# Patient Record
Sex: FEMALE | Race: BLACK OR AFRICAN AMERICAN | NOT HISPANIC OR LATINO | Employment: FULL TIME | URBAN - METROPOLITAN AREA
[De-identification: names, ages, dates, MRNs, and addresses within clinical notes are randomized per-mention and may not be internally consistent; named-entity substitution may affect disease eponyms.]

---

## 2017-02-08 ENCOUNTER — OFFICE VISIT (OUTPATIENT)
Dept: FAMILY MEDICINE CLINIC | Facility: CLINIC | Age: 57
End: 2017-02-08

## 2017-02-08 VITALS
DIASTOLIC BLOOD PRESSURE: 70 MMHG | WEIGHT: 230 LBS | BODY MASS INDEX: 38.32 KG/M2 | TEMPERATURE: 97.5 F | SYSTOLIC BLOOD PRESSURE: 132 MMHG | HEIGHT: 65 IN
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
## 2017-02-08 DIAGNOSIS — E88.81 METABOLIC SYNDROME: ICD-10-CM

## 2017-02-08 DIAGNOSIS — R20.0 NUMBNESS OF RIGHT HAND: ICD-10-CM

## 2017-02-08 DIAGNOSIS — L65.9 PATCHY LOSS OF HAIR: ICD-10-CM

## 2017-02-08 DIAGNOSIS — Z13.1 SCREENING FOR DIABETES MELLITUS: ICD-10-CM

## 2017-02-08 DIAGNOSIS — Z79.899 ENCOUNTER FOR LONG-TERM (CURRENT) USE OF MEDICATIONS: Primary | ICD-10-CM

## 2017-02-08 DIAGNOSIS — S49.91XA RIGHT SHOULDER INJURY, INITIAL ENCOUNTER: ICD-10-CM

## 2017-02-08 PROCEDURE — 99214 OFFICE O/P EST MOD 30 MIN: CPT | Performed by: INTERNAL MEDICINE

## 2017-02-08 RX ORDER — MULTIVIT-MINS NO.7/FOLIC ACID 1 MG
CAPSULE ORAL
Refills: 5 | COMMUNITY
Start: 2017-01-24 | End: 2020-04-15

## 2017-02-08 NOTE — PATIENT INSTRUCTIONS
Diagnoses and all orders for this visit:    Fall (on) (from) other stairs and steps, initial encounter  Comments:  Issues seem limited to right arm and hand  Refer to Ortho  Tylenool for now    Numbness of right hand  Comments:  Refer to ortho    Right shoulder injury, initial encounter  Comments:  As above  Tylenol   Refer to Ortho    Patchy loss of hair  Comments:  Dr. Dinh to evaluate  Injecting scalp    Metabolic syndrome with insulin resistance  Comments:  Recheck labs today  Has been taking her meds more regularly    Other orders  -     Multiple Vitamins-Minerals (V-C FORTE) capsule; TK 1 C PO QD

## 2017-02-08 NOTE — PROGRESS NOTES
Lala Dejesus is a 56 y.o. female   Chief Complaint   Patient presents with   • Arm Pain     pain radiates from right shoulder to right hand, fell and caught her self on 1/12/17           Subjective   History of Present Illness     Lala Dejesus is a 56 y.o.female who presents with:pain in left arm and shoulder getting worse since  a fall on 1/13/17.  Tingling in palm of hand and right index finger and middle finger.  Ulnar nerve distribution of the nerve of the numbness.  NSAIA upset her stomach and she avoids.  Tried Tylenol and it helped.  Both numbness and pain on movement and particularly bad on certain days.  Certain movements like tweezing her chin cause her to have even more severe pain at times.  Sometimes just typing and sitting around she gets numbness that is particularly bad in the index finger and middle finger.  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    Other issues discussed today:  She does have a patchy loss of hair.She has seen Dr. Alvin Dinh for this again and he is doing injections into her scalp and using a medicine called VC [place] up with the situation.  In terms of her metabolic syndrome we talked about exercise diet weight loss and continuing her on Herzig do 05 500 that she takes once a day.  The key is obviously the weight loss and hopefully visit do oh will help with that.  Plan to check labs today to see how much we've accomplished by taking the Zetia do oh.  She has been taken out on a more regular basis recently.  It does sound like she has some stress at home but she is managing that quite effectively and working through things with her boyfriend.      The following portions of the patient's history were reviewed and updated as appropriate: past medical history, surgeries, family history, allergies, current medications, past social history and problem list.    A comprehensive review of 14 systems was peformed  Review of Systems  "  Constitutional: Negative for chills, fatigue, fever and unexpected weight change.   HENT: Negative for ear pain, hearing loss, sinus pressure, sore throat and tinnitus.    Eyes: Negative for pain, discharge and redness.   Respiratory: Negative for cough, shortness of breath and wheezing.    Cardiovascular: Negative for chest pain, palpitations and leg swelling.   Gastrointestinal: Negative for abdominal pain, constipation, diarrhea and nausea.   Endocrine: Negative for cold intolerance and heat intolerance.   Genitourinary: Negative for difficulty urinating, flank pain and urgency.   Musculoskeletal: Negative for back pain, joint swelling and myalgias.   Skin: Negative for rash and wound.   Allergic/Immunologic: Negative for environmental allergies and food allergies.   Neurological: Negative for dizziness, seizures, numbness and headaches.   Hematological: Negative for adenopathy. Does not bruise/bleed easily.   Psychiatric/Behavioral: Negative for decreased concentration, dysphoric mood and sleep disturbance. The patient is not nervous/anxious.    All other systems reviewed and are negative.      I have reviewed the patient's medical history in detail and updated the computerized patient record.      Objective   Vitals:    02/08/17 1117   BP: 132/70   BP Location: Left arm   Patient Position: Sitting   Temp: 97.5 °F (36.4 °C)   TempSrc: Oral   Weight: 230 lb (104 kg)   Height: 64.5\" (163.8 cm)           Physical Exam   Constitutional: She appears well-developed and well-nourished.   HENT:   Head: Normocephalic and atraumatic.   Right Ear: External ear normal.   Left Ear: External ear normal.   Nose: Nose normal.   Mouth/Throat: Oropharynx is clear and moist.   Eyes: Conjunctivae and EOM are normal. Pupils are equal, round, and reactive to light.   Neck: Normal range of motion. Neck supple.   Cardiovascular: Normal rate, regular rhythm, normal heart sounds and intact distal pulses.    Pulmonary/Chest: Effort " normal and breath sounds normal.   Abdominal: Soft. Bowel sounds are normal.   Musculoskeletal: Normal range of motion.   Neurological: She is alert. She has normal reflexes.   Skin: Skin is warm and dry.   Psychiatric: She has a normal mood and affect. Her behavior is normal. Judgment and thought content normal.   Nursing note and vitals reviewed.      Procedures    Reviewed consult notes from Dr. Dinh    Reviewed old notes from [facility]                          Assessment/Plan     Diagnoses and all orders for this visit:    Fall (on) (from) other stairs and steps, initial encounter  Comments:  Issues seem limited to right arm and hand  Refer to Ortho  Tylenool for now    Numbness of right hand  Comments:  Refer to ortho    Right shoulder injury, initial encounter  Comments:  As above  Tylenol   Refer to Ortho    Patchy loss of hair  Comments:  Dr. Dinh to evaluate  Injecting scalp    Metabolic syndrome with insulin resistance  Comments:  Recheck labs today  Has been taking her meds more regularly    Other orders  -     Multiple Vitamins-Minerals (V-C FORTE) capsule; TK 1 C PO QD         Martin Vilchis MD  2/8/2017  11:22 AM

## 2017-02-09 LAB
ALBUMIN SERPL-MCNC: 4.1 G/DL (ref 3.5–5.2)
ALBUMIN/GLOB SERPL: 1.1 G/DL
ALP SERPL-CCNC: 111 U/L (ref 39–117)
ALT SERPL-CCNC: 16 U/L (ref 1–33)
AST SERPL-CCNC: 13 U/L (ref 1–32)
BILIRUB SERPL-MCNC: 0.2 MG/DL (ref 0.1–1.2)
BUN SERPL-MCNC: 9 MG/DL (ref 6–20)
BUN/CREAT SERPL: 11.1 (ref 7–25)
CALCIUM SERPL-MCNC: 10 MG/DL (ref 8.6–10.5)
CHLORIDE SERPL-SCNC: 102 MMOL/L (ref 98–107)
CO2 SERPL-SCNC: 28.9 MMOL/L (ref 22–29)
CREAT SERPL-MCNC: 0.81 MG/DL (ref 0.57–1)
GLOBULIN SER CALC-MCNC: 3.9 GM/DL
GLUCOSE SERPL-MCNC: 89 MG/DL (ref 65–99)
HBA1C MFR BLD: 6.56 % (ref 4.8–5.6)
POTASSIUM SERPL-SCNC: 4.5 MMOL/L (ref 3.5–5.2)
PROT SERPL-MCNC: 8 G/DL (ref 6–8.5)
SODIUM SERPL-SCNC: 143 MMOL/L (ref 136–145)

## 2017-02-10 DIAGNOSIS — M25.511 ACUTE PAIN OF RIGHT SHOULDER: Primary | ICD-10-CM

## 2017-03-06 ENCOUNTER — OFFICE VISIT (OUTPATIENT)
Dept: ORTHOPEDIC SURGERY | Facility: CLINIC | Age: 57
End: 2017-03-06

## 2017-03-06 VITALS — WEIGHT: 230 LBS | BODY MASS INDEX: 39.27 KG/M2 | TEMPERATURE: 97.8 F | HEIGHT: 64 IN

## 2017-03-06 DIAGNOSIS — M25.511 CHRONIC RIGHT SHOULDER PAIN: Primary | ICD-10-CM

## 2017-03-06 DIAGNOSIS — M25.521 RIGHT ELBOW PAIN: ICD-10-CM

## 2017-03-06 DIAGNOSIS — G56.01 RIGHT CARPAL TUNNEL SYNDROME: ICD-10-CM

## 2017-03-06 DIAGNOSIS — G89.29 CHRONIC RIGHT SHOULDER PAIN: Primary | ICD-10-CM

## 2017-03-06 PROCEDURE — 20610 DRAIN/INJ JOINT/BURSA W/O US: CPT | Performed by: ORTHOPAEDIC SURGERY

## 2017-03-06 PROCEDURE — 73010 X-RAY EXAM OF SHOULDER BLADE: CPT | Performed by: ORTHOPAEDIC SURGERY

## 2017-03-06 PROCEDURE — 73030 X-RAY EXAM OF SHOULDER: CPT | Performed by: ORTHOPAEDIC SURGERY

## 2017-03-06 PROCEDURE — 99204 OFFICE O/P NEW MOD 45 MIN: CPT | Performed by: ORTHOPAEDIC SURGERY

## 2017-03-06 PROCEDURE — 73070 X-RAY EXAM OF ELBOW: CPT | Performed by: ORTHOPAEDIC SURGERY

## 2017-03-06 PROCEDURE — 20526 THER INJECTION CARP TUNNEL: CPT | Performed by: ORTHOPAEDIC SURGERY

## 2017-03-06 RX ADMIN — LIDOCAINE HYDROCHLORIDE 2 ML: 10 INJECTION, SOLUTION INFILTRATION; PERINEURAL at 07:30

## 2017-03-06 RX ADMIN — BUPIVACAINE HYDROCHLORIDE 2 ML: 5 INJECTION, SOLUTION PERINEURAL at 07:30

## 2017-03-06 RX ADMIN — METHYLPREDNISOLONE ACETATE 160 MG: 80 INJECTION, SUSPENSION INTRA-ARTICULAR; INTRALESIONAL; INTRAMUSCULAR; SOFT TISSUE at 07:30

## 2017-03-07 RX ORDER — BUPIVACAINE HYDROCHLORIDE 5 MG/ML
1 INJECTION, SOLUTION PERINEURAL
Status: COMPLETED | OUTPATIENT
Start: 2017-03-07 | End: 2017-03-07

## 2017-03-07 RX ORDER — LIDOCAINE HYDROCHLORIDE 10 MG/ML
2 INJECTION, SOLUTION INFILTRATION; PERINEURAL
Status: COMPLETED | OUTPATIENT
Start: 2017-03-06 | End: 2017-03-06

## 2017-03-07 RX ORDER — LIDOCAINE HYDROCHLORIDE 10 MG/ML
1 INJECTION, SOLUTION INFILTRATION; PERINEURAL
Status: COMPLETED | OUTPATIENT
Start: 2017-03-07 | End: 2017-03-07

## 2017-03-07 RX ORDER — BUPIVACAINE HYDROCHLORIDE 5 MG/ML
2 INJECTION, SOLUTION PERINEURAL
Status: COMPLETED | OUTPATIENT
Start: 2017-03-06 | End: 2017-03-06

## 2017-03-07 RX ORDER — METHYLPREDNISOLONE ACETATE 80 MG/ML
80 INJECTION, SUSPENSION INTRA-ARTICULAR; INTRALESIONAL; INTRAMUSCULAR; SOFT TISSUE
Status: COMPLETED | OUTPATIENT
Start: 2017-03-07 | End: 2017-03-07

## 2017-03-07 RX ORDER — METHYLPREDNISOLONE ACETATE 80 MG/ML
160 INJECTION, SUSPENSION INTRA-ARTICULAR; INTRALESIONAL; INTRAMUSCULAR; SOFT TISSUE
Status: COMPLETED | OUTPATIENT
Start: 2017-03-06 | End: 2017-03-06

## 2017-03-07 RX ADMIN — BUPIVACAINE HYDROCHLORIDE 1 ML: 5 INJECTION, SOLUTION PERINEURAL at 07:31

## 2017-03-07 RX ADMIN — LIDOCAINE HYDROCHLORIDE 1 ML: 10 INJECTION, SOLUTION INFILTRATION; PERINEURAL at 07:31

## 2017-03-07 RX ADMIN — METHYLPREDNISOLONE ACETATE 80 MG: 80 INJECTION, SUSPENSION INTRA-ARTICULAR; INTRALESIONAL; INTRAMUSCULAR; SOFT TISSUE at 07:31

## 2017-03-07 NOTE — PROGRESS NOTES
"  Patient: Lala Dejesus    YOB: 1960    Medical Record Number: [id number]    Chief Complaints:  Right shoulder pain, right elbow pain, right hand numbness and tingling    History of Present Illness:     56 y.o. female patient who presents for evaluation of a number of issues.  The first is her shoulder.  She reports an approximately 6 week history of pain along the side of the shoulder.  She first noticed this sometime around the end of January.  She cannot recall any specific inciting event or factor, although she recalls that she slipped and fell in mid January, catching herself with her arm.  She doesn't recall specifically injuring the shoulder though..  The pain seems to have gradually progressed.  She describes moderate intermittent pain which is worse with certain reaching and lifting movements.   She has not noticed any alleviating factors.    She also mentions that she has been experiencing intermittent pain radiating down into her hand as well.  She tells me that it seems to start somewhere in her elbow or forearm and then shoots down into her thumb and index finger.  She also experiences intermittent numbness and tingling.  She specifically mentions that when she is \"tweezing my chin hairs\" her hand tends to go numb.  She also gets some intermittent symptoms at night.  She describes aching burning pain in the hand on occasion.  Denies any constant numbness or weakness She has not noticed any alleviating factors.    Allergies:   Allergies   Allergen Reactions   • Ketorolac Anaphylaxis   • Morphine And Related Anaphylaxis   • Aspirin    • Metformin And Related Other (See Comments)     Reaction: severe stomach cramp       Home Medications:      Current Outpatient Prescriptions:   •  Biotin (BIOTIN MAXIMUM STRENGTH) 10 MG tablet, Take  by mouth Daily., Disp: , Rfl:   •  Calcium Carb-Cholecalciferol (CALCIUM 600 + D) 600-200 MG-UNIT tablet, Take  by mouth., Disp: , Rfl:   •  " Dapagliflozin-Metformin HCl ER (XIGDUO XR) 5-500 MG tablet sustained-release 24 hour, Take 1 tablet by mouth Daily With Breakfast., Disp: 30 tablet, Rfl: 11  •  Eflornithine HCl 13.9 % cream, Use small amount once daily on chin for hair growth, Disp: 30 g, Rfl: 5  •  loratadine-pseudoephedrine (CLARITIN-D 12 HOUR) 5-120 MG per 12 hr tablet, Take  by mouth., Disp: , Rfl:   •  Multiple Vitamin (MULTI VITAMIN DAILY PO), Take  by mouth., Disp: , Rfl:   •  Multiple Vitamins-Minerals (V-C FORTE) capsule, TK 1 C PO QD, Disp: , Rfl: 5  •  Omega 3 1000 MG capsule, Take  by mouth., Disp: , Rfl:     Current Facility-Administered Medications:   •  cyanocobalamin injection 1,000 mcg, 1,000 mcg, Intramuscular, Q28 Days, Martin Vilchis MD, 1,000 mcg at 05/09/16 1647    Past Medical History   Diagnosis Date   • Abdominal pain      Abdominal pain and intolerance to regular metformin   • Abnormal Pap smear of cervix      Abnormal PAP S/P LEEP   • Allergic reaction      to morphine   • Allergic rhinitis    • Amenorrhea      due to menopause   • Breast lump      benign in past   • DM (diabetes mellitus)    • FH: early death      Early sudden death in family in family at age 34   • Gastritis    • Gastroparesis      with recurrent nausea   • GERD (gastroesophageal reflux disease)    • H/O mammogram      03/31/2016 wnl  12/27/13 12/04/12   • Hair loss      due to nervous twisting   • Hiatal hernia      on scope 2004   • History of bone density study 10/01/2012   • History of colonoscopy 03/2015     WNL   • History of EKG 12/06/2012   • Hyperlipidemia    • Insomnia    • Insulin resistance      with elevated glucoses trial of Janumet XR 50/1000   • Low back pain    • Metabolic syndrome      with high insulin level   • Papanicolaou smear 01/2016     wnl   • Right knee DJD    • Stricture of esophagus      Strictures of esophagus   • Trauma      to left hip, left hand and left elbow at hotel 11/18/15- no sequaela   • Weight loss       "Intentional weight loss at [facility]       Past Surgical History   Procedure Laterality Date   • Lasik Right 09/01/2012   • Eye surgery     • Leep N/A 1994     SURG   LEEP Procedure   • Nose surgery     • Colonoscopy  04/25/2014       Social History     Occupational History   • [employer]      Social History Main Topics   • Smoking status: Never Smoker   • Smokeless tobacco: Not on file   • Alcohol use Yes      Comment: rare   • Drug use: No   • Sexual activity: Yes     Partners: Male      Social History     Social History Narrative    Diet low carb.  [employer] home meal plan.        Hobby= shopping, walking, travel        Exercise= Water aeorbic, Gayatri, walking               Family History   Problem Relation Age of Onset   • Hypertension Mother    • Hypertension Sister    • Obesity Sister    • Heart disease Father    • Stroke Father    • Heart disease Other    • Sudden death Other        Review of Systems:      Constitutional: Denies fever, shaking or chills   Eyes: Denies change in visual acuity   HEENT: Denies nasal congestion or sore throat   Respiratory: Denies cough or shortness of breath   Cardiovascular: Denies chest pain or edema  Endocrine: Denies tremors, palpitations, intolerance of heat or cold, polyuria, polydipsia.  GI: Denies abdominal pain, nausea, vomiting, bloody stools or diarrhea  : Denies frequency, urgency, incontinence, retention, or nocturia.  Musculoskeletal: Denies numbness tingling or loss of motor function except as above  Integument: Denies rash, lesion or ulceration   Neurologic: Denies headache or focal weakness, deficits  Heme: Denies epistaxis, spontaneous or excessive bleeding, epistaxis, hematuria, melena, fatigue, enlarged or tender lymph nodes.      All other pertinent positives and negatives as noted above in HPI.      Physical Exam: 56 y.o. female    Vitals:    03/06/17 0908   Temp: 97.8 °F (36.6 °C)   Weight: 230 lb (104 kg)   Height: 64\" (162.6 cm) "       General:  Patient is awake and alert.  Appears in no acute distress or discomfort.    Psych:  Affect and demeanor are appropriate.    Eyes:  Conjunctiva and sclera appear grossly normal.  Eyes track well and EOM seem to be intact.    Ears:  No gross abnormalities.  Hearing adequate for the exam.    Cardiovascular:  Regular rate and rhythm.    Lungs:  Good chest expansion.  Breathing unlabored.    Lymph:  No palpable masses or adenopathy in the affected extremity    Extremities:  The right upper extremity is examined.  Skin is benign.  No gross abnormalities on inspection including any atrophy, swellings, or masses.  No palpable masses or adenopathy. No focal tenderness.  Full shoulder motion.  No evident instability or apprehension.  Positive Neer and Woodard manuevers.  Negative active compression maneuver.  Negative Speeds and Yergasons manuevers.  Good strength in the rotator cuff, deltoid, biceps, triceps, and .  Intact sensation throughout the arm and hand.  Positive Tinel's and Phalen's over the carpal tunnel.  Brisk cap refill.         Radiology:   AP, scapular Y, and axillary views of the right shoulder are ordered by myself and reviewed to evaluate the patient's complaint.  No comparison films are immediately available.  The x-rays show no obvious acute abnormalities, lesions, masses, or significant glenohumeral degenerative changes.  She does have moderate to severe acromioclavicular osteoarthritis.  The acromiohumeral interval is normal.  Glenoid version appears normal as well.  AP and lateral views of the right elbow are also ordered and reviewed.  No comparison films are available.  No concerning findings noted on the elbow x-rays      Assessment/Plan:  1.  Right shoulder impingement syndrome  2.  Right carpal tunnel syndrome    We talked about options for her.  I suggested that we try injections for both the shoulder and carpal tunnel.  The risk, benefits, and alternatives were thoroughly  discussed, particularly in light of her diabetes.  She did consent to proceed as described below.  I also recommended that we have her start using a splint for her wrist at night.  I had her fitted for that today.  I offered her a referral to formal physical therapy.  She declined that.  Going forward, I will release her to follow-up as needed.  If her symptoms persist and/or recur, I told her that I will be happy to see her back at any point.    Large Joint Arthrocentesis  Date/Time: 3/6/2017 7:30 AM  Consent given by: patient  Site marked: site marked  Timeout: Immediately prior to procedure a time out was called to verify the correct patient, procedure, equipment, support staff and site/side marked as required   Supporting Documentation  Indications: pain and joint swelling   Procedure Details  Location: shoulder - R subacromial bursa  Preparation: Patient was prepped and draped in the usual sterile fashion  Needle size: 25 G  Approach: posterior  Medications administered: 2 mL lidocaine 1 %; 160 mg methylPREDNISolone acetate 80 MG/ML; 2 mL bupivacaine  Patient tolerance: patient tolerated the procedure well with no immediate complications    Small Joint Arthrocentesis  Consent given by: patient  Site marked: site marked  Timeout: Immediately prior to procedure a time out was called to verify the correct patient, procedure, equipment, support staff and site/side marked as required   Supporting Documentation  Indications: pain and diagnostic evaluation   Procedure Details  Location: right carpal tunnel.  Preparation: Patient was prepped and draped in the usual sterile fashion  Needle size: 25 G  Approach: volar  Medications administered: 1 mL lidocaine 1 %; 80 mg methylPREDNISolone acetate 80 MG/ML; 1 mL bupivacaine  Patient tolerance: patient tolerated the procedure well with no immediate complications          Basilio Santiago MD    03/06/2017    CC to Martin Vilchis MD

## 2017-03-09 ENCOUNTER — OFFICE VISIT (OUTPATIENT)
Dept: FAMILY MEDICINE CLINIC | Facility: CLINIC | Age: 57
End: 2017-03-09

## 2017-03-09 VITALS
OXYGEN SATURATION: 97 % | WEIGHT: 232.6 LBS | BODY MASS INDEX: 39.71 KG/M2 | TEMPERATURE: 98.1 F | HEIGHT: 64 IN | DIASTOLIC BLOOD PRESSURE: 76 MMHG | SYSTOLIC BLOOD PRESSURE: 128 MMHG | HEART RATE: 83 BPM

## 2017-03-09 DIAGNOSIS — K31.84 GASTROPARESIS: ICD-10-CM

## 2017-03-09 DIAGNOSIS — N95.1 MENOPAUSAL SYNDROME: ICD-10-CM

## 2017-03-09 DIAGNOSIS — E53.8 VITAMIN B12 DEFICIENCY: ICD-10-CM

## 2017-03-09 DIAGNOSIS — E88.81 METABOLIC SYNDROME: Primary | ICD-10-CM

## 2017-03-09 DIAGNOSIS — R31.9 HEMATURIA: ICD-10-CM

## 2017-03-09 LAB
BILIRUB BLD-MCNC: NEGATIVE MG/DL
CLARITY, POC: CLEAR
COLOR UR: YELLOW
GLUCOSE UR STRIP-MCNC: NEGATIVE MG/DL
KETONES UR QL: NEGATIVE
LEUKOCYTE EST, POC: NEGATIVE
NITRITE UR-MCNC: NEGATIVE MG/ML
PH UR: 6 [PH] (ref 5–8)
PROT UR STRIP-MCNC: ABNORMAL MG/DL
RBC # UR STRIP: ABNORMAL /UL
SP GR UR: 1.02 (ref 1–1.03)
UROBILINOGEN UR QL: NORMAL

## 2017-03-09 PROCEDURE — 81003 URINALYSIS AUTO W/O SCOPE: CPT | Performed by: INTERNAL MEDICINE

## 2017-03-09 PROCEDURE — 99213 OFFICE O/P EST LOW 20 MIN: CPT | Performed by: INTERNAL MEDICINE

## 2017-03-09 NOTE — PATIENT INSTRUCTIONS
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

## 2017-03-09 NOTE — PROGRESS NOTES
Lala Dejesus is a 56 y.o. female   Chief Complaint   Patient presents with   • organization         Subjective   History of Present Illness     Lala Dejesus is a 56 y.o.female who presents with:routine follow up.  No other acute changes at present.Urine today shows a trace of hematuria.  Blood sugar doing okay.  Refilling meds at some point.  Doing well at present time.      The following portions of the patient's history were reviewed and updated as appropriate: past medical history, surgeries, family history, allergies, current medications, past social history and problem list.    A comprehensive review of 14 systems was peformed  Review of Systems   Constitutional: Negative for chills, fatigue, fever and unexpected weight change.   HENT: Negative for ear pain, hearing loss, sinus pressure, sore throat and tinnitus.    Eyes: Negative for pain, discharge and redness.   Respiratory: Negative for cough, shortness of breath and wheezing.    Cardiovascular: Negative for chest pain, palpitations and leg swelling.   Gastrointestinal: Negative for abdominal pain, constipation, diarrhea and nausea.   Endocrine: Negative for cold intolerance and heat intolerance.   Genitourinary: Negative for difficulty urinating, flank pain and urgency.   Musculoskeletal: Negative for back pain, joint swelling and myalgias.   Skin: Negative for rash and wound.   Allergic/Immunologic: Negative for environmental allergies and food allergies.   Neurological: Negative for dizziness, seizures, numbness and headaches.   Hematological: Negative for adenopathy. Does not bruise/bleed easily.   Psychiatric/Behavioral: Negative for decreased concentration, dysphoric mood and sleep disturbance. The patient is not nervous/anxious.    All other systems reviewed and are negative.      I have reviewed the patient's medical history in detail and updated the computerized patient record.    Objective   Vitals:    03/09/17 1600   BP: 128/76   BP Location:  "Right arm   Patient Position: Sitting   Cuff Size: Adult   Pulse: 83   Temp: 98.1 °F (36.7 °C)   TempSrc: Oral   SpO2: 97%   Weight: 232 lb 9.6 oz (106 kg)   Height: 64\" (162.6 cm)   PainSc: 0-No pain           Physical Exam   Constitutional: She appears well-developed and well-nourished.   HENT:   Head: Normocephalic and atraumatic.   Right Ear: External ear normal.   Left Ear: External ear normal.   Nose: Nose normal.   Mouth/Throat: Oropharynx is clear and moist.   Eyes: Conjunctivae and EOM are normal. Pupils are equal, round, and reactive to light.   Neck: Normal range of motion. Neck supple.   Cardiovascular: Normal rate, regular rhythm, normal heart sounds and intact distal pulses.    Pulmonary/Chest: Effort normal and breath sounds normal.   Abdominal: Soft. Bowel sounds are normal.   Musculoskeletal: Normal range of motion.   Neurological: She is alert. She has normal reflexes.   Skin: Skin is warm and dry.   Psychiatric: She has a normal mood and affect. Her behavior is normal. Judgment and thought content normal.       Procedures                        Assessment/Plan     Diagnoses and all orders for this visit:    Metabolic syndrome with insulin resistance  Comments:  Ongiing treatment at present time  Doing well    Vitamin B12 deficiency  Comments:  Continue to monitor  B-12 shot    Gastroparesis with metabolic syndrome  Comments:  Going well at present  No other acute changes    Hematuria  Comments:  Recheck next visit    Menopausal syndrome  Comments:  stable         Martin Vilchis MD  3/9/2017  4:02 PM      "

## 2017-05-30 DIAGNOSIS — E88.81 METABOLIC SYNDROME: ICD-10-CM

## 2017-06-08 ENCOUNTER — OFFICE VISIT (OUTPATIENT)
Dept: FAMILY MEDICINE CLINIC | Facility: CLINIC | Age: 57
End: 2017-06-08

## 2017-06-08 VITALS
DIASTOLIC BLOOD PRESSURE: 78 MMHG | OXYGEN SATURATION: 99 % | SYSTOLIC BLOOD PRESSURE: 122 MMHG | BODY MASS INDEX: 39.13 KG/M2 | TEMPERATURE: 98.7 F | HEIGHT: 64 IN | HEART RATE: 86 BPM | WEIGHT: 229.2 LBS

## 2017-06-08 DIAGNOSIS — K22.2 STRICTURE OF ESOPHAGUS: ICD-10-CM

## 2017-06-08 DIAGNOSIS — Z79.899 ENCOUNTER FOR LONG-TERM (CURRENT) USE OF MEDICATIONS: ICD-10-CM

## 2017-06-08 DIAGNOSIS — J30.1 NON-SEASONAL ALLERGIC RHINITIS DUE TO POLLEN: ICD-10-CM

## 2017-06-08 DIAGNOSIS — E78.5 HYPERLIPIDEMIA, UNSPECIFIED HYPERLIPIDEMIA TYPE: ICD-10-CM

## 2017-06-08 DIAGNOSIS — K31.84 GASTROPARESIS: Primary | ICD-10-CM

## 2017-06-08 DIAGNOSIS — F51.03 PARADOXICAL INSOMNIA: ICD-10-CM

## 2017-06-08 DIAGNOSIS — R31.9 HEMATURIA: ICD-10-CM

## 2017-06-08 PROCEDURE — 99214 OFFICE O/P EST MOD 30 MIN: CPT | Performed by: INTERNAL MEDICINE

## 2017-06-08 NOTE — PATIENT INSTRUCTIONS
Diagnoses and all orders for this visit:    Gastroparesis with metabolic syndrome  Comments:  Continue to monitor  No other acute changes at present time  Orders:  -     CBC & Differential  -     Comprehensive Metabolic Panel  -     Lipid Panel With LDL / HDL Ratio  -     Thyroid Panel With TSH  -     Hemoglobin A1c    Paradoxical insomnia  Comments:  Continue to monitor and treat as needeed  Follow up as planned  Orders:  -     CBC & Differential  -     Comprehensive Metabolic Panel  -     Lipid Panel With LDL / HDL Ratio  -     Thyroid Panel With TSH  -     Hemoglobin A1c    Hyperlipidemia, unspecified hyperlipidemia type  Comments:  Check labs  Consider statin at present time  Orders:  -     CBC & Differential  -     Comprehensive Metabolic Panel  -     Lipid Panel With LDL / HDL Ratio  -     Thyroid Panel With TSH  -     Hemoglobin A1c    Stricture of esophagus  Comments:  Follow up as planned if symptoms reoccur    Non-seasonal allergic rhinitis due to pollen  Comments:  Follow up as needed    Encounter for long-term (current) use of medications  -     CBC & Differential  -     Comprehensive Metabolic Panel  -     Lipid Panel With LDL / HDL Ratio  -     Thyroid Panel With TSH  -     Hemoglobin A1c    Hematuria

## 2017-06-08 NOTE — PROGRESS NOTES
Lala Dejesus is a 56 y.o. female   Chief Complaint   Patient presents with   • metabolic syndrome     4 mo f/u       Diabetes   She presents for her follow-up diabetic visit. She has type 2 diabetes mellitus. No MedicAlert identification noted. The initial diagnosis of diabetes was made 1 year ago. Her disease course has been fluctuating. Pertinent negatives for hypoglycemia include no confusion, dizziness, headaches, hunger, mood changes, nervousness/anxiousness, pallor, seizures, sleepiness, speech difficulty, sweats or tremors. Pertinent negatives for diabetes include no blurred vision, no chest pain, no fatigue, no foot paresthesias, no foot ulcerations, no polydipsia, no polyphagia, no polyuria, no visual change, no weakness and no weight loss. Pertinent negatives for hypoglycemia complications include no blackouts, no hospitalization, no nocturnal hypoglycemia, no required assistance and no required glucagon injection. Symptoms are stable. Pertinent negatives for diabetic complications include no autonomic neuropathy, CVA, heart disease, impotence, nephropathy, peripheral neuropathy, PVD or retinopathy. Risk factors for coronary artery disease include family history and sedentary lifestyle. Current diabetic treatment includes diet and oral agent (dual therapy). She is compliant with treatment most of the time. Her weight is stable. She is following a diabetic diet. When asked about meal planning, she reported none. She has not had a previous visit with a dietitian. She participates in exercise three times a week. There is no change in her home blood glucose trend. An ACE inhibitor/angiotensin II receptor blocker is not being taken. She does not see a podiatrist.Eye exam is current.        Lala Dejesus is a 56 y.o.female who presents with metabolic syndrome.  Now follow up as planned.  Follow up as planned.  No other acute changes at present time.  Generally doing quite well. Feels good at present time.   "    No other acute changes at present time.  Doing well at present.  Weight seems to really effect her A-1-C at this point.  Thinks that her weight really controls the A-1-C.  Otherwise doing well.  No other changes at present time.      The following portions of the patient's history were reviewed and updated as appropriate: past medical history, surgeries, family history, allergies, current medications, past social history and problem list.    A comprehensive review of 14 systems was peformed  Review of Systems   Constitutional: Negative.  Negative for fatigue and weight loss.   HENT: Negative.    Eyes: Negative.  Negative for blurred vision.   Respiratory: Negative.    Cardiovascular: Positive for leg swelling (bilat swelling in feet). Negative for chest pain.   Gastrointestinal: Negative.    Endocrine: Negative.  Negative for polydipsia, polyphagia and polyuria.   Genitourinary: Negative.  Negative for impotence.   Musculoskeletal: Positive for arthralgias.   Skin: Negative.  Negative for pallor.   Allergic/Immunologic: Negative.    Neurological: Negative.  Negative for dizziness, tremors, seizures, speech difficulty, weakness and headaches.   Hematological: Negative.    Psychiatric/Behavioral: Negative.  Negative for confusion. The patient is not nervous/anxious.    All other systems reviewed and are negative.      I have reviewed the patient's medical history in detail and updated the computerized patient record.      Objective   Vitals:    06/08/17 1523   BP: 122/78   BP Location: Left arm   Patient Position: Sitting   Cuff Size: Adult   Pulse: 86   Temp: 98.7 °F (37.1 °C)   TempSrc: Oral   SpO2: 99%   Weight: 229 lb 3.2 oz (104 kg)   Height: 64\" (162.6 cm)   PainSc: 4  Comment: right arm   PainLoc: Arm           Physical Exam   Constitutional: She appears well-developed and well-nourished.   HENT:   Head: Normocephalic and atraumatic.   Right Ear: External ear normal.   Left Ear: External ear normal.   Nose: " Nose normal.   Mouth/Throat: Oropharynx is clear and moist.   Eyes: Conjunctivae and EOM are normal. Pupils are equal, round, and reactive to light.   Neck: Normal range of motion. Neck supple.   Cardiovascular: Normal rate, regular rhythm, normal heart sounds and intact distal pulses.    Pulmonary/Chest: Effort normal and breath sounds normal.   Abdominal: Soft. Bowel sounds are normal.   Musculoskeletal: Normal range of motion.   Ankle swelling at present time   Neurological: She is alert. She has normal reflexes.   Skin: Skin is warm and dry.   No foot lesions   Psychiatric: She has a normal mood and affect. Her behavior is normal. Judgment and thought content normal.       Procedures                          Assessment/Plan     Diagnoses and all orders for this visit:    Gastroparesis with metabolic syndrome  Comments:  Continue to monitor  No other acute changes at present time  Orders:  -     CBC & Differential  -     Comprehensive Metabolic Panel  -     Lipid Panel With LDL / HDL Ratio  -     Thyroid Panel With TSH  -     Hemoglobin A1c    Paradoxical insomnia  Comments:  Continue to monitor and treat as needeed  Follow up as planned  Orders:  -     CBC & Differential  -     Comprehensive Metabolic Panel  -     Lipid Panel With LDL / HDL Ratio  -     Thyroid Panel With TSH  -     Hemoglobin A1c    Hyperlipidemia, unspecified hyperlipidemia type  Comments:  Check labs  Consider statin at present time  Orders:  -     CBC & Differential  -     Comprehensive Metabolic Panel  -     Lipid Panel With LDL / HDL Ratio  -     Thyroid Panel With TSH  -     Hemoglobin A1c    Stricture of esophagus  Comments:  Follow up as planned if symptoms reoccur    Non-seasonal allergic rhinitis due to pollen  Comments:  Follow up as needed    Encounter for long-term (current) use of medications  -     CBC & Differential  -     Comprehensive Metabolic Panel  -     Lipid Panel With LDL / HDL Ratio  -     Thyroid Panel With TSH  -      Hemoglobin A1c    Hematuria           Martin Vilchis MD  6/8/2017  3:33 PM

## 2017-06-09 LAB
ALBUMIN SERPL-MCNC: 4.7 G/DL (ref 3.5–5.2)
ALBUMIN/GLOB SERPL: 1.2 G/DL
ALP SERPL-CCNC: 135 U/L (ref 39–117)
ALT SERPL-CCNC: 21 U/L (ref 1–33)
AST SERPL-CCNC: 19 U/L (ref 1–32)
BASOPHILS # BLD AUTO: 0.01 10*3/MM3 (ref 0–0.2)
BASOPHILS NFR BLD AUTO: 0.1 % (ref 0–1.5)
BILIRUB SERPL-MCNC: 0.3 MG/DL (ref 0.1–1.2)
BUN SERPL-MCNC: 12 MG/DL (ref 6–20)
BUN/CREAT SERPL: 13.3 (ref 7–25)
CALCIUM SERPL-MCNC: 10 MG/DL (ref 8.6–10.5)
CHLORIDE SERPL-SCNC: 98 MMOL/L (ref 98–107)
CHOLEST SERPL-MCNC: 284 MG/DL (ref 0–200)
CO2 SERPL-SCNC: 26.7 MMOL/L (ref 22–29)
CREAT SERPL-MCNC: 0.9 MG/DL (ref 0.57–1)
EOSINOPHIL # BLD AUTO: 0.12 10*3/MM3 (ref 0–0.7)
EOSINOPHIL NFR BLD AUTO: 1.2 % (ref 0.3–6.2)
ERYTHROCYTE [DISTWIDTH] IN BLOOD BY AUTOMATED COUNT: 13.5 % (ref 11.7–13)
FT4I SERPL CALC-MCNC: 1.8 (ref 1.2–4.9)
GLOBULIN SER CALC-MCNC: 3.9 GM/DL
GLUCOSE SERPL-MCNC: 103 MG/DL (ref 65–99)
HBA1C MFR BLD: 6.59 % (ref 4.8–5.6)
HCT VFR BLD AUTO: 42.5 % (ref 35.6–45.5)
HDLC SERPL-MCNC: 62 MG/DL (ref 40–60)
HGB BLD-MCNC: 13.7 G/DL (ref 11.9–15.5)
IMM GRANULOCYTES # BLD: 0 10*3/MM3 (ref 0–0.03)
IMM GRANULOCYTES NFR BLD: 0 % (ref 0–0.5)
LDLC SERPL CALC-MCNC: 205 MG/DL (ref 0–100)
LDLC/HDLC SERPL: 3.31 {RATIO}
LYMPHOCYTES # BLD AUTO: 2.31 10*3/MM3 (ref 0.9–4.8)
LYMPHOCYTES NFR BLD AUTO: 23.8 % (ref 19.6–45.3)
MCH RBC QN AUTO: 29.2 PG (ref 26.9–32)
MCHC RBC AUTO-ENTMCNC: 32.2 G/DL (ref 32.4–36.3)
MCV RBC AUTO: 90.6 FL (ref 80.5–98.2)
MONOCYTES # BLD AUTO: 0.81 10*3/MM3 (ref 0.2–1.2)
MONOCYTES NFR BLD AUTO: 8.3 % (ref 5–12)
NEUTROPHILS # BLD AUTO: 6.46 10*3/MM3 (ref 1.9–8.1)
NEUTROPHILS NFR BLD AUTO: 66.6 % (ref 42.7–76)
PLATELET # BLD AUTO: 303 10*3/MM3 (ref 140–500)
POTASSIUM SERPL-SCNC: 4.2 MMOL/L (ref 3.5–5.2)
PROT SERPL-MCNC: 8.6 G/DL (ref 6–8.5)
RBC # BLD AUTO: 4.69 10*6/MM3 (ref 3.9–5.2)
SODIUM SERPL-SCNC: 142 MMOL/L (ref 136–145)
T3RU NFR SERPL: 24 % (ref 24–39)
T4 SERPL-MCNC: 7.3 UG/DL (ref 4.5–12)
TRIGL SERPL-MCNC: 83 MG/DL (ref 0–150)
TSH SERPL DL<=0.005 MIU/L-ACNC: 2.52 UIU/ML (ref 0.45–4.5)
VLDLC SERPL CALC-MCNC: 16.6 MG/DL (ref 5–40)
WBC # BLD AUTO: 9.71 10*3/MM3 (ref 4.5–10.7)

## 2018-09-19 ENCOUNTER — TRANSCRIBE ORDERS (OUTPATIENT)
Dept: DIABETES SERVICES | Facility: HOSPITAL | Age: 58
End: 2018-09-19

## 2018-09-19 DIAGNOSIS — E11.9 DIABETES MELLITUS WITHOUT COMPLICATION (HCC): Primary | ICD-10-CM

## 2018-10-02 ENCOUNTER — HOSPITAL ENCOUNTER (OUTPATIENT)
Dept: DIABETES SERVICES | Facility: HOSPITAL | Age: 58
Setting detail: RECURRING SERIES
Discharge: HOME | End: 2018-10-02
Attending: INTERNAL MEDICINE

## 2018-10-02 PROCEDURE — G0109 DIAB MANAGE TRN IND/GROUP: HCPCS

## 2018-10-09 ENCOUNTER — HOSPITAL ENCOUNTER (OUTPATIENT)
Dept: DIABETES SERVICES | Facility: HOSPITAL | Age: 58
Setting detail: RECURRING SERIES
Discharge: HOME | End: 2018-10-09
Attending: INTERNAL MEDICINE

## 2018-10-09 PROCEDURE — G0109 DIAB MANAGE TRN IND/GROUP: HCPCS

## 2018-10-16 ENCOUNTER — HOSPITAL ENCOUNTER (OUTPATIENT)
Dept: DIABETES SERVICES | Facility: HOSPITAL | Age: 58
Setting detail: RECURRING SERIES
Discharge: HOME | End: 2018-10-16
Attending: INTERNAL MEDICINE

## 2018-10-16 PROCEDURE — G0109 DIAB MANAGE TRN IND/GROUP: HCPCS

## 2019-03-22 ENCOUNTER — HOSPITAL ENCOUNTER (OUTPATIENT)
Dept: GENERAL RADIOLOGY | Facility: HOSPITAL | Age: 59
Discharge: HOME | End: 2019-03-22
Admitting: INTERNAL MEDICINE

## 2019-03-22 DIAGNOSIS — R07.9 CHEST PAIN, UNSPECIFIED TYPE: ICD-10-CM

## 2019-03-22 PROCEDURE — 71046 X-RAY EXAM CHEST 2 VIEWS: CPT

## 2019-09-25 ENCOUNTER — APPOINTMENT (OUTPATIENT)
Dept: GENERAL RADIOLOGY | Facility: HOSPITAL | Age: 59
End: 2019-09-25

## 2019-09-25 PROCEDURE — 99283 EMERGENCY DEPT VISIT LOW MDM: CPT

## 2019-09-25 PROCEDURE — 73140 X-RAY EXAM OF FINGER(S): CPT

## 2019-09-25 NOTE — ED NOTES
Pt ambulatory to triage with c/o right middle finger pain from slamming in car door prior to arrival.     Mulu Watson, RN  09/25/19 4619

## 2019-09-26 ENCOUNTER — HOSPITAL ENCOUNTER (EMERGENCY)
Facility: HOSPITAL | Age: 59
Discharge: HOME | End: 2019-09-26
Attending: EMERGENCY MEDICINE | Admitting: EMERGENCY MEDICINE

## 2019-09-26 VITALS
TEMPERATURE: 97.1 F | HEIGHT: 64 IN | OXYGEN SATURATION: 99 % | BODY MASS INDEX: 39.37 KG/M2 | SYSTOLIC BLOOD PRESSURE: 131 MMHG | WEIGHT: 230.6 LBS | RESPIRATION RATE: 16 BRPM | HEART RATE: 80 BPM | DIASTOLIC BLOOD PRESSURE: 80 MMHG

## 2019-09-26 DIAGNOSIS — S67.10XA CRUSHING INJURY OF FINGER OF RIGHT HAND: Primary | ICD-10-CM

## 2019-09-26 NOTE — ED PROVIDER NOTES
EMERGENCY DEPARTMENT ENCOUNTER    Room Number:  HALG/G  Date of encounter:  9/26/2019  PCP: Martin Vilchis MD  Historian: Recent      HPI:  Chief Complaint: Crushing injury to the right hand  A complete HPI/ROS/PMH/PSH/SH/FH are unobtainable due to: Nothing    Context: Lala Dejesus is a 59 y.o. female who presents to the ED c/o patient got her right hand caught in the car door accidentally this evening.  She complains of pain to the second third and fourth fingers, most severe in the middle finger      PAST MEDICAL HISTORY  Active Ambulatory Problems     Diagnosis Date Noted   • Patchy loss of hair 05/09/2016   • Metabolic syndrome with insulin resistance 05/09/2016   • Vitamin B12 deficiency 05/09/2016   • Gastroparesis with metabolic syndrome 05/09/2016   • Esophageal hiatal hernia 05/09/2016   • Stricture of esophagus 05/09/2016   • Paradoxical insomnia 05/09/2016   • Menopausal syndrome 05/09/2016   • Family history of early death (Dad at age 32) 05/09/2016   • Allergic rhinitis 05/09/2016   • Laceration of ankle with tendon involvement 05/09/2016   • Bronchitis 11/17/2016   • Cough 11/17/2016   • Acute bacterial conjunctivitis 11/17/2016   • Fall (on) (from) other stairs and steps, initial encounter 02/08/2017   • Right shoulder injury 02/08/2017   • Numbness of right hand 02/08/2017   • Hematuria 03/09/2017     Resolved Ambulatory Problems     Diagnosis Date Noted   • No Resolved Ambulatory Problems     Past Medical History:   Diagnosis Date   • Abdominal pain    • Abnormal Pap smear of cervix    • Allergic reaction    • Allergic rhinitis    • Amenorrhea    • Breast lump    • DM (diabetes mellitus) (CMS/HCC)    • FH: early death    • Gastritis    • Gastroparesis    • GERD (gastroesophageal reflux disease)    • H/O mammogram    • Hair loss    • Hiatal hernia    • History of bone density study 10/01/2012   • History of colonoscopy 03/2015   • History of EKG 12/06/2012   • Hyperlipidemia    • Insomnia    •  Insulin resistance    • Low back pain    • Metabolic syndrome    • Papanicolaou smear 01/2016   • Right knee DJD    • Stricture of esophagus    • Trauma    • Weight loss          PAST SURGICAL HISTORY  Past Surgical History:   Procedure Laterality Date   • COLONOSCOPY  04/25/2014   • EYE SURGERY     • LASIK Right 09/01/2012   • LEEP N/A 1994    SURG   LEEP Procedure   • NOSE SURGERY           FAMILY HISTORY  Family History   Problem Relation Age of Onset   • Hypertension Mother    • Hypertension Sister    • Obesity Sister    • Heart disease Father    • Stroke Father    • Heart disease Other    • Sudden death Other          SOCIAL HISTORY  Social History     Socioeconomic History   • Marital status: Single     Spouse name: Not on file   • Number of children: 2   • Years of education: 12+4.5   • Highest education level: Not on file   Occupational History   • Occupation: [employer]   Tobacco Use   • Smoking status: Never Smoker   Substance and Sexual Activity   • Alcohol use: Yes     Comment: rare   • Drug use: No   • Sexual activity: Yes     Partners: Male   Social History Narrative    Diet low carb.  [employer] home meal plan.        Hobby= shopping, walking, travel        Exercise= Water aeorbic, Gayatri, walking             ALLERGIES  Ketorolac; Morphine and related; Aspirin; and Metformin and related        REVIEW OF SYSTEMS  Review of Systems   Respiratory: Negative for chest tightness.    Gastrointestinal: Negative for abdominal pain.   Musculoskeletal: Positive for joint swelling.   Neurological: Negative for numbness.             PHYSICAL EXAM    I have reviewed the triage vital signs and nursing notes.    ED Triage Vitals   Temp Heart Rate Resp BP SpO2   09/25/19 1921 09/25/19 1921 09/25/19 1921 09/26/19 0000 09/25/19 1921   97.1 °F (36.2 °C) 80 16 131/80 99 %      Temp src Heart Rate Source Patient Position BP Location FiO2 (%)   09/25/19 1921 09/25/19 1921 -- 09/26/19 0000 --   Tympanic  Monitor  Right arm        Physical Exam  GENERAL: not distressed  HENT: nares patent  EYES: no scleral icterus  CV: regular rhythm, regular rate  RESPIRATORY: normal effort  ABDOMEN: soft  MUSCULOSKELETAL: no deformity, on the right hand there is soft tissue swelling and tenderness to the second third and fourth fingers.  The pain and swelling are localized mostly to the proximal phalanx and MCP of the middle finger.  There is no deformity, and there is some pain with range of motion at the PIP and MCP on the middle finger.  Neurovascularly intact with no evidence of tendon injury.  NEURO: alert, moves all extremities, follows commands  SKIN: warm, dry        LAB RESULTS  No results found for this or any previous visit (from the past 24 hour(s)).    Ordered the above labs and independently reviewed the results.        RADIOLOGY  Xr Finger 2+ View Right    Result Date: 9/25/2019  3 VIEWS OF THE RIGHT MIDDLE FINGER  HISTORY: Pain in the right middle finger  COMPARISON: None available.  FINDINGS: No acute fracture or subluxation of the right little finger is identified. No aggressive osseous abnormalities are seen.      No acute findings.  This report was finalized on 9/25/2019 11:29 PM by Dr. Georgina Hicks M.D.        I ordered the above noted radiological studies. Reviewed by me and discussed with radiologist.  See dictation for official radiology interpretation.      PROCEDURES    Procedures      MEDICATIONS GIVEN IN ER    Medications - No data to display      PROGRESS, DATA ANALYSIS, CONSULTS, AND MEDICAL DECISION MAKING    All labs have been independently reviewed by me.  All radiology studies have been reviewed by me and discussed with radiologist dictating the report.   EKG's independently viewed and interpreted by me.  Discussion below represents my analysis of pertinent findings related to patient's condition, differential diagnosis, treatment plan and final disposition.      ED Course as of Sep 26 0107    Thu Sep 26, 2019   0107 No fracture is seen on the x-ray  [DP]      ED Course User Index  [DP] Navarro Ireland MD       AS OF 1:07 AM VITALS:    BP - 131/80  HR - 80  TEMP - 97.1 °F (36.2 °C) (Tympanic)  02 SATS - 99%        DIAGNOSIS  Final diagnoses:   Crushing injury of finger of right hand         DISPOSITION  Discharge         Navarro Ireland MD  09/26/19 0107

## 2019-09-26 NOTE — ED NOTES
Pt's right 3rd and 4th finger linda taped together, 2x2 placed between fingers for comfort. Pt tolerated well.     Carline Ruvalcaba RN  09/26/19 0043

## 2019-10-18 ENCOUNTER — HOSPITAL ENCOUNTER (OUTPATIENT)
Dept: GENERAL RADIOLOGY | Facility: HOSPITAL | Age: 59
Discharge: HOME | End: 2019-10-18

## 2019-10-18 ENCOUNTER — HOSPITAL ENCOUNTER (OUTPATIENT)
Dept: GENERAL RADIOLOGY | Facility: HOSPITAL | Age: 59
Discharge: HOME | End: 2019-10-18
Admitting: INTERNAL MEDICINE

## 2019-10-18 DIAGNOSIS — M79.641 RIGHT HAND PAIN: ICD-10-CM

## 2019-10-18 DIAGNOSIS — R05.9 COUGH: ICD-10-CM

## 2019-10-18 PROCEDURE — 71046 X-RAY EXAM CHEST 2 VIEWS: CPT

## 2019-10-18 PROCEDURE — 73120 X-RAY EXAM OF HAND: CPT

## 2020-04-15 ENCOUNTER — TELEMEDICINE (OUTPATIENT)
Dept: CARDIOLOGY | Facility: CLINIC | Age: 60
End: 2020-04-15

## 2020-04-15 VITALS — HEIGHT: 64 IN | WEIGHT: 229 LBS | BODY MASS INDEX: 39.09 KG/M2

## 2020-04-15 DIAGNOSIS — E78.5 HYPERLIPIDEMIA, UNSPECIFIED HYPERLIPIDEMIA TYPE: ICD-10-CM

## 2020-04-15 DIAGNOSIS — R07.2 PRECORDIAL PAIN: ICD-10-CM

## 2020-04-15 DIAGNOSIS — R06.02 SOB (SHORTNESS OF BREATH): ICD-10-CM

## 2020-04-15 DIAGNOSIS — Z82.49 FH ISCHEMIC HEART DISEASE: Primary | ICD-10-CM

## 2020-04-15 DIAGNOSIS — R63.5 WEIGHT GAIN: ICD-10-CM

## 2020-04-15 PROCEDURE — 99203 OFFICE O/P NEW LOW 30 MIN: CPT | Performed by: INTERNAL MEDICINE

## 2020-04-15 RX ORDER — ALBUTEROL SULFATE 90 UG/1
AEROSOL, METERED RESPIRATORY (INHALATION)
COMMUNITY
Start: 2020-04-02

## 2020-04-15 RX ORDER — EPINEPHRINE 0.3 MG/.3ML
0.3 INJECTION SUBCUTANEOUS
COMMUNITY
Start: 2015-06-26

## 2020-04-15 RX ORDER — TETRAHYDROZOLINE HCL 0.05 %
DROPS OPHTHALMIC (EYE)
COMMUNITY
End: 2022-08-22

## 2020-04-15 NOTE — PROGRESS NOTES
Subjective:        [facility]  Cardiology Consult Note    Patient Identification:  Name: Lala Dejesus  Age: 59 y.o.  Sex: female  :  1960  MRN: [id number]             CC  We do conference per the CDC recommendations  Referred for family history      History of Present Illness:   H/o htn  Wt gain    59-year-old female has been referred with cardiac risk factors and strong family history on asking patient complains of atypical chest pain and shortness of breath    Lala Dejesus has been complaining of the shortness of breath mild-to-moderate in intensity with mild-to-moderate usually relieved with rest associated with anxiety and fatigue    Atypical cp    bp and ekg by Dr. Maxine marte    Little     Comorbid cardiac risk factors:     Past Medical History:  Past Medical History:   Diagnosis Date   • Abdominal pain     Abdominal pain and intolerance to regular metformin   • Abnormal Pap smear of cervix     Abnormal PAP S/P LEEP   • Allergic reaction     to morphine   • Allergic rhinitis    • Amenorrhea     due to menopause   • Breast lump     benign in past   • DM (diabetes mellitus) (CMS/HCC)    • FH: early death     Early sudden death in family in family at age 32   • Gastritis    • Gastroparesis     with recurrent nausea   • GERD (gastroesophageal reflux disease)    • H/O mammogram     2016 wnl  13   • Hair loss     due to nervous twisting   • Hiatal hernia     on scope    • History of bone density study 10/01/2012   • History of colonoscopy 2015    WNL   • History of EKG 2012   • Hyperlipidemia    • Insomnia    • Insulin resistance     with elevated glucoses trial of Janumet XR    • Low back pain    • Metabolic syndrome     with high insulin level   • Papanicolaou smear 2016    wnl   • Right knee DJD    • Stricture of esophagus     Strictures of esophagus   • Trauma     to left hip, left hand and left elbow at hotel 11/18/15- no sequaela   •  Weight loss     Intentional weight loss at [employer]     Past Surgical History:  Past Surgical History:   Procedure Laterality Date   • COLONOSCOPY  04/25/2014   • EYE SURGERY     • LASIK Right 09/01/2012   • LEEP N/A 1994    SURG   LEEP Procedure   • NOSE SURGERY        Allergies:  Allergies   Allergen Reactions   • Ketorolac Anaphylaxis   • Morphine And Related Anaphylaxis   • Aspirin Nausea And Vomiting   • Metformin And Related Other (See Comments)     Reaction: severe stomach cramp     Home Meds:    (Not in a hospital admission)  Current Meds:   [unfilled]  Social History:   Social History     Tobacco Use   • Smoking status: Never Smoker   Substance Use Topics   • Alcohol use: Yes     Comment: rare      Family History:  Family History   Problem Relation Age of Onset   • Hypertension Mother    • Hypertension Sister    • Obesity Sister    • Stroke Sister    • Heart disease Father    • Stroke Father    • Heart attack Father    • Heart disease Other    • Sudden death Other    • Heart attack Paternal Grandfather    • Stroke Paternal Grandfather         Review of Systems    Constitutional: No weakness,fatigue, fever, rigors, chills   Eyes: No vision changes, eye pain   ENT/oropharynx: No difficulty swallowing, sore throat, epistaxis, changes in hearing   Cardiovascular: Chest pain and sob   Respiratory: No shortness of breath, dyspnea on exertion, cough, wheezing hemoptysis   Gastrointestinal: No abdominal pain, nausea, vomiting, diarrhea, bloody stools   Genitourinary: No hematuria, dysuria   Neurological: No headache, tremors, numbness,  one-sided weakness    Musculoskeletal: No cramps, myalgias,  joint pain, joint swelling   Integument: No rash, edema           Constitutional:       Physical Exam   General:  Appears in no acute distress  Eyes: PERTL,  HEENT:  No JVD. Thyroid not visibly enlarged. No mucosal pallor or cyanosis  Respiratory: no deformities  Cardiovascular: No obvious deformities  a  Gastrointestinal: Deferred   musculoskeletal: MOORE x4. No abnormal movements  Extremities: No digital clubbing or cyanosis  Skin: Color pink. Skin warm and dry to touch. No rashes  No xanthoma  Neuro: AAO x3 CN II-XII grossly intact          Procedures        Cardiographics  ECG:     Telemetry:    Echocardiogram:     Imaging  Chest X-ray:     Lab Review               @[email or web address]@              Assessment:/ Recommendations / Plan:   Patient Active Problem List   Diagnosis   • Patchy loss of hair   • Metabolic syndrome with insulin resistance   • Vitamin B12 deficiency   • Gastroparesis with metabolic syndrome   • Esophageal hiatal hernia   • Stricture of esophagus   • Paradoxical insomnia   • Menopausal syndrome   • Family history of early death (Dad at age 32)   • Allergic rhinitis   • Laceration of ankle with tendon involvement   • Bronchitis   • Cough   • Acute bacterial conjunctivitis   • Fall (on) (from) other stairs and steps, initial encounter   • Right shoulder injury   • Numbness of right hand   • Hematuria                    ICD-10-CM ICD-9-CM   1. FH ischemic heart disease Z82.49 V17.3   2. Precordial pain R07.2 786.51   3. Weight gain R63.5 783.1   4. SOB (shortness of breath) R06.02 786.05   5. Hyperlipidemia, unspecified hyperlipidemia type E78.5 272.4     There are no diagnoses linked to this encounter.  With no significant symptoms at this point considering COVID-19 we will wait for approximately 1 month for office visit and possible stress test and echocardiogram prior to that  Labs/tests ordered for am    This patient has consented to a telehealth visit via VIDEO. The visit was scheduled as a VIDEO visit to comply with patient safety concerns in accordance with CDC recommendations.  All vitals recorded within this visit are reported by the patient.  I spent  25 minutes in total including but not limited to the 20 minutes spent in direct conversation with this patient.     Maribel Serra,  MD  4/15/2020, 08:57      EMR Dragon/Transcription:   Dictated utilizing Dragon dictation

## 2020-04-24 ENCOUNTER — LAB (OUTPATIENT)
Dept: LAB | Facility: HOSPITAL | Age: 60
End: 2020-04-24

## 2020-04-24 ENCOUNTER — TRANSCRIBE ORDERS (OUTPATIENT)
Dept: ADMINISTRATIVE | Facility: HOSPITAL | Age: 60
End: 2020-04-24

## 2020-04-24 ENCOUNTER — HOSPITAL ENCOUNTER (OUTPATIENT)
Dept: GENERAL RADIOLOGY | Facility: HOSPITAL | Age: 60
Discharge: HOME | End: 2020-04-24
Admitting: INTERNAL MEDICINE

## 2020-04-24 DIAGNOSIS — R09.89 ABNORMAL CHEST SOUNDS: ICD-10-CM

## 2020-04-24 DIAGNOSIS — R05.9 COUGH: ICD-10-CM

## 2020-04-24 DIAGNOSIS — R06.02 SHORTNESS OF BREATH: ICD-10-CM

## 2020-04-24 DIAGNOSIS — R09.89 ABNORMAL CHEST SOUNDS: Primary | ICD-10-CM

## 2020-04-24 LAB
ALBUMIN SERPL-MCNC: 4.1 G/DL (ref 3.5–5.2)
ALBUMIN/GLOB SERPL: 0.9 G/DL
ALP SERPL-CCNC: 130 U/L (ref 39–117)
ALT SERPL W P-5'-P-CCNC: 17 U/L (ref 1–33)
ANION GAP SERPL CALCULATED.3IONS-SCNC: 12.1 MMOL/L (ref 5–15)
AST SERPL-CCNC: 12 U/L (ref 1–32)
BASOPHILS # BLD AUTO: 0.03 10*3/MM3 (ref 0–0.2)
BASOPHILS NFR BLD AUTO: 0.2 % (ref 0–1.5)
BILIRUB SERPL-MCNC: 0.5 MG/DL (ref 0.2–1.2)
BUN BLD-MCNC: 8 MG/DL (ref 6–20)
BUN/CREAT SERPL: 12.5 (ref 7–25)
CALCIUM SPEC-SCNC: 9.7 MG/DL (ref 8.6–10.5)
CHLORIDE SERPL-SCNC: 96 MMOL/L (ref 98–107)
CO2 SERPL-SCNC: 26.9 MMOL/L (ref 22–29)
CREAT BLD-MCNC: 0.64 MG/DL (ref 0.57–1)
DEPRECATED RDW RBC AUTO: 39.2 FL (ref 37–54)
EOSINOPHIL # BLD AUTO: 0.02 10*3/MM3 (ref 0–0.4)
EOSINOPHIL NFR BLD AUTO: 0.1 % (ref 0.3–6.2)
ERYTHROCYTE [DISTWIDTH] IN BLOOD BY AUTOMATED COUNT: 12.5 % (ref 12.3–15.4)
ERYTHROCYTE [SEDIMENTATION RATE] IN BLOOD: 53 MM/HR (ref 0–30)
GFR SERPL CREATININE-BSD FRML MDRD: 116 ML/MIN/1.73
GLOBULIN UR ELPH-MCNC: 4.4 GM/DL
GLUCOSE BLD-MCNC: 218 MG/DL (ref 65–99)
HCT VFR BLD AUTO: 38 % (ref 34–46.6)
HGB BLD-MCNC: 13 G/DL (ref 12–15.9)
IMM GRANULOCYTES # BLD AUTO: 0.07 10*3/MM3 (ref 0–0.05)
IMM GRANULOCYTES NFR BLD AUTO: 0.4 % (ref 0–0.5)
LYMPHOCYTES # BLD AUTO: 1.92 10*3/MM3 (ref 0.7–3.1)
LYMPHOCYTES NFR BLD AUTO: 10.5 % (ref 19.6–45.3)
MCH RBC QN AUTO: 29.3 PG (ref 26.6–33)
MCHC RBC AUTO-ENTMCNC: 34.2 G/DL (ref 31.5–35.7)
MCV RBC AUTO: 85.6 FL (ref 79–97)
MONOCYTES # BLD AUTO: 1.13 10*3/MM3 (ref 0.1–0.9)
MONOCYTES NFR BLD AUTO: 6.2 % (ref 5–12)
NEUTROPHILS # BLD AUTO: 15.06 10*3/MM3 (ref 1.7–7)
NEUTROPHILS NFR BLD AUTO: 82.6 % (ref 42.7–76)
NRBC BLD AUTO-RTO: 0 /100 WBC (ref 0–0.2)
PLATELET # BLD AUTO: 283 10*3/MM3 (ref 140–450)
PMV BLD AUTO: 11.5 FL (ref 6–12)
POTASSIUM BLD-SCNC: 4.3 MMOL/L (ref 3.5–5.2)
PROT SERPL-MCNC: 8.5 G/DL (ref 6–8.5)
RBC # BLD AUTO: 4.44 10*6/MM3 (ref 3.77–5.28)
SODIUM BLD-SCNC: 135 MMOL/L (ref 136–145)
WBC NRBC COR # BLD: 18.23 10*3/MM3 (ref 3.4–10.8)

## 2020-04-24 PROCEDURE — 36415 COLL VENOUS BLD VENIPUNCTURE: CPT

## 2020-04-24 PROCEDURE — 85025 COMPLETE CBC W/AUTO DIFF WBC: CPT | Performed by: INTERNAL MEDICINE

## 2020-04-24 PROCEDURE — 71046 X-RAY EXAM CHEST 2 VIEWS: CPT

## 2020-04-24 PROCEDURE — 80053 COMPREHEN METABOLIC PANEL: CPT | Performed by: INTERNAL MEDICINE

## 2020-04-24 PROCEDURE — 85652 RBC SED RATE AUTOMATED: CPT | Performed by: INTERNAL MEDICINE

## 2020-06-16 ENCOUNTER — HOSPITAL ENCOUNTER (OUTPATIENT)
Dept: CARDIOLOGY | Facility: HOSPITAL | Age: 60
Discharge: HOME | End: 2020-06-16
Admitting: INTERNAL MEDICINE

## 2020-06-16 ENCOUNTER — HOSPITAL ENCOUNTER (OUTPATIENT)
Dept: CARDIOLOGY | Facility: HOSPITAL | Age: 60
Discharge: HOME | End: 2020-06-16

## 2020-06-16 VITALS
DIASTOLIC BLOOD PRESSURE: 78 MMHG | HEART RATE: 72 BPM | RESPIRATION RATE: 16 BRPM | OXYGEN SATURATION: 100 % | SYSTOLIC BLOOD PRESSURE: 124 MMHG | BODY MASS INDEX: 39.44 KG/M2 | HEIGHT: 64 IN | WEIGHT: 231 LBS

## 2020-06-16 PROCEDURE — A9500 TC99M SESTAMIBI: HCPCS | Performed by: INTERNAL MEDICINE

## 2020-06-16 PROCEDURE — 78452 HT MUSCLE IMAGE SPECT MULT: CPT | Performed by: INTERNAL MEDICINE

## 2020-06-16 PROCEDURE — 93016 CV STRESS TEST SUPVJ ONLY: CPT | Performed by: NURSE PRACTITIONER

## 2020-06-16 PROCEDURE — 93017 CV STRESS TEST TRACING ONLY: CPT

## 2020-06-16 PROCEDURE — 0 TECHNETIUM SESTAMIBI: Performed by: INTERNAL MEDICINE

## 2020-06-16 PROCEDURE — 93306 TTE W/DOPPLER COMPLETE: CPT | Performed by: INTERNAL MEDICINE

## 2020-06-16 PROCEDURE — 93018 CV STRESS TEST I&R ONLY: CPT | Performed by: INTERNAL MEDICINE

## 2020-06-16 PROCEDURE — 93306 TTE W/DOPPLER COMPLETE: CPT

## 2020-06-16 PROCEDURE — 78452 HT MUSCLE IMAGE SPECT MULT: CPT

## 2020-06-16 RX ADMIN — TECHNETIUM TC 99M SESTAMIBI 1 DOSE: 1 INJECTION INTRAVENOUS at 10:15

## 2020-06-16 RX ADMIN — TECHNETIUM TC 99M SESTAMIBI 1 DOSE: 1 INJECTION INTRAVENOUS at 08:24

## 2020-06-17 LAB
AORTIC DIMENSIONLESS INDEX: 0.7 (DI)
BH CV ECHO MEAS - AO MAX PG (FULL): 7.4 MMHG
BH CV ECHO MEAS - AO MAX PG: 14.7 MMHG
BH CV ECHO MEAS - AO MEAN PG (FULL): 3.3 MMHG
BH CV ECHO MEAS - AO MEAN PG: 7.5 MMHG
BH CV ECHO MEAS - AO V2 MAX: 191.4 CM/SEC
BH CV ECHO MEAS - AO V2 MEAN: 128 CM/SEC
BH CV ECHO MEAS - AO V2 VTI: 40.9 CM
BH CV ECHO MEAS - AVA(I,A): 1.7 CM^2
BH CV ECHO MEAS - AVA(I,D): 1.7 CM^2
BH CV ECHO MEAS - AVA(V,A): 1.8 CM^2
BH CV ECHO MEAS - AVA(V,D): 1.8 CM^2
BH CV ECHO MEAS - BSA(HAYCOCK): 2.2 M^2
BH CV ECHO MEAS - BSA: 2.1 M^2
BH CV ECHO MEAS - BZI_BMI: 39.7 KILOGRAMS/M^2
BH CV ECHO MEAS - BZI_METRIC_HEIGHT: 162.6 CM
BH CV ECHO MEAS - BZI_METRIC_WEIGHT: 104.8 KG
BH CV ECHO MEAS - EDV(CUBED): 113.6 ML
BH CV ECHO MEAS - EDV(MOD-SP2): 66 ML
BH CV ECHO MEAS - EDV(MOD-SP4): 60 ML
BH CV ECHO MEAS - EDV(TEICH): 109.8 ML
BH CV ECHO MEAS - EF(CUBED): 78 %
BH CV ECHO MEAS - EF(MOD-BP): 63.3 %
BH CV ECHO MEAS - EF(MOD-SP2): 69.7 %
BH CV ECHO MEAS - EF(MOD-SP4): 58.3 %
BH CV ECHO MEAS - EF(TEICH): 70 %
BH CV ECHO MEAS - ESV(CUBED): 25 ML
BH CV ECHO MEAS - ESV(MOD-SP2): 20 ML
BH CV ECHO MEAS - ESV(MOD-SP4): 25 ML
BH CV ECHO MEAS - ESV(TEICH): 32.9 ML
BH CV ECHO MEAS - FS: 39.6 %
BH CV ECHO MEAS - IVS/LVPW: 0.8
BH CV ECHO MEAS - IVSD: 0.72 CM
BH CV ECHO MEAS - LAT PEAK E' VEL: 12.9 CM/SEC
BH CV ECHO MEAS - LV DIASTOLIC VOL/BSA (35-75): 28.9 ML/M^2
BH CV ECHO MEAS - LV MASS(C)D: 131 GRAMS
BH CV ECHO MEAS - LV MASS(C)DI: 63 GRAMS/M^2
BH CV ECHO MEAS - LV MAX PG: 7.3 MMHG
BH CV ECHO MEAS - LV MEAN PG: 4.3 MMHG
BH CV ECHO MEAS - LV SYSTOLIC VOL/BSA (12-30): 12 ML/M^2
BH CV ECHO MEAS - LV V1 MAX: 134.8 CM/SEC
BH CV ECHO MEAS - LV V1 MEAN: 96.5 CM/SEC
BH CV ECHO MEAS - LV V1 VTI: 27.5 CM
BH CV ECHO MEAS - LVIDD: 4.8 CM
BH CV ECHO MEAS - LVIDS: 2.9 CM
BH CV ECHO MEAS - LVLD AP2: 6.6 CM
BH CV ECHO MEAS - LVLD AP4: 6.7 CM
BH CV ECHO MEAS - LVLS AP2: 5.5 CM
BH CV ECHO MEAS - LVLS AP4: 5 CM
BH CV ECHO MEAS - LVOT AREA (M): 2.5 CM^2
BH CV ECHO MEAS - LVOT AREA: 2.5 CM^2
BH CV ECHO MEAS - LVOT DIAM: 1.8 CM
BH CV ECHO MEAS - LVPWD: 0.9 CM
BH CV ECHO MEAS - MED PEAK E' VEL: 10.3 CM/SEC
BH CV ECHO MEAS - MV A DUR: 0.1 SEC
BH CV ECHO MEAS - MV A MAX VEL: 100.4 CM/SEC
BH CV ECHO MEAS - MV DEC SLOPE: 763.2 CM/SEC^2
BH CV ECHO MEAS - MV DEC TIME: 166 SEC
BH CV ECHO MEAS - MV E MAX VEL: 103.3 CM/SEC
BH CV ECHO MEAS - MV E/A: 1
BH CV ECHO MEAS - MV MAX PG: 6.7 MMHG
BH CV ECHO MEAS - MV MEAN PG: 3.3 MMHG
BH CV ECHO MEAS - MV P1/2T MAX VEL: 138.5 CM/SEC
BH CV ECHO MEAS - MV P1/2T: 53.1 MSEC
BH CV ECHO MEAS - MV V2 MAX: 129 CM/SEC
BH CV ECHO MEAS - MV V2 MEAN: 86 CM/SEC
BH CV ECHO MEAS - MV V2 VTI: 25.9 CM
BH CV ECHO MEAS - MVA P1/2T LCG: 1.6 CM^2
BH CV ECHO MEAS - MVA(P1/2T): 4.1 CM^2
BH CV ECHO MEAS - MVA(VTI): 2.7 CM^2
BH CV ECHO MEAS - PA ACC TIME: 0.04 SEC
BH CV ECHO MEAS - PA MAX PG (FULL): 3.5 MMHG
BH CV ECHO MEAS - PA MAX PG: 7.7 MMHG
BH CV ECHO MEAS - PA PR(ACCEL): 62.4 MMHG
BH CV ECHO MEAS - PA V2 MAX: 138.5 CM/SEC
BH CV ECHO MEAS - PULM A REVS DUR: 0.09 SEC
BH CV ECHO MEAS - PULM A REVS VEL: 36.9 CM/SEC
BH CV ECHO MEAS - PULM DIAS VEL: 46.6 CM/SEC
BH CV ECHO MEAS - PULM S/D: 1.5
BH CV ECHO MEAS - PULM SYS VEL: 69.8 CM/SEC
BH CV ECHO MEAS - RV BASE (<4.1) - OBSOLETE: 2.8 CM
BH CV ECHO MEAS - RV MAX PG: 4.2 MMHG
BH CV ECHO MEAS - RV MEAN PG: 2.2 MMHG
BH CV ECHO MEAS - RV V1 MAX: 101.9 CM/SEC
BH CV ECHO MEAS - RV V1 MEAN: 69.4 CM/SEC
BH CV ECHO MEAS - RV V1 VTI: 21.3 CM
BH CV ECHO MEAS - SI(CUBED): 42.6 ML/M^2
BH CV ECHO MEAS - SI(LVOT): 33.1 ML/M^2
BH CV ECHO MEAS - SI(MOD-SP2): 22.1 ML/M^2
BH CV ECHO MEAS - SI(MOD-SP4): 16.8 ML/M^2
BH CV ECHO MEAS - SI(TEICH): 37 ML/M^2
BH CV ECHO MEAS - SV(CUBED): 88.6 ML
BH CV ECHO MEAS - SV(LVOT): 68.9 ML
BH CV ECHO MEAS - SV(MOD-SP2): 46 ML
BH CV ECHO MEAS - SV(MOD-SP4): 35 ML
BH CV ECHO MEAS - SV(TEICH): 76.9 ML
BH CV ECHO MEAS - TAPSE (>1.6): 2.8 CM
BH CV ECHO MEASUREMENTS AVERAGE E/E' RATIO: 8.91
BH CV XLRA - RV BASE: 2.8 CM
BH CV XLRA - TDI S': 15.9 CM/SEC
LEFT ATRIUM VOLUME INDEX: 22.2 ML/M2
MAXIMAL PREDICTED HEART RATE: 163 BPM
STRESS TARGET HR: 139 BPM

## 2020-06-18 LAB
BH CV STRESS BP STAGE 1: NORMAL
BH CV STRESS BP STAGE 2: NORMAL
BH CV STRESS DURATION MIN STAGE 1: 3
BH CV STRESS DURATION MIN STAGE 2: 2
BH CV STRESS DURATION SEC STAGE 1: 0
BH CV STRESS DURATION SEC STAGE 2: 32
BH CV STRESS GRADE STAGE 1: 10
BH CV STRESS GRADE STAGE 2: 12
BH CV STRESS HR STAGE 1: 122
BH CV STRESS HR STAGE 2: 144
BH CV STRESS METS STAGE 1: 4.6
BH CV STRESS METS STAGE 2: 7
BH CV STRESS PROTOCOL 1: NORMAL
BH CV STRESS RECOVERY BP: NORMAL MMHG
BH CV STRESS RECOVERY HR: 92 BPM
BH CV STRESS RECOVERY O2: 100 %
BH CV STRESS SPEED STAGE 1: 1.7
BH CV STRESS SPEED STAGE 2: 2.5
BH CV STRESS STAGE 1: 1
BH CV STRESS STAGE 2: 2
LV EF NUC BP: 60 %
MAXIMAL PREDICTED HEART RATE: 163 BPM
PERCENT MAX PREDICTED HR: 88.34 %
STRESS BASELINE BP: NORMAL MMHG
STRESS BASELINE HR: 72 BPM
STRESS O2 SAT REST: 100 %
STRESS PERCENT HR: 104 %
STRESS POST ESTIMATED WORKLOAD: 7 METS
STRESS POST EXERCISE DUR MIN: 5 MIN
STRESS POST EXERCISE DUR SEC: 32 SEC
STRESS POST PEAK BP: NORMAL MMHG
STRESS POST PEAK HR: 144 BPM
STRESS TARGET HR: 139 BPM

## 2020-06-24 ENCOUNTER — OFFICE VISIT (OUTPATIENT)
Dept: CARDIOLOGY | Age: 60
End: 2020-06-24

## 2020-06-24 VITALS
WEIGHT: 231 LBS | HEART RATE: 88 BPM | DIASTOLIC BLOOD PRESSURE: 81 MMHG | SYSTOLIC BLOOD PRESSURE: 133 MMHG | HEIGHT: 64 IN | BODY MASS INDEX: 39.44 KG/M2

## 2020-06-24 DIAGNOSIS — E78.5 HYPERLIPIDEMIA, UNSPECIFIED HYPERLIPIDEMIA TYPE: Primary | ICD-10-CM

## 2020-06-24 DIAGNOSIS — R06.02 SOB (SHORTNESS OF BREATH): ICD-10-CM

## 2020-06-24 PROCEDURE — 99213 OFFICE O/P EST LOW 20 MIN: CPT | Performed by: INTERNAL MEDICINE

## 2021-03-24 ENCOUNTER — BULK ORDERING (OUTPATIENT)
Dept: CASE MANAGEMENT | Facility: OTHER | Age: 61
End: 2021-03-24

## 2021-03-24 DIAGNOSIS — Z23 IMMUNIZATION DUE: ICD-10-CM

## 2021-06-09 ENCOUNTER — IMMUNIZATION (OUTPATIENT)
Dept: VACCINE CLINIC | Facility: HOSPITAL | Age: 61
End: 2021-06-09

## 2021-06-09 PROCEDURE — 91300 HC SARSCOV02 VAC 30MCG/0.3ML IM: CPT | Performed by: INTERNAL MEDICINE

## 2021-06-09 PROCEDURE — 0001A: CPT | Performed by: INTERNAL MEDICINE

## 2021-06-30 ENCOUNTER — IMMUNIZATION (OUTPATIENT)
Dept: VACCINE CLINIC | Facility: HOSPITAL | Age: 61
End: 2021-06-30

## 2021-06-30 PROCEDURE — 91300 HC SARSCOV02 VAC 30MCG/0.3ML IM: CPT | Performed by: INTERNAL MEDICINE

## 2021-06-30 PROCEDURE — 0002A: CPT | Performed by: INTERNAL MEDICINE

## 2021-08-03 ENCOUNTER — OFFICE VISIT (OUTPATIENT)
Dept: CARDIOLOGY | Facility: CLINIC | Age: 61
End: 2021-08-03

## 2021-08-03 VITALS
HEIGHT: 64 IN | BODY MASS INDEX: 38.41 KG/M2 | SYSTOLIC BLOOD PRESSURE: 144 MMHG | DIASTOLIC BLOOD PRESSURE: 88 MMHG | WEIGHT: 225 LBS | HEART RATE: 66 BPM

## 2021-08-03 DIAGNOSIS — R06.02 SOB (SHORTNESS OF BREATH): ICD-10-CM

## 2021-08-03 DIAGNOSIS — E78.5 HYPERLIPIDEMIA, UNSPECIFIED HYPERLIPIDEMIA TYPE: Primary | ICD-10-CM

## 2021-08-03 PROCEDURE — 99214 OFFICE O/P EST MOD 30 MIN: CPT | Performed by: NURSE PRACTITIONER

## 2021-08-03 PROCEDURE — 93000 ELECTROCARDIOGRAM COMPLETE: CPT | Performed by: NURSE PRACTITIONER

## 2021-08-03 RX ORDER — MOXIFLOXACIN 5 MG/ML
SOLUTION/ DROPS OPHTHALMIC
COMMUNITY
Start: 2021-07-15 | End: 2022-08-22

## 2021-08-03 RX ORDER — OLOPATADINE HYDROCHLORIDE 1 MG/ML
1 SOLUTION/ DROPS OPHTHALMIC 2 TIMES DAILY
COMMUNITY
Start: 2021-07-15 | End: 2022-08-22

## 2021-08-03 RX ORDER — BROMFENAC SODIUM 0.7 MG/ML
SOLUTION/ DROPS OPHTHALMIC
COMMUNITY
Start: 2021-07-15

## 2021-08-03 RX ORDER — NETARSUDIL 0.2 MG/ML
SOLUTION/ DROPS OPHTHALMIC; TOPICAL
COMMUNITY
Start: 2021-07-15

## 2021-08-03 RX ORDER — LIFITEGRAST 50 MG/ML
SOLUTION/ DROPS OPHTHALMIC
COMMUNITY
Start: 2021-08-02 | End: 2022-08-22

## 2021-08-03 RX ORDER — DORZOLAMIDE HCL 20 MG/ML
1 SOLUTION/ DROPS OPHTHALMIC 2 TIMES DAILY
COMMUNITY
Start: 2021-05-27 | End: 2022-08-22

## 2021-08-03 RX ORDER — LATANOPROSTENE BUNOD 0.24 MG/ML
SOLUTION/ DROPS OPHTHALMIC
COMMUNITY
Start: 2021-07-15 | End: 2022-08-22

## 2021-08-03 RX ORDER — BRIMONIDINE TARTRATE/TIMOLOL 0.2%-0.5%
1 DROPS OPHTHALMIC (EYE) 2 TIMES DAILY
COMMUNITY
Start: 2021-06-13 | End: 2022-08-22

## 2021-08-03 NOTE — PROGRESS NOTES
Subjective:        Lala Dejesus is a 61 y.o. female who here for follow up    No chief complaint on file.    Hyperlipidemia    HPI    This is a 61-year-old female, who is well-known to my service.  She has a history to include hyperlipidemia, diabetes mellitus type, GERD, and shortness of breath.  Lipid panel on 6/8/2017 results revealed , HDL 62, , and triglycerides 83. Echo on 6/16/2020 revealed EF 63.3% and no evidence of pericardial effusion.  Stress test in 2020 indicated a normal myocardial perfusion study with no evidence of ischemia.      The following portions of the patient's history were reviewed and updated as appropriate: allergies, current medications, past family history, past medical history, past social history, past surgical history and problem list.    Past Medical History:   Diagnosis Date   • Abdominal pain     Abdominal pain and intolerance to regular metformin   • Abnormal Pap smear of cervix     Abnormal PAP S/P LEEP   • Allergic reaction     to morphine   • Allergic rhinitis    • Amenorrhea     due to menopause   • Breast lump     benign in past   • DM (diabetes mellitus) (CMS/HCC)    • FH: early death     Early sudden death in family in family at age 32   • Gastritis    • Gastroparesis     with recurrent nausea   • GERD (gastroesophageal reflux disease)    • H/O mammogram     03/31/2016 wnl  12/27/13 12/04/12   • Hair loss     due to nervous twisting   • Hiatal hernia     on scope 2004   • History of bone density study 10/01/2012   • History of colonoscopy 03/2015    WNL   • History of EKG 12/06/2012   • Hyperlipidemia    • Insomnia    • Insulin resistance     with elevated glucoses trial of Janumet XR 50/1000   • Low back pain    • Metabolic syndrome     with high insulin level   • Papanicolaou smear 01/2016    wnl   • Right knee DJD    • Stricture of esophagus     Strictures of esophagus   • Trauma     to left hip, left hand and left elbow at hotel 11/18/15- no sequaela    • Weight loss     Intentional weight loss at [facility]         reports that she has never smoked. She has never used smokeless tobacco. She reports current alcohol use. She reports that she does not use drugs.     Family History   Problem Relation Age of Onset   • Hypertension Mother    • Hypertension Sister    • Obesity Sister    • Stroke Sister    • Heart disease Father    • Stroke Father    • Heart attack Father    • Heart disease Other    • Sudden death Other    • Heart attack Paternal Grandfather    • Stroke Paternal Grandfather        ROS     Review of Systems  Constitutional: No wt loss, fever, fatigue  Gastrointestinal: No nausea, abdominal pain  Behavioral/Psych: No insomnia or anxiety  Cardiovascular: Denies chest pain, shortness of breath, syncope near syncope.      Objective:           Vitals and nursing note reviewed.   Constitutional:       Appearance: Well-developed.   HENT:      Head: Normocephalic.      Right Ear: External ear normal.      Left Ear: External ear normal.   Neck:      Vascular: No JVD.   Pulmonary:      Effort: Pulmonary effort is normal. No respiratory distress.      Breath sounds: Normal breath sounds. No stridor. No rales.   Cardiovascular:      Normal rate. Regular rhythm.      No gallop.   Pulses:     Intact distal pulses.   Abdominal:      General: Bowel sounds are normal. There is no distension.      Palpations: Abdomen is soft.      Tenderness: There is no abdominal tenderness. There is no guarding.   Musculoskeletal: Normal range of motion.         General: No tenderness.      Cervical back: Normal range of motion. Skin:     General: Skin is warm.   Neurological:      Mental Status: Alert and oriented to person, place, and time.      Deep Tendon Reflexes: Reflexes are normal and symmetric.   Psychiatric:         Judgment: Judgment normal.           ECG 12 Lead    Date/Time: 8/3/2021 10:24 AM  Performed by: Marlene Carpenter APRN  Authorized by: Marlene Carpenter,  MARNIE   Comparison: not compared with previous ECG   Rhythm: sinus rhythm  Rate: normal  BPM: 69    Clinical impression: normal ECG              2020    Interpretation Summary    · Calculated EF = 63.3%  · There is no evidence of pericardial effusion.          Interpretation Summary       · Moderate risk for ischemic heart disease.  · Findings consistent with a normal ECG stress test.  · Left ventricular ejection fraction is normal (Calculated EF = 60%).  · Myocardial perfusion imaging indicates a normal myocardial perfusion study with no evidence of ischemia.  · Impressions are consistent with a low risk study.     Asymptomatic for chest pain. ECG is negative for ischemia.   Ectopy: Occasional PAC in exercise, rare PVC in recovery and 1300 ms pause 5 minutes in recovery  B/P response: Hypertensive, Baseline 124/79, Peak 150/80  Recovery:  1 min 210/100  2 min 208/100  3 min 190/90  4 min 180/87  6 min  166/86  8 min  140/80  Exercise Tolerance: fair  Reached 17  Of  20 Marta Scale, Stage II  Duke treadmill score  3  Estimates an annual cardiovascular mortality of 1 %  And of 5-year survival of  93% . Using the Duke score there is an intermediate probability of angiographic coronary disease  Supervised by:  Marlene DYE.           Current Outpatient Medications:   •  albuterol sulfate  (90 Base) MCG/ACT inhaler, INL 2 PFS PO Q 4 H PRN, Disp: , Rfl:   •  Biotin (BIOTIN MAXIMUM STRENGTH) 10 MG tablet, Take  by mouth Daily., Disp: , Rfl:   •  Calcium Carb-Cholecalciferol (CALCIUM 600 + D) 600-200 MG-UNIT tablet, Take  by mouth., Disp: , Rfl:   •  Dapagliflozin-metFORMIN HCl (XIGDUO XR PO), Take  by mouth., Disp: , Rfl:   •  EPINEPHrine (EPIPEN) 0.3 MG/0.3ML solution auto-injector injection, Inject 0.3 mg into the appropriate muscle as directed by prescriber., Disp: , Rfl:   •  loratadine-pseudoephedrine (CLARITIN-D 12 HOUR) 5-120 MG per 12 hr tablet, Take  by mouth., Disp: , Rfl:   •  Multiple Vitamin  (MULTI VITAMIN DAILY PO), Take  by mouth., Disp: , Rfl:   •  Omega 3 1000 MG capsule, Take  by mouth., Disp: , Rfl:   •  tetrahydrozoline 0.05 % ophthalmic solution, Apply  to eye(s) as directed by provider., Disp: , Rfl:      Assessment:        Patient Active Problem List   Diagnosis   • Patchy loss of hair   • Metabolic syndrome with insulin resistance   • Vitamin B12 deficiency   • Gastroparesis with metabolic syndrome   • Esophageal hiatal hernia   • Stricture of esophagus   • Paradoxical insomnia   • Menopausal syndrome   • Family history of early death (Dad at age 32)   • Allergic rhinitis   • Laceration of ankle with tendon involvement   • Bronchitis   • Cough   • Acute bacterial conjunctivitis   • Fall (on) (from) other stairs and steps, initial encounter   • Right shoulder injury   • Numbness of right hand   • Hematuria   • FH ischemic heart disease   • Precordial pain   • Weight gain   • SOB (shortness of breath)   • Hyperlipidemia               Plan:   1.  Hyperlipidemia: His ischemic panel as above.  Previous lipid panel in 2017.  We will do a CMP and lipid profile.  May need to add cholesterol medication.    Risk of the hyperlipidemia, importance of the treatment has been explained. Pros and cons of the statins has been explained. Regular blood workup as well as side effects including the liver failure, myelopathy death has been explained.      2.  Shortness of breath: Multifactorial             No diagnosis found.    There are no diagnoses linked to this encounter.    COUNSELING: Jn Diaz was given to patient for the following topics: diagnostic results, risk factor reductions, impressions, risks and benefits of treatment options and importance of treatment compliance .       SMOKING COUNSELING: Denies    CMP and lipid profile and follow-up in 6 months, unless she needs to be seen sooner.    Sincerely,   MARNIE Queen  [facility]  08/03/21  09:13 EDT      EMR Dragon/Transcription disclaimer:   Much of this encounter note is an electronic transcription/translation of spoken language to printed text. The electronic translation of spoken language may permit erroneous, or at times, nonsensical words or phrases to be inadvertently transcribed; Although I have reviewed the note for such errors, some may still exist.

## 2022-08-22 ENCOUNTER — OFFICE VISIT (OUTPATIENT)
Dept: CARDIOLOGY | Facility: CLINIC | Age: 62
End: 2022-08-22

## 2022-08-22 VITALS
HEART RATE: 71 BPM | BODY MASS INDEX: 37.22 KG/M2 | WEIGHT: 218 LBS | DIASTOLIC BLOOD PRESSURE: 73 MMHG | HEIGHT: 64 IN | SYSTOLIC BLOOD PRESSURE: 108 MMHG

## 2022-08-22 DIAGNOSIS — R07.2 PRECORDIAL PAIN: ICD-10-CM

## 2022-08-22 DIAGNOSIS — E78.00 PURE HYPERCHOLESTEROLEMIA: Primary | ICD-10-CM

## 2022-08-22 PROCEDURE — 99213 OFFICE O/P EST LOW 20 MIN: CPT | Performed by: INTERNAL MEDICINE

## 2022-08-22 PROCEDURE — 93000 ELECTROCARDIOGRAM COMPLETE: CPT | Performed by: INTERNAL MEDICINE

## 2022-08-22 RX ORDER — NEOMYCIN SULFATE, POLYMYXIN B SULFATE, AND DEXAMETHASONE 3.5; 10000; 1 MG/G; [USP'U]/G; MG/G
OINTMENT OPHTHALMIC
COMMUNITY
Start: 2022-07-29 | End: 2022-08-22

## 2022-08-22 RX ORDER — PREDNISOLONE ACETATE 10 MG/ML
SUSPENSION/ DROPS OPHTHALMIC
COMMUNITY
Start: 2022-06-20

## 2022-08-22 NOTE — PROGRESS NOTES
1 yr follow up    Subjective:        Lala Dejesus is a 62 y.o. female who here for follow up    CC  Follow-up chest pain and hyperlipidemia  HPI  62-year-old female with precordial chest pains hyperlipidemia here for the follow-up with no complaints of chest pains tightness heaviness or the pressure sensation     Problems Addressed this Visit        Cardiac and Vasculature    Precordial pain    Hyperlipidemia - Primary      Diagnoses       Codes Comments    Pure hypercholesterolemia    -  Primary ICD-10-CM: E78.00  ICD-9-CM: 272.0     Precordial pain     ICD-10-CM: R07.2  ICD-9-CM: 786.51         .    The following portions of the patient's history were reviewed and updated as appropriate: allergies, current medications, past family history, past medical history, past social history, past surgical history and problem list.    Past Medical History:   Diagnosis Date   • Abdominal pain     Abdominal pain and intolerance to regular metformin   • Abnormal Pap smear of cervix     Abnormal PAP S/P LEEP   • Allergic reaction     to morphine   • Allergic rhinitis    • Amenorrhea     due to menopause   • Breast lump     benign in past   • DM (diabetes mellitus) (HCC)    • FH: early death     Early sudden death in family in family at age 32   • Gastritis    • Gastroparesis     with recurrent nausea   • GERD (gastroesophageal reflux disease)    • H/O mammogram     03/31/2016 wnl  12/27/13 12/04/12   • Hair loss     due to nervous twisting   • Hiatal hernia     on scope 2004   • History of bone density study 10/01/2012   • History of colonoscopy 03/2015    WNL   • History of EKG 12/06/2012   • Hyperlipidemia    • Insomnia    • Insulin resistance     with elevated glucoses trial of Janumet XR 50/1000   • Low back pain    • Metabolic syndrome     with high insulin level   • Papanicolaou smear 01/2016    wnl   • Right knee DJD    • Stricture of esophagus     Strictures of esophagus   • Trauma     to left hip, left hand and  "left elbow at hotel 11/18/15- no sequaela   • Weight loss     [facility] at [facility]     reports that she has never smoked. She has never used smokeless tobacco. She reports current alcohol use. She reports that she does not use drugs.   Family History   Problem Relation Age of Onset   • Hypertension Mother    • Hypertension Sister    • Obesity Sister    • Stroke Sister    • Heart disease Father    • Stroke Father    • Heart attack Father    • Heart disease Other    • Sudden death Other    • Heart attack Paternal Grandfather    • Stroke Paternal Grandfather        Review of Systems  Constitutional: No wt loss, fever, fatigue  Gastrointestinal: No nausea, abdominal pain  Behavioral/Psych: No insomnia or anxiety   Cardiovascular no chest pains or tightness in the chest  Objective:       Physical Exam  /73   Pulse 71   Ht 162.6 cm (64\")   Wt 98.9 kg (218 lb)   BMI 37.42 kg/m²   General appearance: No acute changes   Neck: Trachea midline; NECK, supple, no thyromegaly or lymphadenopathy   Lungs: Normal size and shape, normal breath sounds, equal distribution of air, no rales and rhonchi   CV: S1-S2 regular, no murmurs, no rub, no gallop   Abdomen: Soft, nontender; no masses , no abnormal abdominal sounds   Extremities: No deformity , normal color , no peripheral edema   Skin: Normal temperature, turgor and texture; no rash, ulcers            ECG 12 Lead    Date/Time: 8/22/2022 3:00 PM  Performed by: Maribel Serra MD  Authorized by: Maribel Serra MD   Comparison: compared with previous ECG   Similar to previous ECG  Rhythm: sinus rhythm  ST Flattening: all    Clinical impression: non-specific ECG              Echocardiogram:        Current Outpatient Medications:   •  albuterol sulfate  (90 Base) MCG/ACT inhaler, INL 2 PFS PO Q 4 H PRN, Disp: , Rfl:   •  Biotin 10 MG tablet, Take  by mouth Daily., Disp: , Rfl:   •  Calcium Carb-Cholecalciferol 600-200 MG-UNIT tablet, " Take  by mouth., Disp: , Rfl:   •  Dapagliflozin-metFORMIN HCl (XIGDUO XR PO), Take  by mouth., Disp: , Rfl:   •  EPINEPHrine (EPIPEN) 0.3 MG/0.3ML solution auto-injector injection, Inject 0.3 mg into the appropriate muscle as directed by prescriber., Disp: , Rfl:   •  loratadine-pseudoephedrine (CLARITIN-D 12-hour) 5-120 MG per 12 hr tablet, Take  by mouth., Disp: , Rfl:   •  Multiple Vitamin (MULTI VITAMIN DAILY PO), Take  by mouth., Disp: , Rfl:   •  Omega 3 1000 MG capsule, Take  by mouth., Disp: , Rfl:   •  prednisoLONE acetate (PRED FORTE) 1 % ophthalmic suspension, , Disp: , Rfl:   •  Prolensa 0.07 % solution, , Disp: , Rfl:   •  Rhopressa 0.02 % solution, INSTILL 1 DROP IN RIGHT EYE EVERY NIGHT AT BEDTIME, Disp: , Rfl:    Assessment:        Patient Active Problem List   Diagnosis   • Patchy loss of hair   • Metabolic syndrome with insulin resistance   • Vitamin B12 deficiency   • Gastroparesis with metabolic syndrome   • Esophageal hiatal hernia   • Stricture of esophagus   • Paradoxical insomnia   • Menopausal syndrome   • Family history of early death (Dad at age 32)   • Allergic rhinitis   • Laceration of ankle with tendon involvement   • Bronchitis   • Cough   • Acute bacterial conjunctivitis   • Fall (on) (from) other stairs and steps, initial encounter   • Right shoulder injury   • Numbness of right hand   • Hematuria   • FH ischemic heart disease   • Precordial pain   • Weight gain   • SOB (shortness of breath)   • Hyperlipidemia               Plan:            ICD-10-CM ICD-9-CM   1. Pure hypercholesterolemia  E78.00 272.0   2. Precordial pain  R07.2 786.51     1. Pure hypercholesterolemia  Continue current treatment    2. Precordial pain  Atypical       1 YR  COUNSELING:    Lala Diaz was given to patient for the following topics: diagnostic results, risk factor reductions, impressions, risks and benefits of treatment options and importance of treatment compliance .       SMOKING  COUNSELING:    [unfilled]    Dictated using Dragon dictation

## 2023-08-22 ENCOUNTER — OFFICE VISIT (OUTPATIENT)
Dept: CARDIOLOGY | Facility: CLINIC | Age: 63
End: 2023-08-22
Payer: COMMERCIAL

## 2023-08-22 VITALS
SYSTOLIC BLOOD PRESSURE: 135 MMHG | OXYGEN SATURATION: 99 % | HEART RATE: 77 BPM | WEIGHT: 205 LBS | BODY MASS INDEX: 35 KG/M2 | HEIGHT: 64 IN | DIASTOLIC BLOOD PRESSURE: 82 MMHG

## 2023-08-22 DIAGNOSIS — R94.31 ABNORMAL EKG: Primary | ICD-10-CM

## 2023-08-22 DIAGNOSIS — I44.7 LBBB (LEFT BUNDLE BRANCH BLOCK): ICD-10-CM

## 2023-08-22 DIAGNOSIS — E78.00 PURE HYPERCHOLESTEROLEMIA: ICD-10-CM

## 2023-08-22 PROCEDURE — 93000 ELECTROCARDIOGRAM COMPLETE: CPT

## 2023-08-22 PROCEDURE — 99214 OFFICE O/P EST MOD 30 MIN: CPT

## 2023-08-22 RX ORDER — NETARSUDIL AND LATANOPROST OPHTHALMIC SOLUTION, 0.02%/0.005% .2; .05 MG/ML; MG/ML
SOLUTION/ DROPS OPHTHALMIC; TOPICAL
COMMUNITY
Start: 2023-07-24

## 2023-08-22 RX ORDER — DOXYCYCLINE 50 MG/1
TABLET ORAL
COMMUNITY
Start: 2023-06-20

## 2023-08-22 RX ORDER — LOTEPREDNOL ETABONATE 3.8 MG/G
GEL OPHTHALMIC
COMMUNITY
Start: 2023-05-23

## 2023-08-22 RX ORDER — ADALIMUMAB 40MG/0.4ML
KIT SUBCUTANEOUS
COMMUNITY
Start: 2023-07-16

## 2023-08-22 RX ORDER — FOLIC ACID 1 MG/1
TABLET ORAL
COMMUNITY
Start: 2023-05-23

## 2023-08-22 RX ORDER — BRIMONIDINE TARTRATE 1 MG/ML
SOLUTION/ DROPS OPHTHALMIC
COMMUNITY
Start: 2023-05-23

## 2023-08-22 NOTE — PROGRESS NOTES
Subjective:        Lala Dejesus is a 63 y.o. female who here for follow up    Chief Complaint   Patient presents with    Hyperlipidemia       HPI    This is a 63-year-old female with hyperlipidemia, GERD, diabetes mellitus.  She follows up in office today for routine follow-up.  She reports no chest pain. Echo 6/16/2020 EF 63%, normal LV function.  Stress test 6/16/2020 myocardial perfusion imaging indicates a normal study with no evidence of ischemia.    The following portions of the patient's history were reviewed and updated as appropriate: allergies, current medications, past family history, past medical history, past social history, past surgical history and problem list.    Past Medical History:   Diagnosis Date    Abdominal pain     Abdominal pain and intolerance to regular metformin    Abnormal Pap smear of cervix     Abnormal PAP S/P LEEP    Allergic reaction     to morphine    Allergic rhinitis     Amenorrhea     due to menopause    Breast lump     benign in past    DM (diabetes mellitus)     FH: early death     Early sudden death in family in family at age 34    Gastritis     Gastroparesis     with recurrent nausea    GERD (gastroesophageal reflux disease)     H/O mammogram     03/31/2016 wnl  12/27/13 12/04/12    Hair loss     due to nervous twisting    Hiatal hernia     on scope 2004    History of bone density study 10/01/2012    History of colonoscopy 03/2015    WNL    History of EKG 12/06/2012    Hyperlipidemia     Insomnia     Insulin resistance     with elevated glucoses trial of Janumet XR 50/1000    Low back pain     Metabolic syndrome     with high insulin level    Papanicolaou smear 01/2016    wnl    Right knee DJD     Stricture of esophagus     Strictures of esophagus    Trauma     to left hip, left hand and left elbow at hotel 11/18/15- no sequaela    Weight loss     Intentional weight loss at [facility]         reports that she has never smoked. She has never used smokeless tobacco.  She reports current alcohol use. She reports that she does not use drugs.     Family History   Problem Relation Age of Onset    Hypertension Mother     Hypertension Sister     Obesity Sister     Stroke Sister     Heart disease Father     Stroke Father     Heart attack Father     Heart disease Other     Sudden death Other     Heart attack Paternal Grandfather     Stroke Paternal Grandfather        ROS     Review of Systems  Constitutional: No wt loss, fever, fatigue  Gastrointestinal: No nausea, abdominal pain  Behavioral/Psych: No insomnia or anxiety  Cardiovascular no chest pain or shortness of breath      Objective:           Vitals and nursing note reviewed.   Constitutional:       Appearance: Well-developed.   HENT:      Head: Normocephalic.      Right Ear: External ear normal.      Left Ear: External ear normal.   Neck:      Vascular: No JVD.   Pulmonary:      Effort: Pulmonary effort is normal. No respiratory distress.      Breath sounds: Normal breath sounds. No stridor. No rales.   Cardiovascular:      Normal rate. Regular rhythm.      No gallop.    Pulses:     Intact distal pulses.   Edema:     Peripheral edema absent.   Abdominal:      General: Bowel sounds are normal. There is no distension.      Palpations: Abdomen is soft.      Tenderness: There is no abdominal tenderness. There is no guarding.   Musculoskeletal: Normal range of motion.         General: No tenderness.      Cervical back: Normal range of motion. Skin:     General: Skin is warm.   Neurological:      Mental Status: Alert and oriented to person, place, and time.      Deep Tendon Reflexes: Reflexes are normal and symmetric.   Psychiatric:         Judgment: Judgment normal.         ECG 12 Lead    Date/Time: 8/22/2023 9:16 AM  Performed by: Sera Saeed APRN  Authorized by: Sera Saeed APRN   Comparison: compared with previous ECG from 8/22/2022  Comparison to previous ECG: New LBBB  Rhythm: sinus rhythm  Rate: normal  BPM:  69  Conduction: left bundle branch block    Clinical impression: abnormal EKG        Interpretation Summary       Moderate risk for ischemic heart disease.  Findings consistent with a normal ECG stress test.  Left ventricular ejection fraction is normal (Calculated EF = 60%).  Myocardial perfusion imaging indicates a normal myocardial perfusion study with no evidence of ischemia.  Impressions are consistent with a low risk study.    Interpretation Summary    Calculated EF = 63.3%  There is no evidence of pericardial effusion.      Current Outpatient Medications:     albuterol sulfate  (90 Base) MCG/ACT inhaler, INL 2 PFS PO Q 4 H PRN, Disp: , Rfl:     Alphagan P 0.1 % solution ophthalmic solution, , Disp: , Rfl:     Biotin 10 MG tablet, Take  by mouth Daily., Disp: , Rfl:     Calcium Carb-Cholecalciferol 600-200 MG-UNIT tablet, Take  by mouth., Disp: , Rfl:     Dapagliflozin-metFORMIN HCl (XIGDUO XR PO), Take  by mouth., Disp: , Rfl:     doxycycline (ADOXA) 50 MG tablet, , Disp: , Rfl:     folic acid (FOLVITE) 1 MG tablet, , Disp: , Rfl:     Humira Pen 40 MG/0.4ML Pen-injector Kit, , Disp: , Rfl:     loratadine-pseudoephedrine (CLARITIN-D 12-hour) 5-120 MG per 12 hr tablet, Take  by mouth., Disp: , Rfl:     Lotemax SM 0.38 % gel, , Disp: , Rfl:     methotrexate 2.5 MG tablet, , Disp: , Rfl:     Multiple Vitamin (MULTI VITAMIN DAILY PO), Take  by mouth., Disp: , Rfl:     Prolensa 0.07 % solution, , Disp: , Rfl:     Rocklatan 0.02-0.005 % solution, , Disp: , Rfl:      Assessment:        Patient Active Problem List   Diagnosis    Patchy loss of hair    Metabolic syndrome with insulin resistance    Vitamin B12 deficiency    Gastroparesis with metabolic syndrome    Esophageal hiatal hernia    Stricture of esophagus    Paradoxical insomnia    Menopausal syndrome    Family history of early death (Dad at age 32)    Allergic rhinitis    Laceration of ankle with tendon involvement    Bronchitis    Cough    Acute  bacterial conjunctivitis    Fall (on) (from) other stairs and steps, initial encounter    Right shoulder injury    Numbness of right hand    Hematuria    FH ischemic heart disease    Precordial pain    Weight gain    SOB (shortness of breath)    Hyperlipidemia               Plan:   LBBB: new compared to previous ecg, no chest pain. We will proceed with Cardiolite and echo    It was explained to the patient that stress testing carries 85% specificity/sensitivity, and does not rule out future cardiac event.  Risks of the procedure were explained to the patient including shortness of breath, induction of myocardial infarction, and dizziness.  Patient is agreeable to proceeding with stress testing.     2. Hyperlipidemia: she reports dr Vilchis manages her cholesterol and labs.  Continue statin therapy    Risk of the hyperlipidemia, importance of the treatment has been explained. Pros and cons of the statins has been explained. Regular blood workup as well as side effects including the liver failure, myelopathy death has been explained.               No diagnosis found.    There are no diagnoses linked to this encounter.    COUNSELING: obie Diaz was given to patient for the following topics: diagnostic results, risk factor reductions, impressions, risks and benefits of treatment options and importance of treatment compliance .       SMOKING COUNSELING: denies    Cardiolite and echo, follow up for results    Sincerely,   MARNIE Urban  [facility]  08/22/23  09:16 EDT    EMR Dragon/Transcription disclaimer:   Much of this encounter note is an electronic transcription/translation of spoken language to printed text. The electronic translation of spoken language may permit erroneous, or at times, nonsensical words or phrases to be inadvertently transcribed; Although I have reviewed the note for such errors, some may still exist.

## 2023-09-05 ENCOUNTER — HOSPITAL ENCOUNTER (OUTPATIENT)
Dept: CARDIOLOGY | Facility: HOSPITAL | Age: 63
Discharge: HOME | End: 2023-09-05
Payer: COMMERCIAL

## 2023-09-05 VITALS
BODY MASS INDEX: 35 KG/M2 | SYSTOLIC BLOOD PRESSURE: 148 MMHG | WEIGHT: 205 LBS | OXYGEN SATURATION: 99 % | DIASTOLIC BLOOD PRESSURE: 94 MMHG | HEART RATE: 72 BPM | HEIGHT: 64 IN

## 2023-09-05 VITALS — WEIGHT: 205 LBS | BODY MASS INDEX: 35 KG/M2 | HEIGHT: 64 IN

## 2023-09-05 PROCEDURE — A9500 TC99M SESTAMIBI: HCPCS | Performed by: INTERNAL MEDICINE

## 2023-09-05 PROCEDURE — 25010000002 REGADENOSON 0.4 MG/5ML SOLUTION: Performed by: INTERNAL MEDICINE

## 2023-09-05 PROCEDURE — 0 TECHNETIUM SESTAMIBI: Performed by: INTERNAL MEDICINE

## 2023-09-05 PROCEDURE — 78452 HT MUSCLE IMAGE SPECT MULT: CPT

## 2023-09-05 PROCEDURE — 93306 TTE W/DOPPLER COMPLETE: CPT

## 2023-09-05 PROCEDURE — 93306 TTE W/DOPPLER COMPLETE: CPT | Performed by: INTERNAL MEDICINE

## 2023-09-05 PROCEDURE — 93017 CV STRESS TEST TRACING ONLY: CPT

## 2023-09-05 PROCEDURE — 25010000002 AMINOPHYLLINE PER 250 MG

## 2023-09-05 RX ORDER — AMINOPHYLLINE DIHYDRATE 25 MG/ML
125 INJECTION, SOLUTION INTRAVENOUS ONCE
Status: COMPLETED | OUTPATIENT
Start: 2023-09-05 | End: 2023-09-05

## 2023-09-05 RX ORDER — ROSUVASTATIN CALCIUM 10 MG/1
10 TABLET, COATED ORAL DAILY
COMMUNITY

## 2023-09-05 RX ORDER — REGADENOSON 0.08 MG/ML
0.4 INJECTION, SOLUTION INTRAVENOUS
Status: COMPLETED | OUTPATIENT
Start: 2023-09-05 | End: 2023-09-05

## 2023-09-05 RX ORDER — AMINOPHYLLINE DIHYDRATE 25 MG/ML
125 INJECTION, SOLUTION INTRAVENOUS ONCE
Status: DISCONTINUED | OUTPATIENT
Start: 2023-09-05 | End: 2023-09-05

## 2023-09-05 RX ADMIN — AMINOPHYLLINE 125 MG: 25 INJECTION, SOLUTION INTRAVENOUS at 10:42

## 2023-09-05 RX ADMIN — TECHNETIUM TC 99M SESTAMIBI 1 DOSE: 1 INJECTION INTRAVENOUS at 08:18

## 2023-09-05 RX ADMIN — TECHNETIUM TC 99M SESTAMIBI 1 DOSE: 1 INJECTION INTRAVENOUS at 10:41

## 2023-09-05 RX ADMIN — REGADENOSON 0.4 MG: 0.08 INJECTION, SOLUTION INTRAVENOUS at 10:41

## 2023-09-06 LAB
ASCENDING AORTA: 3 CM
BH CV ECHO MEAS - ACS: 2.3 CM
BH CV ECHO MEAS - AO MAX PG: 11.2 MMHG
BH CV ECHO MEAS - AO MEAN PG: 5 MMHG
BH CV ECHO MEAS - AO ROOT DIAM: 3 CM
BH CV ECHO MEAS - AO V2 MAX: 167 CM/SEC
BH CV ECHO MEAS - AO V2 VTI: 32.9 CM
BH CV ECHO MEAS - AVA(I,D): 1.83 CM2
BH CV ECHO MEAS - EDV(CUBED): 131.1 ML
BH CV ECHO MEAS - EDV(MOD-SP2): 105 ML
BH CV ECHO MEAS - EDV(MOD-SP4): 95.1 ML
BH CV ECHO MEAS - EF(MOD-BP): 51.8 %
BH CV ECHO MEAS - EF(MOD-SP2): 56.9 %
BH CV ECHO MEAS - EF(MOD-SP4): 46.4 %
BH CV ECHO MEAS - ESV(CUBED): 39 ML
BH CV ECHO MEAS - ESV(MOD-SP2): 45.3 ML
BH CV ECHO MEAS - ESV(MOD-SP4): 51 ML
BH CV ECHO MEAS - FS: 33.3 %
BH CV ECHO MEAS - IVS/LVPW: 0.96 CM
BH CV ECHO MEAS - IVSD: 0.8 CM
BH CV ECHO MEAS - LAT PEAK E' VEL: 8.2 CM/SEC
BH CV ECHO MEAS - LV MASS(C)D: 143.8 GRAMS
BH CV ECHO MEAS - LV MAX PG: 5 MMHG
BH CV ECHO MEAS - LV MEAN PG: 3 MMHG
BH CV ECHO MEAS - LV V1 MAX: 112 CM/SEC
BH CV ECHO MEAS - LV V1 VTI: 21.2 CM
BH CV ECHO MEAS - LVIDD: 5.1 CM
BH CV ECHO MEAS - LVIDS: 3.4 CM
BH CV ECHO MEAS - LVOT AREA: 2.8 CM2
BH CV ECHO MEAS - LVOT DIAM: 1.9 CM
BH CV ECHO MEAS - LVPWD: 0.83 CM
BH CV ECHO MEAS - MED PEAK E' VEL: 6.2 CM/SEC
BH CV ECHO MEAS - MV A DUR: 0.2 SEC
BH CV ECHO MEAS - MV A MAX VEL: 99.9 CM/SEC
BH CV ECHO MEAS - MV DEC SLOPE: 398 CM/SEC2
BH CV ECHO MEAS - MV DEC TIME: 118 MSEC
BH CV ECHO MEAS - MV E MAX VEL: 45.1 CM/SEC
BH CV ECHO MEAS - MV E/A: 0.45
BH CV ECHO MEAS - MV MAX PG: 4.8 MMHG
BH CV ECHO MEAS - MV MEAN PG: 2 MMHG
BH CV ECHO MEAS - MV P1/2T: 72.3 MSEC
BH CV ECHO MEAS - MV V2 VTI: 27.4 CM
BH CV ECHO MEAS - MVA(P1/2T): 3 CM2
BH CV ECHO MEAS - MVA(VTI): 2.19 CM2
BH CV ECHO MEAS - PA ACC TIME: 0.07 SEC
BH CV ECHO MEAS - PA V2 MAX: 144 CM/SEC
BH CV ECHO MEAS - PULM A REVS DUR: 0.17 SEC
BH CV ECHO MEAS - PULM A REVS VEL: 30.6 CM/SEC
BH CV ECHO MEAS - PULM DIAS VEL: 50.9 CM/SEC
BH CV ECHO MEAS - PULM S/D: 1.35
BH CV ECHO MEAS - PULM SYS VEL: 68.9 CM/SEC
BH CV ECHO MEAS - QP/QS: 0.75
BH CV ECHO MEAS - RAP SYSTOLE: 8 MMHG
BH CV ECHO MEAS - RV MAX PG: 4.1 MMHG
BH CV ECHO MEAS - RV V1 MAX: 101 CM/SEC
BH CV ECHO MEAS - RV V1 VTI: 19.8 CM
BH CV ECHO MEAS - RVOT DIAM: 1.7 CM
BH CV ECHO MEAS - RVSP: 29.3 MMHG
BH CV ECHO MEAS - SV(LVOT): 60.1 ML
BH CV ECHO MEAS - SV(MOD-SP2): 59.7 ML
BH CV ECHO MEAS - SV(MOD-SP4): 44.1 ML
BH CV ECHO MEAS - SV(RVOT): 44.9 ML
BH CV ECHO MEAS - TAPSE (>1.6): 2.7 CM
BH CV ECHO MEAS - TR MAX PG: 21.3 MMHG
BH CV ECHO MEAS - TR MAX VEL: 230.5 CM/SEC
BH CV ECHO MEASUREMENTS AVERAGE E/E' RATIO: 6.26
BH CV XLRA - RV BASE: 2.8 CM
BH CV XLRA - RV LENGTH: 5.6 CM
BH CV XLRA - RV MID: 3 CM
BH CV XLRA - TDI S': 14 CM/SEC
SINUS: 3 CM
STJ: 2.5 CM

## 2023-09-07 LAB
BH CV REST NUCLEAR ISOTOPE DOSE: 10.9 MCI
BH CV STRESS BP STAGE 1: NORMAL
BH CV STRESS COMMENTS STAGE 1: NORMAL
BH CV STRESS DOSE REGADENOSON STAGE 1: 0.4
BH CV STRESS DURATION MIN STAGE 1: 1
BH CV STRESS DURATION SEC STAGE 1: 55
BH CV STRESS GRADE STAGE 1: 0
BH CV STRESS HR STAGE 1: 114
BH CV STRESS METS STAGE 1: 1.7
BH CV STRESS NUCLEAR ISOTOPE DOSE: 31.5 MCI
BH CV STRESS PROTOCOL 1: NORMAL
BH CV STRESS RECOVERY BP: NORMAL MMHG
BH CV STRESS RECOVERY HR: 77 BPM
BH CV STRESS RECOVERY O2: 99 %
BH CV STRESS SPEED STAGE 1: 1
BH CV STRESS STAGE 1: 1
LV EF NUC BP: 60 %
MAXIMAL PREDICTED HEART RATE: 159 BPM
PERCENT MAX PREDICTED HR: 71.7 %
STRESS BASELINE BP: NORMAL MMHG
STRESS BASELINE HR: 77 BPM
STRESS O2 SAT REST: 99 %
STRESS PERCENT HR: 84 %
STRESS POST ESTIMATED WORKLOAD: 1.7 METS
STRESS POST EXERCISE DUR MIN: 1 MIN
STRESS POST EXERCISE DUR SEC: 55 SEC
STRESS POST PEAK BP: NORMAL MMHG
STRESS POST PEAK HR: 114 BPM
STRESS TARGET HR: 135 BPM

## 2023-09-14 ENCOUNTER — OFFICE VISIT (OUTPATIENT)
Dept: CARDIOLOGY | Facility: CLINIC | Age: 63
End: 2023-09-14
Payer: COMMERCIAL

## 2023-09-14 VITALS
HEIGHT: 64 IN | DIASTOLIC BLOOD PRESSURE: 80 MMHG | BODY MASS INDEX: 35.34 KG/M2 | SYSTOLIC BLOOD PRESSURE: 120 MMHG | HEART RATE: 66 BPM | WEIGHT: 207 LBS

## 2023-09-14 DIAGNOSIS — R94.31 ABNORMAL EKG: Primary | ICD-10-CM

## 2023-09-14 DIAGNOSIS — E78.00 PURE HYPERCHOLESTEROLEMIA: ICD-10-CM

## 2023-09-14 DIAGNOSIS — R07.2 PRECORDIAL PAIN: ICD-10-CM

## 2023-09-14 DIAGNOSIS — I44.7 LBBB (LEFT BUNDLE BRANCH BLOCK): ICD-10-CM

## 2023-09-14 PROCEDURE — 99213 OFFICE O/P EST LOW 20 MIN: CPT | Performed by: INTERNAL MEDICINE
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
## 2024-06-20 ENCOUNTER — TRANSCRIBE ORDERS (OUTPATIENT)
Dept: ADMINISTRATIVE | Facility: HOSPITAL | Age: 64
End: 2024-06-20
Payer: COMMERCIAL

## 2024-06-20 DIAGNOSIS — Z78.0 POSTMENOPAUSAL: ICD-10-CM

## 2024-06-20 DIAGNOSIS — Z13.820 SCREENING FOR OSTEOPOROSIS: ICD-10-CM

## 2024-06-20 DIAGNOSIS — Z12.31 VISIT FOR SCREENING MAMMOGRAM: Primary | ICD-10-CM

## 2024-07-02 ENCOUNTER — TELEPHONE (OUTPATIENT)
Dept: ORTHOPEDIC SURGERY | Facility: CLINIC | Age: 64
End: 2024-07-02

## 2024-07-02 NOTE — TELEPHONE ENCOUNTER
Caller: PATIENT    Relationship to patient: SELF     Best call back number: [phone]    Chief complaint: BILATERAL KNEE PAIN    Type of visit: NEW PATIENT    If rescheduling, when is the original appointment: 07/02/24 AT 2:30 PM     Additional notes: PATIENT CALLED AFTER HER APPOINTMENT TIME TODAY TO LET US KNOW WHY SHE MISSED HER APPOINTMENT THAT WAS SCHEDULED AT 2:30 PM. PATIENT STATED SHE HAD A MEETING AND WAS JUST GETTING OUT OF HER MEETING. PATIENT HAS BEEN RESCHEDULED WITH [facility] BUT I WANTED TO INFORM YOUR OFFICE WHY THE PATIENT MISSED HER APPT TODAY. THANK YOU!

## 2024-07-15 ENCOUNTER — OFFICE VISIT (OUTPATIENT)
Dept: ORTHOPEDIC SURGERY | Facility: CLINIC | Age: 64
End: 2024-07-15
Payer: COMMERCIAL

## 2024-07-15 VITALS — HEIGHT: 64 IN | WEIGHT: 206.4 LBS | TEMPERATURE: 98 F | BODY MASS INDEX: 35.24 KG/M2

## 2024-07-15 DIAGNOSIS — R52 PAIN: Primary | ICD-10-CM

## 2024-07-15 DIAGNOSIS — M17.0 PRIMARY OSTEOARTHRITIS OF BOTH KNEES: ICD-10-CM

## 2024-07-15 PROCEDURE — 73562 X-RAY EXAM OF KNEE 3: CPT | Performed by: NURSE PRACTITIONER

## 2024-07-15 PROCEDURE — 99214 OFFICE O/P EST MOD 30 MIN: CPT | Performed by: NURSE PRACTITIONER

## 2024-07-15 NOTE — PROGRESS NOTES
"Patient: Lala Dejesus  YOB: 1960 64 y.o. female  Medical Record Number: [id number]    Chief Complaints:   Chief Complaint   Patient presents with    Left Knee - Follow-up, Pain    Right Knee - Follow-up, Pain       History of Present Illness:Lala Dejesus is a 64 y.o. female who presents as a new patient both myself as well as to the practice with complaints of increasing bilateral knee pain.  The patient reports she injured her right knee at age 14 when she was on a horse, she had quite a bit of swelling but at that time doctors opted not to do anything such as an aspiration or surgery.  Things did slowly improve, 18 years ago she slipped off of a curb tore a tendon in her right foot, then a couple years ago she was in [place] tripped and had a direct blow to the left knee, she again treated it conservatively, her knee pain stabilized unfortunately 2 months ago she was getting up out of a chair after prolonged sitting heard and felt a pop\" in her left knee acute onset of worsening in bilateral knee pain left greater than right.  She has tried conservative measures with minimal improvement.  She is not able to take anti-inflammatories because of stomach issues    Allergies:   Allergies   Allergen Reactions    Ketorolac Anaphylaxis    Morphine And Codeine Anaphylaxis    Aspirin Nausea And Vomiting    Metformin And Related Other (See Comments)     Reaction: severe stomach cramp       Medications:   Current Outpatient Medications   Medication Sig Dispense Refill    albuterol sulfate  (90 Base) MCG/ACT inhaler INL 2 PFS PO Q 4 H PRN      Alphagan P 0.1 % solution ophthalmic solution       Biotin 10 MG tablet Take  by mouth Daily.      Calcium Carb-Cholecalciferol 600-200 MG-UNIT tablet Take  by mouth.      Dapagliflozin-metFORMIN HCl (XIGDUO XR PO) Take  by mouth.      doxycycline (ADOXA) 50 MG tablet       folic acid (FOLVITE) 1 MG tablet       inFLIXimab (REMICADE IV) Infuse  into a venous " "catheter.      loratadine-pseudoephedrine (CLARITIN-D 12-hour) 5-120 MG per 12 hr tablet Take  by mouth.      Lotemax SM 0.38 % gel       methotrexate 2.5 MG tablet       Multiple Vitamin (MULTI VITAMIN DAILY PO) Take  by mouth.      Prolensa 0.07 % solution       Rocklatan 0.02-0.005 % solution       rosuvastatin (CRESTOR) 10 MG tablet Take 1 tablet by mouth Daily.       No current facility-administered medications for this visit.         The following portions of the patient's history were reviewed and updated as appropriate: allergies, current medications, past family history, past medical history, past social history, past surgical history and problem list.    Review of Systems:   14 point review of systems was performed. All systems negative except pertinent positives/negatives listed in HPI above    Physical Exam:   Vitals:    07/15/24 0829   Temp: 98 °F (36.7 °C)   Weight: 93.6 kg (206 lb 6.4 oz)   Height: 162.6 cm (64\")   PainSc:   2   PainLoc: Knee       General: A and O x 3, ASA, NAD    Skin clear no unusual lesions noted  Bilateral knees patient has no appreciable effusion 120 degrees flexion neutral in extension with significant patellofemoral crepitus noted bilaterally.  Positive Fernanda negative Lockman calf soft and nontender      Radiology:  Xrays 3views (ap,lateral, sunrise) bilateral knees were ordered and reviewed today secondary to increased pain patient has significant patellofemoral osteoarthritic changes noted bilaterally as well as arthritic changes noted throughout the right knee best seen on lateral view.  No comparative views available    Assessment/Plan: Osteoarthritis bilateral knees with increased pain    Patient and I discussed options, we discussed cortisone injections versus viscosupplementation, she would like to think about that get back with us.  Physical therapy, prescription given for ketoprofen lidocaine cream to be used topically and I will see the patient back as " needed      Rosalba Morrow, APRN  7/15/2024

## 2024-07-19 ENCOUNTER — HOSPITAL ENCOUNTER (EMERGENCY)
Facility: HOSPITAL | Age: 64
Discharge: HOME | End: 2024-07-19
Attending: EMERGENCY MEDICINE
Payer: COMMERCIAL

## 2024-07-19 ENCOUNTER — APPOINTMENT (OUTPATIENT)
Dept: GENERAL RADIOLOGY | Facility: HOSPITAL | Age: 64
End: 2024-07-19
Payer: COMMERCIAL

## 2024-07-19 VITALS
TEMPERATURE: 99.2 F | HEART RATE: 92 BPM | RESPIRATION RATE: 18 BRPM | SYSTOLIC BLOOD PRESSURE: 160 MMHG | DIASTOLIC BLOOD PRESSURE: 94 MMHG | OXYGEN SATURATION: 97 %

## 2024-07-19 DIAGNOSIS — U07.1 COVID-19: Primary | ICD-10-CM

## 2024-07-19 DIAGNOSIS — R79.89 ELEVATED LACTIC ACID LEVEL: ICD-10-CM

## 2024-07-19 LAB
ALBUMIN SERPL-MCNC: 4.4 G/DL (ref 3.5–5.2)
ALBUMIN/GLOB SERPL: 1.1 G/DL
ALP SERPL-CCNC: 123 U/L (ref 39–117)
ALT SERPL W P-5'-P-CCNC: 16 U/L (ref 1–33)
ANION GAP SERPL CALCULATED.3IONS-SCNC: 12 MMOL/L (ref 5–15)
AST SERPL-CCNC: 19 U/L (ref 1–32)
B PARAPERT DNA SPEC QL NAA+PROBE: NOT DETECTED
B PERT DNA SPEC QL NAA+PROBE: NOT DETECTED
BILIRUB SERPL-MCNC: 0.3 MG/DL (ref 0–1.2)
BUN SERPL-MCNC: 9 MG/DL (ref 8–23)
BUN/CREAT SERPL: 11 (ref 7–25)
C PNEUM DNA NPH QL NAA+NON-PROBE: NOT DETECTED
CALCIUM SPEC-SCNC: 9.4 MG/DL (ref 8.6–10.5)
CHLORIDE SERPL-SCNC: 101 MMOL/L (ref 98–107)
CO2 SERPL-SCNC: 27 MMOL/L (ref 22–29)
CREAT SERPL-MCNC: 0.82 MG/DL (ref 0.57–1)
D-LACTATE SERPL-SCNC: 2.1 MMOL/L (ref 0.5–2)
DEPRECATED RDW RBC AUTO: 40.8 FL (ref 37–54)
EGFRCR SERPLBLD CKD-EPI 2021: 81 ML/MIN/1.73
ERYTHROCYTE [DISTWIDTH] IN BLOOD BY AUTOMATED COUNT: 13 % (ref 12.3–15.4)
FLUAV H1 2009 PAND RNA NPH QL NAA+PROBE: NOT DETECTED
FLUAV H1 HA GENE NPH QL NAA+PROBE: NOT DETECTED
FLUAV H3 RNA NPH QL NAA+PROBE: NOT DETECTED
FLUAV SUBTYP SPEC NAA+PROBE: NOT DETECTED
FLUBV RNA ISLT QL NAA+PROBE: NOT DETECTED
GLOBULIN UR ELPH-MCNC: 4 GM/DL
GLUCOSE SERPL-MCNC: 135 MG/DL (ref 65–99)
HADV DNA SPEC NAA+PROBE: NOT DETECTED
HCOV 229E RNA SPEC QL NAA+PROBE: NOT DETECTED
HCOV HKU1 RNA SPEC QL NAA+PROBE: NOT DETECTED
HCOV NL63 RNA SPEC QL NAA+PROBE: NOT DETECTED
HCOV OC43 RNA SPEC QL NAA+PROBE: NOT DETECTED
HCT VFR BLD AUTO: 43.5 % (ref 34–46.6)
HGB BLD-MCNC: 14.1 G/DL (ref 12–15.9)
HMPV RNA NPH QL NAA+NON-PROBE: NOT DETECTED
HOLD SPECIMEN: NORMAL
HPIV1 RNA ISLT QL NAA+PROBE: NOT DETECTED
HPIV2 RNA SPEC QL NAA+PROBE: NOT DETECTED
HPIV3 RNA NPH QL NAA+PROBE: NOT DETECTED
HPIV4 P GENE NPH QL NAA+PROBE: NOT DETECTED
M PNEUMO IGG SER IA-ACNC: NOT DETECTED
MCH RBC QN AUTO: 28.4 PG (ref 26.6–33)
MCHC RBC AUTO-ENTMCNC: 32.4 G/DL (ref 31.5–35.7)
MCV RBC AUTO: 87.5 FL (ref 79–97)
NT-PROBNP SERPL-MCNC: 167 PG/ML (ref 0–900)
PLATELET # BLD AUTO: 237 10*3/MM3 (ref 140–450)
PMV BLD AUTO: 10.7 FL (ref 6–12)
POTASSIUM SERPL-SCNC: 3.9 MMOL/L (ref 3.5–5.2)
PROCALCITONIN SERPL-MCNC: 0.02 NG/ML (ref 0–0.25)
PROT SERPL-MCNC: 8.4 G/DL (ref 6–8.5)
RBC # BLD AUTO: 4.97 10*6/MM3 (ref 3.77–5.28)
RHINOVIRUS RNA SPEC NAA+PROBE: NOT DETECTED
RSV RNA NPH QL NAA+NON-PROBE: NOT DETECTED
SARS-COV-2 RNA NPH QL NAA+NON-PROBE: DETECTED
SODIUM SERPL-SCNC: 140 MMOL/L (ref 136–145)
TROPONIN T SERPL HS-MCNC: 7 NG/L
WBC NRBC COR # BLD AUTO: 5.75 10*3/MM3 (ref 3.4–10.8)

## 2024-07-19 PROCEDURE — 0202U NFCT DS 22 TRGT SARS-COV-2: CPT | Performed by: EMERGENCY MEDICINE

## 2024-07-19 PROCEDURE — 84484 ASSAY OF TROPONIN QUANT: CPT | Performed by: EMERGENCY MEDICINE

## 2024-07-19 PROCEDURE — 71045 X-RAY EXAM CHEST 1 VIEW: CPT

## 2024-07-19 PROCEDURE — 83605 ASSAY OF LACTIC ACID: CPT | Performed by: EMERGENCY MEDICINE

## 2024-07-19 PROCEDURE — 87040 BLOOD CULTURE FOR BACTERIA: CPT | Performed by: EMERGENCY MEDICINE

## 2024-07-19 PROCEDURE — 25810000003 SODIUM CHLORIDE 0.9 % SOLUTION: Performed by: PHYSICIAN ASSISTANT

## 2024-07-19 PROCEDURE — 85027 COMPLETE CBC AUTOMATED: CPT | Performed by: EMERGENCY MEDICINE

## 2024-07-19 PROCEDURE — 83880 ASSAY OF NATRIURETIC PEPTIDE: CPT | Performed by: EMERGENCY MEDICINE

## 2024-07-19 PROCEDURE — 84145 PROCALCITONIN (PCT): CPT | Performed by: EMERGENCY MEDICINE

## 2024-07-19 PROCEDURE — 99283 EMERGENCY DEPT VISIT LOW MDM: CPT

## 2024-07-19 PROCEDURE — 80053 COMPREHEN METABOLIC PANEL: CPT | Performed by: EMERGENCY MEDICINE

## 2024-07-19 PROCEDURE — 36415 COLL VENOUS BLD VENIPUNCTURE: CPT | Performed by: EMERGENCY MEDICINE

## 2024-07-19 PROCEDURE — 96360 HYDRATION IV INFUSION INIT: CPT

## 2024-07-19 RX ORDER — ONDANSETRON 4 MG/1
4 TABLET, ORALLY DISINTEGRATING ORAL 4 TIMES DAILY PRN
Qty: 20 TABLET | Refills: 0 | Status: SHIPPED | OUTPATIENT
Start: 2024-07-19

## 2024-07-19 RX ORDER — DEXTROMETHORPHAN HYDROBROMIDE AND PROMETHAZINE HYDROCHLORIDE 15; 6.25 MG/5ML; MG/5ML
5 SYRUP ORAL 4 TIMES DAILY PRN
Qty: 180 ML | Refills: 0 | Status: SHIPPED | OUTPATIENT
Start: 2024-07-19

## 2024-07-19 RX ORDER — ACETAMINOPHEN 500 MG
TABLET ORAL
Status: ACTIVE
Start: 2024-07-19 | End: 2024-07-20

## 2024-07-19 RX ORDER — BENZONATATE 200 MG/1
200 CAPSULE ORAL 3 TIMES DAILY PRN
Qty: 20 CAPSULE | Refills: 0 | Status: SHIPPED | OUTPATIENT
Start: 2024-07-19

## 2024-07-19 RX ORDER — ACETAMINOPHEN 500 MG
1000 TABLET ORAL ONCE
Status: COMPLETED | OUTPATIENT
Start: 2024-07-19 | End: 2024-07-19

## 2024-07-19 RX ADMIN — ACETAMINOPHEN 1000 MG: 500 TABLET ORAL at 16:57

## 2024-07-19 RX ADMIN — SODIUM CHLORIDE 1000 ML: 9 INJECTION, SOLUTION INTRAVENOUS at 17:28

## 2024-07-19 NOTE — ED PROVIDER NOTES
MD ATTESTATION NOTE      Brief HPI: 62-year-old female with a history of asthma who presents emergency room for cough and fever.  The patient states she has been exposed to family with similar symptoms 2 days ago and that her symptoms began yesterday.  She reports a dry cough with congestion chills and shortness of breath that is worse with exertion..  She states she took her inhaler at home with minimal relief.  The patient presents to the ER with a temperature of 101 and room air oxygen saturations in the mid 90s    General : 64-year-old patient is awake alert and oriented  HEENT: NCAT  CV: Heart is regular with no murmurs  Respiratory: Bilateral wheezes  Abd: Soft and nontender  Ext: No acute abnormalities  Skin: No rash  Neuro: Awake with a nonfocal neuro exam  Psych: Normal mood and affect      Plan: Will treat the patient with Tylenol while obtaining labs and chest x-ray for further evaluation    My independent interpretation of patient's chest x-ray is no infiltrate or CHF    The patient is COVID-positive.    The patient's oxygen level remained in the mid upper 90s.  At this point the patient has mild symptomatology and is not hypoxic.  I believe she is stable for discharge and close outpatient follow-up.  She was given careful return emergency room instructions.    SHARED VISIT: This visit was performed by BOTH a physician and an APC. The substantive portion of the medical decision making was performed by this attesting physician who made or approved the management plan and takes responsibility for patient management. All studies in the APC note (if performed) were independently interpreted by me.         Reginald Contreras MD  07/19/24 1950

## 2024-07-19 NOTE — ED PROVIDER NOTES
EMERGENCY DEPARTMENT ENCOUNTER      Room Number:  43/43  PCP: Martin Vilchis MD  Independent Historians: Patient  Patient Care Team:  Martin Vilchis MD as PCP - General (Internal Medicine)       HPI:  Chief Complaint: Cough    A complete HPI/ROS/PMH/PSH/SH/FH are unobtainable due to: None    Chronic or social conditions impacting patient care (Social Determinants of Health): None      Context: Lala Dejesus is a 64 y.o. female with a PMH significant for asthma, recurrent bronchitis who presents to the ED c/o acute cough.  The patient reports that she was exposed to a family member with similar symptoms on Wednesday and started with symptoms Thursday afternoon.  She started with a mild dry cough which progressed to upper respiratory congestion, chills, mild shortness of breath.  She reports that symptoms feel similar to prior bronchitis flareups.  She does a history of asthma and took a DuoNeb treatment with some mild relief at home.  No chest pain, dizziness, syncope, nausea, vomiting.      Upon review of prior external notes (non-ED) -and- Review of prior external test results outside of this encounter it appears the patient was evaluated in the office with orthopedics for osteoarthritis of the knees on July 15, 2024.  The patient had a normal thyroid panel on 6/8/2017 and a normal hemoglobin A1c on 2/8/2017.      PAST MEDICAL HISTORY  Active Ambulatory Problems     Diagnosis Date Noted    Patchy loss of hair 05/09/2016    Metabolic syndrome with insulin resistance 05/09/2016    Vitamin B12 deficiency 05/09/2016    Gastroparesis with metabolic syndrome 05/09/2016    Esophageal hiatal hernia 05/09/2016    Stricture of esophagus 05/09/2016    Paradoxical insomnia 05/09/2016    Menopausal syndrome 05/09/2016    Family history of early death (Dad at age 32) 05/09/2016    Allergic rhinitis 05/09/2016    Laceration of ankle with tendon involvement 05/09/2016    Bronchitis 11/17/2016    Cough 11/17/2016    Acute  bacterial conjunctivitis 11/17/2016    Fall (on) (from) other stairs and steps, initial encounter 02/08/2017    Right shoulder injury 02/08/2017    Numbness of right hand 02/08/2017    Hematuria 03/09/2017     ischemic heart disease 04/15/2020    Precordial pain 04/15/2020    Weight gain 04/15/2020    SOB (shortness of breath) 04/15/2020    Hyperlipidemia 04/15/2020     Resolved Ambulatory Problems     Diagnosis Date Noted    No Resolved Ambulatory Problems     Past Medical History:   Diagnosis Date    Abdominal pain     Abnormal Pap smear of cervix     Allergic reaction     Amenorrhea     Ankle sprain 20+ years    Breast lump     DM (diabetes mellitus)     FH: early death     Gastritis     GERD (gastroesophageal reflux disease)     Glaucoma     H/O mammogram     Hair loss     Hiatal hernia     History of bone density study 10/01/2012    History of colonoscopy 03/2015    History of EKG 12/06/2012    Insomnia     Insulin resistance     Low back pain     Low back strain 20+ years ago    Papanicolaou smear 01/2016    Retinal break     Right knee DJD     Trauma     Weight loss          PAST SURGICAL HISTORY  Past Surgical History:   Procedure Laterality Date    COLONOSCOPY  04/25/2014    EYE SURGERY Bilateral     catarac, upper and lower tubes in both eyes    LASIK Right 09/01/2012    LEEP N/A 1994    SURG   LEEP Procedure    NOSE SURGERY           FAMILY HISTORY  Family History   Problem Relation Age of Onset    Hypertension Mother     Heart disease Father     Stroke Father     Heart attack Father     Hypertension Sister     Obesity Sister     Stroke Sister     Heart attack Paternal Grandfather     Stroke Paternal Grandfather     Heart disease Other     Sudden death Other          SOCIAL HISTORY  Social History     Socioeconomic History    Marital status: Single    Number of children: 2    Years of education: 12+4.5   Tobacco Use    Smoking status: Never    Smokeless tobacco: Never   Vaping Use    Vaping status:  Never Used   Substance and Sexual Activity    Alcohol use: Yes     Comment: Only social at holidays, 1 drink.    Drug use: No    Sexual activity: Yes     Partners: Male     Birth control/protection: Post-menopausal         ALLERGIES  Ketorolac, Morphine and codeine, Aspirin, and Metformin and related      REVIEW OF SYSTEMS  Included in HPI  All systems reviewed and negative except for those discussed in HPI.      PHYSICAL EXAM    I have reviewed the triage vital signs and nursing notes.    ED Triage Vitals   Temp Pulse Resp BP SpO2   -- -- -- -- --      Temp src Heart Rate Source Patient Position BP Location FiO2 (%)   -- -- -- -- --       Physical Exam  Constitutional:       General: She is not in acute distress.     Appearance: She is well-developed.   HENT:      Head: Normocephalic and atraumatic.   Eyes:      General: No scleral icterus.     Conjunctiva/sclera: Conjunctivae normal.   Neck:      Trachea: No tracheal deviation.   Cardiovascular:      Rate and Rhythm: Regular rhythm.      Comments: Borderline tachycardia  Pulmonary:      Effort: Pulmonary effort is normal.      Breath sounds: Wheezing present.   Abdominal:      Palpations: Abdomen is soft.      Tenderness: There is no abdominal tenderness.   Musculoskeletal:         General: No deformity.      Cervical back: Normal range of motion.   Lymphadenopathy:      Cervical: No cervical adenopathy.   Skin:     General: Skin is warm and dry.   Neurological:      Mental Status: She is alert and oriented to person, place, and time.   Psychiatric:         Behavior: Behavior normal.         Vital signs and nursing notes reviewed.      PPE: I wore a surgical mask throughout my encounters with the pt. I performed hand hygiene on entry into the pt room and upon exit.     LAB RESULTS  Recent Results (from the past 24 hour(s))   CBC (No Diff)    Collection Time: 07/19/24  3:00 PM    Specimen: Blood   Result Value Ref Range    WBC 5.75 3.40 - 10.80 10*3/mm3    RBC 4.97  3.77 - 5.28 10*6/mm3    Hemoglobin 14.1 12.0 - 15.9 g/dL    Hematocrit 43.5 34.0 - 46.6 %    MCV 87.5 79.0 - 97.0 fL    MCH 28.4 26.6 - 33.0 pg    MCHC 32.4 31.5 - 35.7 g/dL    RDW 13.0 12.3 - 15.4 %    RDW-SD 40.8 37.0 - 54.0 fl    MPV 10.7 6.0 - 12.0 fL    Platelets 237 140 - 450 10*3/mm3   Lactic Acid, Plasma    Collection Time: 07/19/24  4:30 PM    Specimen: Blood   Result Value Ref Range    Lactate 2.1 (C) 0.5 - 2.0 mmol/L   Green Top (Gel)    Collection Time: 07/19/24  4:42 PM   Result Value Ref Range    Extra Tube Hold for add-ons.    Comprehensive Metabolic Panel    Collection Time: 07/19/24  4:42 PM    Specimen: Blood   Result Value Ref Range    Glucose 135 (H) 65 - 99 mg/dL    BUN 9 8 - 23 mg/dL    Creatinine 0.82 0.57 - 1.00 mg/dL    Sodium 140 136 - 145 mmol/L    Potassium 3.9 3.5 - 5.2 mmol/L    Chloride 101 98 - 107 mmol/L    CO2 27.0 22.0 - 29.0 mmol/L    Calcium 9.4 8.6 - 10.5 mg/dL    Total Protein 8.4 6.0 - 8.5 g/dL    Albumin 4.4 3.5 - 5.2 g/dL    ALT (SGPT) 16 1 - 33 U/L    AST (SGOT) 19 1 - 32 U/L    Alkaline Phosphatase 123 (H) 39 - 117 U/L    Total Bilirubin 0.3 0.0 - 1.2 mg/dL    Globulin 4.0 gm/dL    A/G Ratio 1.1 g/dL    BUN/Creatinine Ratio 11.0 7.0 - 25.0    Anion Gap 12.0 5.0 - 15.0 mmol/L    eGFR 81.0 >60.0 mL/min/1.73   High Sensitivity Troponin T    Collection Time: 07/19/24  4:42 PM    Specimen: Blood   Result Value Ref Range    HS Troponin T 7 <14 ng/L   BNP    Collection Time: 07/19/24  4:42 PM    Specimen: Blood   Result Value Ref Range    proBNP 167.0 0.0 - 900.0 pg/mL   Procalcitonin    Collection Time: 07/19/24  4:42 PM    Specimen: Blood   Result Value Ref Range    Procalcitonin 0.02 0.00 - 0.25 ng/mL   Respiratory Panel PCR w/COVID-19(SARS-CoV-2) [facility]/OLIVIER/EFRA/PAD/COR/DANILO In-House, NP Swab in UTM/VTM, 2 HR TAT - Swab, Nasopharynx    Collection Time: 07/19/24  6:07 PM    Specimen: Nasopharynx; Swab   Result Value Ref Range    ADENOVIRUS, PCR Not Detected Not Detected     Coronavirus 229E Not Detected Not Detected    Coronavirus HKU1 Not Detected Not Detected    Coronavirus NL63 Not Detected Not Detected    Coronavirus OC43 Not Detected Not Detected    COVID19 Detected (C) Not Detected - Ref. Range    Human Metapneumovirus Not Detected Not Detected    Human Rhinovirus/Enterovirus Not Detected Not Detected    Influenza A PCR Not Detected Not Detected    Influenza A H1 Not Detected Not Detected    Influenza A H1 2009 PCR Not Detected Not Detected    Influenza A H3 Not Detected Not Detected    Influenza B PCR Not Detected Not Detected    Parainfluenza Virus 1 Not Detected Not Detected    Parainfluenza Virus 2 Not Detected Not Detected    Parainfluenza Virus 3 Not Detected Not Detected    Parainfluenza Virus 4 Not Detected Not Detected    RSV, PCR Not Detected Not Detected    Bordetella pertussis pcr Not Detected Not Detected    Bordetella parapertussis PCR Not Detected Not Detected    Chlamydophila pneumoniae PCR Not Detected Not Detected    Mycoplasma pneumo by PCR Not Detected Not Detected         RADIOLOGY  XR Chest 1 View    Result Date: 7/19/2024  Lala Dejesus  1960  Portable chest  Comparison 4/24/2020  Cough, chest pain, fever, shortness of breath findings: normal chest   This report was finalized on 7/19/2024 2:55 PM by Dr. Matias Martin M.D on Workstation: [id number]         MEDICATIONS GIVEN IN ER  Medications   acetaminophen (TYLENOL) tablet 1,000 mg (1,000 mg Oral Given 7/19/24 1657)   sodium chloride 0.9 % bolus 1,000 mL (1,000 mL Intravenous New Bag 7/19/24 1728)         ORDERS PLACED DURING THIS VISIT:  Orders Placed This Encounter   Procedures    Blood Culture - Blood, Arm, Left    Blood Culture - Blood,    Respiratory Panel PCR w/COVID-19(SARS-CoV-2) [facility]IVIER/EFRA/PAD/COR/DANILO In-House, NP Swab in UTM/VTM, 2 HR TAT - Swab, Nasopharynx    XR Chest 1 View    Lactic Acid, Plasma    Ramer Draw    CBC (No Diff)    Comprehensive Metabolic Panel    High Sensitivity  Troponin T    BNP    Procalcitonin    STAT Lactic Acid, Reflex    High Sensitivity Troponin T 2Hr    Green Top (Gel)         OUTPATIENT MEDICATION MANAGEMENT:  No current Epic-ordered facility-administered medications on file.     Current Outpatient Medications Ordered in Epic   Medication Sig Dispense Refill    albuterol sulfate  (90 Base) MCG/ACT inhaler INL 2 PFS PO Q 4 H PRN      Alphagan P 0.1 % solution ophthalmic solution       benzonatate (TESSALON) 200 MG capsule Take 1 capsule by mouth 3 (Three) Times a Day As Needed for Cough. 20 capsule 0    Biotin 10 MG tablet Take  by mouth Daily.      Calcium Carb-Cholecalciferol 600-200 MG-UNIT tablet Take  by mouth.      Dapagliflozin-metFORMIN HCl (XIGDUO XR PO) Take  by mouth.      doxycycline (ADOXA) 50 MG tablet       folic acid (FOLVITE) 1 MG tablet       inFLIXimab (REMICADE IV) Infuse  into a venous catheter.      loratadine-pseudoephedrine (CLARITIN-D 12-hour) 5-120 MG per 12 hr tablet Take  by mouth.      Lotemax SM 0.38 % gel       methotrexate 2.5 MG tablet       Multiple Vitamin (MULTI VITAMIN DAILY PO) Take  by mouth.      ondansetron ODT (ZOFRAN-ODT) 4 MG disintegrating tablet Place 1 tablet on the tongue 4 (Four) Times a Day As Needed for Nausea. 20 tablet 0    Prolensa 0.07 % solution       promethazine-dextromethorphan (PROMETHAZINE-DM) 6.25-15 MG/5ML syrup Take 5 mL by mouth 4 (Four) Times a Day As Needed for Cough. 180 mL 0    Rocklatan 0.02-0.005 % solution       rosuvastatin (CRESTOR) 10 MG tablet Take 1 tablet by mouth Daily.             PROGRESS, DATA ANALYSIS, CONSULTS, AND MEDICAL DECISION MAKING  All labs have been independently interpreted by me.  All radiology studies have been reviewed by me. All EKG's have been independently viewed and interpreted by me.  Discussion below represents my analysis of pertinent findings related to patient's condition, differential diagnosis, treatment plan and final disposition.    DIFFERENTIAL  DIAGNOSIS INCLUDE BUT NOT LIMITED TO:     COVID-19, influenza, pneumonia    Clinical Scores: N/A      ED Course as of 07/19/24 1948 Fri Jul 19, 2024   1704 Patient is COVID-positive [GP]   1807 Lactate(!!): 2.1 [DC]   1807 WBC: 5.75 [DC]   1837 COVID19(!!): Detected [DC]   1941 Patient presentation and evaluation consistent with uncomplicated COVID-19 infection.  She is not hypoxic and symptoms are improving throughout ED course with symptomatic care.  She takes a statin and I do not feel that she would benefit from Paxlovid as her symptoms are mild at this time.  Patient agreeable with this plan and all questions have been answered.  Close return precautions given.  Encourage close follow-up with PCP after her quarantine period. [DC]      ED Course User Index  [DC] Adin Everett PA  [GP] Reginald Contreras MD         1945 I rechecked the patient.  I discussed the patient's labs, radiology findings (including all incidental findings), diagnosis, and plan for discharge.  A repeat exam reveals no new worrisome changes from my initial exam findings.  The patient understands that the fact that they are being discharged does not denote that nothing is abnormal, it indicates that no clinical emergency is present and that they must follow-up as directed in order to properly maintain their health.  Follow-up instructions (specifically listed below) and return to ER precautions were given at this time.  I specifically instructed the patient to follow-up with their PCP.  The patient understands and agrees with the plan, and is ready for discharge.  All questions answered.         AS OF 19:48 EDT VITALS:    BP -    HR - 95  TEMP - (!) 101 °F (38.3 °C) (Tympanic)  O2 SATS - 96%    COMPLEXITY OF CARE  Admission was considered but after careful review of the patient's presentation, physical examination, diagnostic results, and response to treatment the patient may be safely discharged with outpatient  follow-up.      DIAGNOSIS  Final diagnoses:   COVID-19   Elevated lactic acid level         DISPOSITION  ED Disposition       ED Disposition   Discharge    Condition   Stable    Comment   --                Please note that portions of this document were completed with a voice recognition program.    Note Disclaimer: At [facility], we believe that sharing information builds trust and better relationships. You are receiving this note because you recently visited [facility]. It is possible you will see health information before a provider has talked with you about it. This kind of information can be easy to misunderstand. To help you fully understand what it means for your health, we urge you to discuss this note with your provider.

## 2024-07-24 ENCOUNTER — TELEPHONE (OUTPATIENT)
Dept: ORTHOPEDIC SURGERY | Facility: CLINIC | Age: 64
End: 2024-07-24
Payer: COMMERCIAL

## 2024-07-24 DIAGNOSIS — R52 PAIN: ICD-10-CM

## 2024-07-24 DIAGNOSIS — M17.0 PRIMARY OSTEOARTHRITIS OF BOTH KNEES: Primary | ICD-10-CM

## 2024-07-24 LAB — BACTERIA SPEC AEROBE CULT: NORMAL

## 2024-07-24 NOTE — TELEPHONE ENCOUNTER
Caller: Lala Dejesus    Relationship: Self    Best call back number:     What is the best time to reach you: ANY     Who are you requesting to speak with (clinical staff, provider,  specific staff member): CLINICAL    What was the call regarding: PATIENT IS CALLING TO SEE NEXT STEPS AS FAR AS PHYSICAL THERAPY AND HER PRESCRIPTION FOR COMPOUND.  SHE HAS A PAPER PRESCRIPTION BUT SHE THOUGHT IT WOULD BE FILLED AT [facility]     Is it okay if the provider responds through MyChart: PLEASE CALL PATIENT

## 2024-07-24 NOTE — TELEPHONE ENCOUNTER
Spoke with patient and I instructed her to take the [facility] prescrition that was give to her at her last office visit on 7- to [facility] and [facility] it [facility] there. Patient would like to go to physical therapy for her knees. Referral has been placed.

## 2024-08-14 ENCOUNTER — HOSPITAL ENCOUNTER (OUTPATIENT)
Dept: PHYSICAL THERAPY | Facility: HOSPITAL | Age: 64
Discharge: HOME | End: 2024-08-14
Admitting: NURSE PRACTITIONER
Payer: COMMERCIAL

## 2024-08-14 DIAGNOSIS — M25.561 CHRONIC PAIN OF BOTH KNEES: Primary | ICD-10-CM

## 2024-08-14 DIAGNOSIS — M25.562 CHRONIC PAIN OF BOTH KNEES: Primary | ICD-10-CM

## 2024-08-14 DIAGNOSIS — G89.29 CHRONIC PAIN OF BOTH KNEES: Primary | ICD-10-CM

## 2024-08-14 PROCEDURE — 97110 THERAPEUTIC EXERCISES: CPT

## 2024-08-14 PROCEDURE — 97161 PT EVAL LOW COMPLEX 20 MIN: CPT

## 2024-08-14 NOTE — THERAPY EVALUATION
Outpatient Physical Therapy Ortho Initial Evaluation  [facility]     Patient Name: Lala Dejesus  : 1960  MRN: [id number]  Today's Date: 2024      Visit Date: 2024    Patient Active Problem List   Diagnosis    Patchy loss of hair    Metabolic syndrome with insulin resistance    Vitamin B12 deficiency    Gastroparesis with metabolic syndrome    Esophageal hiatal hernia    Stricture of esophagus    Paradoxical insomnia    Menopausal syndrome    Family history of early death (Dad at age 32)    Allergic rhinitis    Laceration of ankle with tendon involvement    Bronchitis    Cough    Acute bacterial conjunctivitis    Fall (on) (from) other stairs and steps, initial encounter    Right shoulder injury    Numbness of right hand    Hematuria    FH ischemic heart disease    Precordial pain    Weight gain    SOB (shortness of breath)    Hyperlipidemia        Past Medical History:   Diagnosis Date    Abdominal pain     Abdominal pain and intolerance to regular metformin    Abnormal Pap smear of cervix     Abnormal PAP S/P LEEP    Allergic reaction     to morphine    Allergic rhinitis     Amenorrhea     due to menopause    Ankle sprain 20+ years    Both    Breast lump     benign in past    DM (diabetes mellitus)     FH: early death     Early sudden death in family in family at age 32    Gastritis     Gastroparesis     with recurrent nausea    GERD (gastroesophageal reflux disease)     Glaucoma     both eyes, has upper and lower tubes in both eyes    H/O mammogram     2016 wnl  13    Hair loss     due to nervous twisting    Hiatal hernia     on scope     History of bone density study 10/01/2012    History of colonoscopy 2015    WNL    History of EKG 2012    Hyperlipidemia     Insomnia     Insulin resistance     with elevated glucoses trial of Janumet XR     Low back pain     Low back strain 20+ years ago    Metabolic syndrome     with high insulin level     Papanicolaou smear 01/2016    wnl    Retinal break     burned in 2 places in right eye    Right knee DJD     Stricture of esophagus     Strictures of esophagus    Trauma     to left hip, left hand and left elbow at hotel 11/18/15- no sequaela    Weight loss     Intentional weight loss at [facility]        Past Surgical History:   Procedure Laterality Date    COLONOSCOPY  04/25/2014    EYE SURGERY Bilateral     catarac, upper and lower tubes in both eyes    LASIK Right 09/01/2012    LEEP N/A 1994    SURG   LEEP Procedure    NOSE SURGERY         Visit Dx:     ICD-10-CM ICD-9-CM   1. Chronic pain of both knees  M25.561 719.46    M25.562 338.29    G89.29           Patient History       Row Name 08/14/24 1000             History    Chief Complaint Pain  -ER      Type of Pain Knee pain  bilateral  -ER      Date Current Problem(s) Began --  >50 years ago, most recently 2022  -ER      Brief Description of Current Complaint Lala Dejesus is a 64 y.o. female who presents to physical therapy today with primary compliant of chronic LBP that began >50 years ago. She notes that when she was 14 years old she fell off a horse and was dragged behind it, causing damage to the R knee. She injured her L knee in in 2022 in [place] when she was running from a homeless woman. Lala Dejesus reports difficulty/increased pain with sitting for long periods of time, walking long distances. Pain relieving factors include riding a recumbent bike to loosen her knees up, and tylenol as needed. PMH includes laceration of ankle, hx of falls, R shoulder injury, ischemic heart disease, SOB, and hyperlipidemia. Lala Dejesus primary goal for attending skilled physical therapy is to reduce pain.  -ER      Previous treatment for THIS PROBLEM Other (comment)  PT in the past for both knees  -ER      Patient/Caregiver Goals Relieve pain;Improve mobility;Improve strength  -ER      Occupation/sports/leisure activities Sedentary work, strength  training with exercise   -ER      What clinical tests have you had for this problem? X-ray  -ER      Results of Clinical Tests Significant patellofemoral osteoarthritic changes noted bilaterally as well as arthritic changes noted throughout the right knee best seen on lateral view  -ER      Related/Recent Hospitalizations No  -ER      History of Previous Related Injuries hx of falls  -ER         Pain     Pain Location Knee  -ER      Pain at Present 0;2  0 R , 2 L  -ER      Pain at Best 0  -ER      Pain at Worst 5  -ER      Pain Frequency Constant/continuous  gradual throughout day  -ER      What Performance Factors Make the Current Problem(s) WORSE? sitting for long periods  -ER      What Performance Factors Make the Current Problem(s) BETTER? tylenol as needed, RCB helpful sometimes  -ER      Tolerance Time- Sitting <2 hours  -ER      Is your sleep disturbed? Yes  -ER      Total hours of sleep per night --  waking 2-3 times a nighht due to cramping in bilateral LE's  -ER      What position do you sleep in? Supine  -ER      Difficulties at work? sitting at desk  -ER      Difficulties with ADL's? N/A  -ER      Difficulties with recreational activities? Pain limiting  -ER         Fall Risk Assessment    Any falls in the past year: No  -ER         Services    Prior Rehab/Home Health Experiences No  -ER      Are you currently receiving Home Health services No  -ER      Do you plan to receive Home Health services in the near future No  -ER         Daily Activities    Primary Language English  -ER      Are you able to read Yes  -ER      Are you able to write Yes  -ER      How does patient learn best? Listening;Reading;Demonstration  -ER      Patient is concerned about/has problems with Performing job responsibilities/community activities (work, school,;Sitting;Standing;Transfers (getting out of a chair, bed)  -ER      Does patient have problems with the following? None  -ER      Barriers to learning None  -ER       Recommended Referrals --  May benefit from Aquatic therapy  -ER      Pt Participated in POC and Goals Yes  -ER                User Key  (r) = Recorded By, (t) = Taken By, (c) = Cosigned By      Initials Name Provider Type    ER Tiffanie Martinez PT Physical Therapist                     PT Ortho       Row Name 08/14/24 1000       Posture/Observations    Alignment Options Forward head;Rounded shoulders  -ER    Forward Head Mild  -ER    Rounded Shoulders Mild  -ER    Posture/Observations Comments L knee valgus, bilateral knee hyperextension in stance  -ER       Quarter Clearing    Quarter Clearing Lower Quarter Clearing  -ER       DTR- Lower Quarter Clearing    Patellar tendon (L2-4) Bilateral:;1- Minimal response  -ER    Achilles tendon (S1-2) Bilateral:;1- Minimal response  -ER       Sensory Screen for Light Touch- Lower Quarter Clearing    L1 (inguinal area) Intact  -ER    L2 (anterior mid thigh) Intact  -ER    L3 (distal anterior thigh) Intact  -ER    L4 (medial lower leg/foot) Intact  -ER    L5 (lateral lower leg/great toe) Intact  -ER    S1 (bottom of foot) Intact  -ER       Myotomal Screen- Lower Quarter Clearing    Hip flexion (L2) Bilateral:;4 (Good)  -ER    Knee extension (L3) Bilateral:;3+ (Fair +)  -ER    Ankle DF (L4) Bilateral:;4 (Good)  -ER    Ankle PF (S1) Bilateral:;4+ (Good +)  -ER    Knee flexion (S2) Left:;3+ (Fair +);Right:;4- (Good -)  -ER       Special Tests/Palpation    Special Tests/Palpation Knee  -ER       Knee Palpation    Knee Palpation? Yes  -ER    Patella Bilateral:;Tender  -ER    Patella Tendon Right:;Tender  -ER    Medial Joint Line Left:;Tender  severely painful  -ER       Patellar Accessory Motions    Patellar Accessory Motions Tested? Yes  -ER    Superior glide Hypomobile  -ER    Inferior glide Hypomobile  -ER    Medial glide Left pain  -ER    Lateral glide Bilateral pain  -ER       General ROM    RT Lower Ext Rt Knee Extension/Flexion  -ER    LT Lower Ext Lt Knee Extension/Flexion  -ER        Left Lower Ext    Lt Knee Extension/Flexion AROM 0-105  -ER       MMT (Manual Muscle Testing)    Rt Lower Ext Rt Knee Extension;Rt Knee Flexion  -ER    Lt Lower Ext Lt Knee Extension;Lt Knee Flexion  -ER       MMT Right Lower Ext    Rt Knee Extension MMT, Gross Movement (3+/5) fair plus  -ER    Rt Knee Flexion MMT, Gross Movement (4-/5) good minus  -ER       MMT Left Lower Ext    Lt Knee Extension MMT, Gross Movement (3+/5) fair plus  -ER    Lt Knee Flexion MMT, Gross Movement (3+/5) fair plus  -ER       Sensation    Sensation WNL? WNL  -ER       Flexibility    Flexibility Tested? Lower Extremity  -ER       Lower Extremity Flexibility    Hamstrings Mildly limited  -ER    Quadriceps Mildly limited  -ER              User Key  (r) = Recorded By, (t) = Taken By, (c) = Cosigned By      Initials Name Provider Type    ER Tiffanie Martinez, PT Physical Therapist                                Therapy Education  Education Details: Educated on PT role and POC; discussed expectations/timeframe for healing. Provided HEP, Access Code:[email or web address]  Given: HEP, Symptoms/condition management  Program: New  How Provided: Verbal, Demonstration, Written  Provided to: Patient  Level of Understanding: Teach back education performed, Verbalized, Demonstrated      PT OP Goals       Row Name 08/14/24 1000          PT Short Term Goals    STG 1 Pt will be independent with initial HEP to improve strength/ROM and decrease pain.  -ER     STG 1 Progress New  -ER     STG 2 Pt will improve L knee AROM from 105 degrees to 115 degrees to improve ability to navigate stairs.  -ER     STG 2 Progress New  -ER     STG 3 Pt will improve B LE strength to at least 4/5.  -ER     STG 3 Progress New  -ER        Long Term Goals    LTG 1 Pt will be independent with advance HEP to improve strength/ROM and decrease pain.  -ER     LTG 1 Progress New  -ER     LTG 2 Pt will be able to ascend/descend stairs in a reciprocal pattern with no increase pain.  -ER     LTG 2  Progress New  -ER     LTG 3 Pt will improve L knee AROM from 105 to at least 0-120 deg to improve ability to navigate stairs.  -ER     LTG 3 Progress New  -ER     LTG 4 Pt will report </= 5/10 pain with ADLs to improve quality of life  -ER     LTG 4 Progress New  -ER     LTG 5 Pt will ambulate with a reciprocal gait pattern with no AD to improve community access.  -ER     LTG 5 Progress New  -ER        Time Calculation    PT Goal Re-Cert Due Date 11/12/24  -ER               User Key  (r) = Recorded By, (t) = Taken By, (c) = Cosigned By      Initials Name Provider Type    ER Tiffanie Martinez, PT Physical Therapist                     PT Assessment/Plan       Row Name 08/14/24 1000          PT Assessment    Functional Limitations Impaired gait;Performance in work activities;Limitation in home management;Limitations in functional capacity and performance  -ER     Impairments Balance;Gait;Pain;Poor body mechanics;Posture;Range of motion;Sensation  -ER     Assessment Comments Lala Dejesus is a 64 y.o. female referred to physical therapy for evaluation of chronic bilateral knee pain. Pt. Has hx of falls which have contributed to bilateral knee pain including a fall off of a horse >50 years ago (effecting R knee), and a fall in [facility] after tripping over a brick (2022).  She presents with a stable clinical presentation, along with  comorbidities of  laceration of R ankle, hx of falls, R shoulder injury, ischemic heart disease, SOB, and hyperlipidemia. and personal factors of chronic pain that may impact her progress in the plan of care. Pt presents today TTP along medial joint line of L knee, and patellar tendon of R knee, reduced L knee flexion ROM, hypomobility of patella bilaterall in all directions, antalgic transfers and gait and + valgus L knee . Her signs and symptoms are consistent with referring diagnosis. The previous impairments limit her ability to sit for long periods of time, effect her transfers, walk  long distances, ambulate without stumbling. Patients Knee ADL outcome measure, 56/70 where 70 is no difficulty. Pt will benefit from skilled PT to address the previous impairments and return to PLOF.  -ER     Please refer to paper survey for additional self-reported information No  -ER     Rehab Potential Good  -ER     Patient/caregiver participated in establishment of treatment plan and goals Yes  -ER     Patient would benefit from skilled therapy intervention Yes  -ER        PT Plan    PT Frequency 2x/week  -ER     Predicted Duration of Therapy Intervention (PT) 10-12 visits  -ER     Planned CPT's? PT EVAL LOW COMPLEXITY: 91978;PT THER PROC EA 15 MIN: 12586;PT THER ACT EA 15 MIN: 68998;PT MANUAL THERAPY EA 15 MIN: 71319;PT NEUROMUSC RE-EDUCATION EA 15 MIN: 71576;PT GAIT TRAINING EA 15 MIN: 20896;PT AQUATIC THERAPY EA 15 MIN: 46996;PT SELF CARE/HOME MGMT/TRAIN EA 15: 98732;PT HOT OR COLD PACK TREAT [facility];PT ELECTRICAL STIM ATTD EA 15 MIN: 09679;PT SELF CARE/MGMT/TRAIN 15 MIN: 16621;PT THER PROC GROUP: 39928;PT THER MASS EA 15 MIN: 84909;PT THER SUPP EA 15 MIN  -ER     PT Plan Comments QS. QS+SLR, nustep, bridge, HL hip ADD/ABD, heel slide, seated HS stretch, gastroc stretch, bridge, mini squat, lateral banded walk, SKTC vs DKTC?  -ER               User Key  (r) = Recorded By, (t) = Taken By, (c) = Cosigned By      Initials Name Provider Type    ER Tiffanie Martinez, PT Physical Therapist                       OP Exercises       Row Name 08/14/24 1000             Subjective    Subjective Comments PT EVAL  -ER         Subjective Pain    Able to rate subjective pain? yes  -ER      Pre-Treatment Pain Level 0  -ER      Post-Treatment Pain Level 2  -ER         Total Minutes    46901 - PT Therapeutic Exercise Minutes 10  -ER         Exercise 2    Exercise Name 2 QS  -ER      Cueing 2 Verbal;Demo  -ER      Sets 2 1  -ER      Reps 2 10  -ER      Time 2 5sec  -ER         Exercise 3    Exercise Name 3 QS+SLR  -ER      Cueing 3  Verbal;Demo  -ER      Sets 3 1  -ER      Reps 3 10  -ER      Time 3 3 sec lower  -ER         Exercise 4    Exercise Name 4 LAQ  -ER      Cueing 4 Verbal;Demo  -ER      Sets 4 1  -ER      Reps 4 10e  -ER      Time 4 3 sec lower  -ER                User Key  (r) = Recorded By, (t) = Taken By, (c) = Cosigned By      Initials Name Provider Type    ER MichelleTiffanie, PT Physical Therapist                                  Outcome Measure Options: Knee Outcome Score- ADL  Knee Outcome Survey Activities of Daily Living Scale  Symptoms: Pain: The symptom affects my activity slightly  Symptoms: Stiffness: I have the symptom, but it does not affect my activity  Symptoms: Swelling: I have the symptom, but it does not affect my activity  Symptoms: Giving way, buckling, or shifting of the knee: The symptom affects my activity slightly  Symptoms: Weakness: I have the symptom, but it does not affect my activity  Symptoms: Limping: The symptom affects my activity slightly  Functional Limitations with ADL's: Walk: Activity is not difficult  Functional Limitations with ADL's: Go up stairs: Activity is minimally difficult  Functional Limitations with ADL's: Go down stairs: Activity is not difficult  Functional Limitations with ADL's: Stand: Activity is not difficult  Functional Limitations with ADL's: Kneel on front of your knee: Activity is minimally difficult  Functional Limitations with ADL's: Squat: Activity is not difficult  Functional Limitations with ADL's: Sit with your knee bent: Activity is somewhat difficult  Functional Limitations with ADL's: Rise from a chair: Activity is minimally difficult  Knee Outcome Summary ADL's Score: 80 %  Knee Outcome Score Comments: 56/70      Time Calculation:     Start Time: 1015  Stop Time: 1100  Time Calculation (min): 45 min  Total Timed Code Minutes- PT: 10 minute(s)  Timed Charges  39746 - PT Therapeutic Exercise Minutes: 10  Untimed Charges  PT Eval/Re-eval Minutes: 35  Total Minutes  Timed  Charges Total Minutes: 10  Untimed Charges Total Minutes: 35   Total Minutes: 45     Therapy Charges for Today       Code Description Service Date Service Provider Modifiers Qty    59850226292 HC PT THER PROC EA 15 MIN 8/14/2024 Tiffanie Martinez, PT GP 1    50127467729 HC PT EVAL LOW COMPLEXITY 2 8/14/2024 Tiffanie Martinez, PT GP 1            PT G-Codes  Outcome Measure Options: Knee Outcome Score- ADL         Tiffanie Martinez, PT  8/14/2024

## 2024-08-23 ENCOUNTER — HOSPITAL ENCOUNTER (OUTPATIENT)
Dept: PHYSICAL THERAPY | Facility: HOSPITAL | Age: 64
Setting detail: THERAPIES SERIES
Discharge: HOME | End: 2024-08-23
Payer: COMMERCIAL

## 2024-08-23 DIAGNOSIS — M25.561 CHRONIC PAIN OF BOTH KNEES: Primary | ICD-10-CM

## 2024-08-23 DIAGNOSIS — M25.562 CHRONIC PAIN OF BOTH KNEES: Primary | ICD-10-CM

## 2024-08-23 DIAGNOSIS — G89.29 CHRONIC PAIN OF BOTH KNEES: Primary | ICD-10-CM

## 2024-08-23 PROCEDURE — 97110 THERAPEUTIC EXERCISES: CPT | Performed by: PHYSICAL THERAPIST

## 2024-08-23 NOTE — THERAPY TREATMENT NOTE
Outpatient Physical Therapy Ortho Treatment Note  [facility]     Patient Name: Lala Dejesus  : 1960  MRN: [id number]  Today's Date: 2024      Visit Date: 2024    Visit Dx:    ICD-10-CM ICD-9-CM   1. Chronic pain of both knees  M25.561 719.46    M25.562 338.29    G89.29        Patient Active Problem List   Diagnosis    Patchy loss of hair    Metabolic syndrome with insulin resistance    Vitamin B12 deficiency    Gastroparesis with metabolic syndrome    Esophageal hiatal hernia    Stricture of esophagus    Paradoxical insomnia    Menopausal syndrome    Family history of early death (Dad at age 32)    Allergic rhinitis    Laceration of ankle with tendon involvement    Bronchitis    Cough    Acute bacterial conjunctivitis    Fall (on) (from) other stairs and steps, initial encounter    Right shoulder injury    Numbness of right hand    Hematuria    FH ischemic heart disease    Precordial pain    Weight gain    SOB (shortness of breath)    Hyperlipidemia        Past Medical History:   Diagnosis Date    Abdominal pain     Abdominal pain and intolerance to regular metformin    Abnormal Pap smear of cervix     Abnormal PAP S/P LEEP    Allergic reaction     to morphine    Allergic rhinitis     Amenorrhea     due to menopause    Ankle sprain 20+ years    Both    Breast lump     benign in past    DM (diabetes mellitus)     FH: early death     Early sudden death in family in family at age 32    Gastritis     Gastroparesis     with recurrent nausea    GERD (gastroesophageal reflux disease)     Glaucoma     both eyes, has upper and lower tubes in both eyes    H/O mammogram     2016 wnl  13    Hair loss     due to nervous twisting    Hiatal hernia     on scope     History of bone density study 10/01/2012    History of colonoscopy 2015    WNL    History of EKG 2012    Hyperlipidemia     Insomnia     Insulin resistance     with elevated glucoses trial of Janumet XR  50/1000    Low back pain     Low back strain 20+ years ago    Metabolic syndrome     with high insulin level    Papanicolaou smear 01/2016    wnl    Retinal break     burned in 2 places in right eye    Right knee DJD     Stricture of esophagus     Strictures of esophagus    Trauma     to left hip, left hand and left elbow at hotel 11/18/15- no sequaela    Weight loss     Intentional weight loss at [facility]        Past Surgical History:   Procedure Laterality Date    COLONOSCOPY  04/25/2014    EYE SURGERY Bilateral     catarac, upper and lower tubes in both eyes    LASIK Right 09/01/2012    LEEP N/A 1994    SURG   LEEP Procedure    NOSE SURGERY                          PT Assessment/Plan       Row Name 08/23/24 0853          PT Assessment    Assessment Comments Ms. Dejesus returns to the clinic for her first PT session for her chronic Bilateral knee pain.  PmHx of RA.  Reviewed current HEP, and progressed LE strengthening. Updated HEP.  Strengthening to be once every other day. Reviewed anatomy of the knee and physiology of healing, including realistic time frames/outcomes with recovery process.  Ms. Dejesus continues to be a good candidate for skilled physical therapy.  -GJ        PT Plan    PT Plan Comments assess respone to new exercises, add lateral stepping, step up, DKTC on swiss ball  -               User Key  (r) = Recorded By, (t) = Taken By, (c) = Cosigned By      Initials Name Provider Type    Michele Boykin, PT Physical Therapist                       OP Exercises       Row Name 08/23/24 0833 08/23/24 0800          Subjective    Subjective Comments -- i jsut had my infusin (for RA).  My knees are hurting  -GJ        Total Minutes    70888 - PT Therapeutic Exercise Minutes 40  -GJ --        Exercise 1    Exercise Name 1 -- nustep  -GJ     Time 1 -- 5 min  -GJ        Exercise 3    Exercise Name 3 -- QS+SLR  -GJ     Cueing 3 -- Verbal;Demo  -GJ     Sets 3 -- 1  -GJ     Reps 3 -- 15  -GJ        Exercise  4    Exercise Name 4 -- LAQ  -GJ     Cueing 4 -- Verbal;Demo  -GJ     Sets 4 -- 2  -GJ     Reps 4 -- 10  -GJ     Time 4 -- 2#, B  -GJ        Exercise 5    Exercise Name 5 -- HR  -GJ     Cueing 5 -- Verbal;Demo  -GJ     Reps 5 -- 20  -GJ        Exercise 6    Exercise Name 6 -- HL hip abd  -GJ     Cueing 6 -- Verbal;Demo  -GJ     Reps 6 -- 15  -GJ     Time 6 -- 3s  -GJ     Additional Comments -- GTB  -GJ        Exercise 7    Exercise Name 7 -- HL hip add  -GJ     Cueing 7 -- Verbal;Demo  -GJ     Reps 7 -- 15  -GJ     Time 7 -- 3s  -GJ     Additional Comments -- ball  -GJ        Exercise 8    Exercise Name 8 -- bridge  -GJ     Cueing 8 -- Verbal;Demo  -GJ     Reps 8 -- 15  -GJ     Time 8 -- 3s  -GJ        Exercise 9    Exercise Name 9 -- seated HS stretch  -GJ     Cueing 9 -- Verbal;Demo  -GJ     Reps 9 -- 3  -GJ     Time 9 -- 20s  -GJ        Exercise 10    Exercise Name 10 -- gastoc stretch, from step  -GJ     Cueing 10 -- Verbal;Demo  -GJ     Reps 10 -- 3  -GJ     Time 10 -- 20s  -GJ     Additional Comments -- B  -GJ               User Key  (r) = Recorded By, (t) = Taken By, (c) = Cosigned By      Initials Name Provider Type    Michele Boykin, PT Physical Therapist                                  PT OP Goals       Row Name 08/23/24 0800          PT Short Term Goals    STG 1 Pt will be independent with initial HEP to improve strength/ROM and decrease pain.  -GJ     STG 1 Progress Ongoing  -GJ     STG 1 Progress Comments reviewed  -GJ     STG 2 Pt will improve L knee AROM from 105 degrees to 115 degrees to improve ability to navigate stairs.  -GJ     STG 2 Progress Ongoing  -GJ     STG 3 Pt will improve B LE strength to at least 4/5.  -GJ     STG 3 Progress Ongoing  -GJ        Long Term Goals    LTG 1 Pt will be independent with advance HEP to improve strength/ROM and decrease pain.  -GJ     LTG 1 Progress Ongoing  -GJ     LTG 2 Pt will be able to ascend/descend stairs in a reciprocal pattern with no increase  pain.  -GJ     LTG 2 Progress Ongoing  -GJ     LTG 3 Pt will improve L knee AROM from 105 to at least 0-120 deg to improve ability to navigate stairs.  -GJ     LTG 3 Progress Ongoing  -GJ     LTG 4 Pt will report </= 5/10 pain with ADLs to improve quality of life  -GJ     LTG 4 Progress Ongoing  -GJ     LTG 5 Pt will ambulate with a reciprocal gait pattern with no AD to improve community access.  -GJ     LTG 5 Progress Ongoing  -GJ               User Key  (r) = Recorded By, (t) = Taken By, (c) = Cosigned By      Initials Name Provider Type    Michele Boykin, PT Physical Therapist                    Therapy Education  Education Details: [id number], reviewed activity modifications, reveiewd anatomy of the knee and physiology of healing, discussed realistic time frames/expectations with recovery  Given: HEP, Symptoms/condition management, Pain management, Posture/body mechanics, Fall prevention and home safety, Mobility training  Program: Reinforced, New, Progressed  How Provided: Verbal, Demonstration, Written  Provided to: Patient  Level of Understanding: Teach back education performed, Verbalized, Demonstrated              Time Calculation:   Start Time: 0833  Stop Time: 0915  Time Calculation (min): 42 min  Timed Charges  96988 - PT Therapeutic Exercise Minutes: 40  Total Minutes  Timed Charges Total Minutes: 40   Total Minutes: 40  Therapy Charges for Today       Code Description Service Date Service Provider Modifiers Qty    [id number]  PT THER PROC EA 15 MIN 8/23/2024 Michele Cerda, PT GP 3                      Michele Cerda, PT  8/23/2024

## 2024-08-30 ENCOUNTER — HOSPITAL ENCOUNTER (OUTPATIENT)
Dept: PHYSICAL THERAPY | Facility: HOSPITAL | Age: 64
Setting detail: THERAPIES SERIES
Discharge: HOME | End: 2024-08-30
Payer: COMMERCIAL

## 2024-08-30 DIAGNOSIS — M25.561 CHRONIC PAIN OF BOTH KNEES: Primary | ICD-10-CM

## 2024-08-30 DIAGNOSIS — M25.562 CHRONIC PAIN OF BOTH KNEES: Primary | ICD-10-CM

## 2024-08-30 DIAGNOSIS — G89.29 CHRONIC PAIN OF BOTH KNEES: Primary | ICD-10-CM

## 2024-08-30 PROCEDURE — 97110 THERAPEUTIC EXERCISES: CPT

## 2024-08-30 PROCEDURE — 97530 THERAPEUTIC ACTIVITIES: CPT
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
## 2024-09-17 ENCOUNTER — OFFICE VISIT (OUTPATIENT)
Dept: CARDIOLOGY | Facility: CLINIC | Age: 64
End: 2024-09-17
Payer: COMMERCIAL

## 2024-09-17 VITALS
BODY MASS INDEX: 35 KG/M2 | WEIGHT: 205 LBS | DIASTOLIC BLOOD PRESSURE: 98 MMHG | HEART RATE: 79 BPM | HEIGHT: 64 IN | SYSTOLIC BLOOD PRESSURE: 120 MMHG

## 2024-09-17 DIAGNOSIS — E78.00 PURE HYPERCHOLESTEROLEMIA: Primary | ICD-10-CM

## 2024-09-17 DIAGNOSIS — I44.7 LBBB (LEFT BUNDLE BRANCH BLOCK): ICD-10-CM

## 2024-09-17 PROCEDURE — 99213 OFFICE O/P EST LOW 20 MIN: CPT

## 2024-09-17 PROCEDURE — 93000 ELECTROCARDIOGRAM COMPLETE: CPT

## 2024-09-17 RX ORDER — DIFLUPREDNATE OPHTHALMIC 0.5 MG/ML
EMULSION OPHTHALMIC
COMMUNITY
Start: 2024-09-11

## 2024-09-17 RX ORDER — ATROPINE SULFATE 10 MG/ML
SOLUTION/ DROPS OPHTHALMIC
COMMUNITY
Start: 2024-08-06

## 2024-09-19 ENCOUNTER — TELEPHONE (OUTPATIENT)
Dept: ORTHOPEDIC SURGERY | Facility: CLINIC | Age: 64
End: 2024-09-19
Payer: COMMERCIAL

## 2024-10-25 ENCOUNTER — HOSPITAL ENCOUNTER (OUTPATIENT)
Dept: MAMMOGRAPHY | Facility: HOSPITAL | Age: 64
Discharge: HOME | End: 2024-10-25
Payer: COMMERCIAL

## 2024-11-05 ENCOUNTER — HOSPITAL ENCOUNTER (OUTPATIENT)
Dept: MAMMOGRAPHY | Facility: HOSPITAL | Age: 64
Discharge: HOME | End: 2024-11-05
Payer: COMMERCIAL

## 2024-11-26 ENCOUNTER — OFFICE VISIT (OUTPATIENT)
Dept: ORTHOPEDIC SURGERY | Facility: CLINIC | Age: 64
End: 2024-11-26
Payer: COMMERCIAL

## 2024-11-26 VITALS — WEIGHT: 208 LBS | TEMPERATURE: 98.2 F | HEIGHT: 65 IN | BODY MASS INDEX: 34.66 KG/M2

## 2024-11-26 DIAGNOSIS — M17.0 PRIMARY OSTEOARTHRITIS OF BOTH KNEES: Primary | ICD-10-CM

## 2024-11-26 PROCEDURE — 99212 OFFICE O/P EST SF 10 MIN: CPT | Performed by: ORTHOPAEDIC SURGERY

## 2024-11-26 RX ORDER — DAPAGLIFLOZIN AND METFORMIN HYDROCHLORIDE 10; 1000 MG/1; MG/1
TABLET, FILM COATED, EXTENDED RELEASE ORAL
COMMUNITY
Start: 2024-10-28

## 2024-11-26 NOTE — PROGRESS NOTES
Patient: Lala Dejesus  YOB: 1960 64 y.o. female  Medical Record Number: [id number]    Chief Complaints:   Chief Complaint   Patient presents with    Left Knee - Follow-up, Pain    Right Knee - Follow-up       History of Present Illness:Lala Dejesus is a 64 y.o. female who presents for follow-up of anterior knee discomfort is worse with stairs squatting getting out of a chair she does better level ground walking without much discomfort.    Allergies:   Allergies   Allergen Reactions    Ketorolac Anaphylaxis    Morphine And Codeine Anaphylaxis    Aspirin Nausea And Vomiting    Metformin And Related Other (See Comments)     Reaction: severe stomach cramp       Medications:   Current Outpatient Medications   Medication Sig Dispense Refill    albuterol sulfate  (90 Base) MCG/ACT inhaler INL 2 PFS PO Q 4 H PRN      Alphagan P 0.1 % solution ophthalmic solution       atropine 1 % ophthalmic solution       Biotin 10 MG tablet Take  by mouth Daily.      Calcium Carb-Cholecalciferol 600-200 MG-UNIT tablet Take  by mouth.      Dapagliflozin-metFORMIN HCl (XIGDUO XR PO) Take  by mouth.      difluprednate (DUREZOL) 0.05 % ophthalmic emulsion       doxycycline (ADOXA) 50 MG tablet Take 20 mg by mouth 2 (Two) Times a Day.      folic acid (FOLVITE) 1 MG tablet       inFLIXimab (REMICADE IV) Infuse  into a venous catheter.      loratadine-pseudoephedrine (CLARITIN-D 12-hour) 5-120 MG per 12 hr tablet Take  by mouth.      methotrexate 2.5 MG tablet       Multiple Vitamin (MULTI VITAMIN DAILY PO) Take  by mouth.      Prolensa 0.07 % solution       Rocklatan 0.02-0.005 % solution       rosuvastatin (CRESTOR) 10 MG tablet Take 1 tablet by mouth Daily.      Xigduo XR  MG tablet       benzonatate (TESSALON) 200 MG capsule Take 1 capsule by mouth 3 (Three) Times a Day As Needed for Cough. (Patient not taking: Reported on 11/26/2024) 20 capsule 0    ondansetron ODT (ZOFRAN-ODT) 4 MG disintegrating tablet  "Place 1 tablet on the tongue 4 (Four) Times a Day As Needed for Nausea. (Patient not taking: Reported on 11/26/2024) 20 tablet 0    promethazine-dextromethorphan (PROMETHAZINE-DM) 6.25-15 MG/5ML syrup Take 5 mL by mouth 4 (Four) Times a Day As Needed for Cough. (Patient not taking: Reported on 11/26/2024) 180 mL 0     No current facility-administered medications for this visit.         The following portions of the patient's history were reviewed and updated as appropriate: allergies, current medications, past family history, past medical history, past social history, past surgical history and problem list.    Review of Systems:   Pertinent positives/negative listed in HPI above    Physical Exam:   Vitals:    11/26/24 1500   Temp: 98.2 °F (36.8 °C)   TempSrc: Temporal   Weight: 94.3 kg (208 lb)   Height: 163.8 cm (64.5\")   PainSc:   5   PainLoc: Knee       General: A and O x 3, ASA, NAD   There is mild anterior crepitus with knee range of motion no joint effusion no instability overall good range of motion good quad strength calves are soft she is intact to light touch with palpable distal pulses       Radiology:  Xrays 3views bilateral knees (ap,lateral, sunrise) taken previously demonstrating moderate to moderately severe changes in the patellofemoral joint consistent with osteoarthritis      Assessment/Plan:  L > R knee PF OA- moderate in nature -  recommended PT, injections PRN, I dont recommend surgery at this point.      There are no diagnoses linked to this encounter.    Konrad Mendez MD  11/26/2024  "

## 2025-02-05 ENCOUNTER — APPOINTMENT (OUTPATIENT)
Dept: GENERAL RADIOLOGY | Facility: HOSPITAL | Age: 65
End: 2025-02-05
Payer: COMMERCIAL

## 2025-02-05 ENCOUNTER — HOSPITAL ENCOUNTER (OUTPATIENT)
Facility: HOSPITAL | Age: 65
Setting detail: OBSERVATION
Discharge: HOME HEALTH | End: 2025-02-12
Attending: EMERGENCY MEDICINE | Admitting: INTERNAL MEDICINE
Payer: COMMERCIAL

## 2025-02-05 ENCOUNTER — HOSPITAL ENCOUNTER (OUTPATIENT)
Facility: HOSPITAL | Age: 65
Discharge: HOME | End: 2025-02-05
Admitting: INTERNAL MEDICINE
Payer: COMMERCIAL

## 2025-02-05 ENCOUNTER — APPOINTMENT (OUTPATIENT)
Dept: CT IMAGING | Facility: HOSPITAL | Age: 65
End: 2025-02-05
Payer: COMMERCIAL

## 2025-02-05 DIAGNOSIS — M79.604 RIGHT LEG PAIN: ICD-10-CM

## 2025-02-05 DIAGNOSIS — I47.10 SUPRAVENTRICULAR TACHYCARDIA, UNSPECIFIED: ICD-10-CM

## 2025-02-05 DIAGNOSIS — I26.99 BILATERAL PULMONARY EMBOLISM: Primary | ICD-10-CM

## 2025-02-05 DIAGNOSIS — E11.65 HYPERGLYCEMIA DUE TO DIABETES MELLITUS: ICD-10-CM

## 2025-02-05 DIAGNOSIS — M79.601 RIGHT ARM PAIN: ICD-10-CM

## 2025-02-05 DIAGNOSIS — Z13.820 SCREENING FOR OSTEOPOROSIS: ICD-10-CM

## 2025-02-05 DIAGNOSIS — Z78.0 POSTMENOPAUSAL: ICD-10-CM

## 2025-02-05 DIAGNOSIS — E88.810 METABOLIC SYNDROME: ICD-10-CM

## 2025-02-05 LAB
ALBUMIN SERPL-MCNC: 3.9 G/DL (ref 3.5–5.2)
ALBUMIN/GLOB SERPL: 0.9 G/DL
ALP SERPL-CCNC: 131 U/L (ref 39–117)
ALT SERPL W P-5'-P-CCNC: 12 U/L (ref 1–33)
ANION GAP SERPL CALCULATED.3IONS-SCNC: 9.6 MMOL/L (ref 5–15)
APTT PPP: 30.7 SECONDS (ref 22.7–35.4)
AST SERPL-CCNC: 13 U/L (ref 1–32)
BASOPHILS # BLD AUTO: 0.02 10*3/MM3 (ref 0–0.2)
BASOPHILS NFR BLD AUTO: 0.2 % (ref 0–1.5)
BILIRUB SERPL-MCNC: 0.5 MG/DL (ref 0–1.2)
BUN SERPL-MCNC: 12 MG/DL (ref 8–23)
BUN/CREAT SERPL: 16.4 (ref 7–25)
CALCIUM SPEC-SCNC: 9 MG/DL (ref 8.6–10.5)
CHLORIDE SERPL-SCNC: 104 MMOL/L (ref 98–107)
CO2 SERPL-SCNC: 27.4 MMOL/L (ref 22–29)
CREAT SERPL-MCNC: 0.73 MG/DL (ref 0.57–1)
D DIMER PPP FEU-MCNC: 2.86 MCGFEU/ML (ref 0–0.62)
DEPRECATED RDW RBC AUTO: 38.3 FL (ref 37–54)
EGFRCR SERPLBLD CKD-EPI 2021: 93.1 ML/MIN/1.73
EOSINOPHIL # BLD AUTO: 0.08 10*3/MM3 (ref 0–0.4)
EOSINOPHIL NFR BLD AUTO: 0.8 % (ref 0.3–6.2)
ERYTHROCYTE [DISTWIDTH] IN BLOOD BY AUTOMATED COUNT: 12.3 % (ref 12.3–15.4)
GEN 5 1HR TROPONIN T REFLEX: <6 NG/L
GLOBULIN UR ELPH-MCNC: 4.4 GM/DL
GLUCOSE SERPL-MCNC: 156 MG/DL (ref 65–99)
HCT VFR BLD AUTO: 38.8 % (ref 34–46.6)
HGB BLD-MCNC: 13.1 G/DL (ref 12–15.9)
HOLD SPECIMEN: NORMAL
HOLD SPECIMEN: NORMAL
IMM GRANULOCYTES # BLD AUTO: 0.03 10*3/MM3 (ref 0–0.05)
IMM GRANULOCYTES NFR BLD AUTO: 0.3 % (ref 0–0.5)
INR PPP: 1.1 (ref 0.9–1.1)
LYMPHOCYTES # BLD AUTO: 3.79 10*3/MM3 (ref 0.7–3.1)
LYMPHOCYTES NFR BLD AUTO: 36.8 % (ref 19.6–45.3)
MCH RBC QN AUTO: 29.4 PG (ref 26.6–33)
MCHC RBC AUTO-ENTMCNC: 33.8 G/DL (ref 31.5–35.7)
MCV RBC AUTO: 87 FL (ref 79–97)
MONOCYTES # BLD AUTO: 1.14 10*3/MM3 (ref 0.1–0.9)
MONOCYTES NFR BLD AUTO: 11.1 % (ref 5–12)
NEUTROPHILS NFR BLD AUTO: 5.23 10*3/MM3 (ref 1.7–7)
NEUTROPHILS NFR BLD AUTO: 50.8 % (ref 42.7–76)
NRBC BLD AUTO-RTO: 0 /100 WBC (ref 0–0.2)
PLATELET # BLD AUTO: 209 10*3/MM3 (ref 140–450)
PMV BLD AUTO: 10.1 FL (ref 6–12)
POTASSIUM SERPL-SCNC: 3.9 MMOL/L (ref 3.5–5.2)
PROT SERPL-MCNC: 8.3 G/DL (ref 6–8.5)
PROTHROMBIN TIME: 14.2 SECONDS (ref 11.7–14.2)
RBC # BLD AUTO: 4.46 10*6/MM3 (ref 3.77–5.28)
SODIUM SERPL-SCNC: 141 MMOL/L (ref 136–145)
TROPONIN T NUMERIC DELTA: NORMAL
TROPONIN T SERPL HS-MCNC: 6 NG/L
WBC NRBC COR # BLD AUTO: 10.29 10*3/MM3 (ref 3.4–10.8)
WHOLE BLOOD HOLD COAG: NORMAL
WHOLE BLOOD HOLD SPECIMEN: NORMAL

## 2025-02-05 PROCEDURE — 80053 COMPREHEN METABOLIC PANEL: CPT | Performed by: EMERGENCY MEDICINE

## 2025-02-05 PROCEDURE — 99285 EMERGENCY DEPT VISIT HI MDM: CPT

## 2025-02-05 PROCEDURE — 93010 ELECTROCARDIOGRAM REPORT: CPT | Performed by: INTERNAL MEDICINE

## 2025-02-05 PROCEDURE — 36415 COLL VENOUS BLD VENIPUNCTURE: CPT

## 2025-02-05 PROCEDURE — 71275 CT ANGIOGRAPHY CHEST: CPT

## 2025-02-05 PROCEDURE — 85379 FIBRIN DEGRADATION QUANT: CPT | Performed by: EMERGENCY MEDICINE

## 2025-02-05 PROCEDURE — 99291 CRITICAL CARE FIRST HOUR: CPT

## 2025-02-05 PROCEDURE — 84484 ASSAY OF TROPONIN QUANT: CPT | Performed by: EMERGENCY MEDICINE

## 2025-02-05 PROCEDURE — 25010000002 HEPARIN (PORCINE) 25000-0.45 UT/250ML-% SOLUTION: Performed by: EMERGENCY MEDICINE

## 2025-02-05 PROCEDURE — G0378 HOSPITAL OBSERVATION PER HR: HCPCS

## 2025-02-05 PROCEDURE — 85610 PROTHROMBIN TIME: CPT | Performed by: EMERGENCY MEDICINE

## 2025-02-05 PROCEDURE — 71045 X-RAY EXAM CHEST 1 VIEW: CPT

## 2025-02-05 PROCEDURE — 25510000001 IOPAMIDOL PER 1 ML: Performed by: EMERGENCY MEDICINE

## 2025-02-05 PROCEDURE — 85025 COMPLETE CBC W/AUTO DIFF WBC: CPT | Performed by: EMERGENCY MEDICINE

## 2025-02-05 PROCEDURE — 77080 DXA BONE DENSITY AXIAL: CPT

## 2025-02-05 PROCEDURE — 96365 THER/PROPH/DIAG IV INF INIT: CPT

## 2025-02-05 PROCEDURE — 93005 ELECTROCARDIOGRAM TRACING: CPT | Performed by: EMERGENCY MEDICINE

## 2025-02-05 PROCEDURE — 85730 THROMBOPLASTIN TIME PARTIAL: CPT | Performed by: EMERGENCY MEDICINE

## 2025-02-05 RX ORDER — HEPARIN SODIUM 10000 [USP'U]/100ML
15.9 INJECTION, SOLUTION INTRAVENOUS
Status: DISCONTINUED | OUTPATIENT
Start: 2025-02-05 | End: 2025-02-10

## 2025-02-05 RX ORDER — IOPAMIDOL 755 MG/ML
100 INJECTION, SOLUTION INTRAVASCULAR
Status: COMPLETED | OUTPATIENT
Start: 2025-02-05 | End: 2025-02-05

## 2025-02-05 RX ORDER — SODIUM CHLORIDE 0.9 % (FLUSH) 0.9 %
10 SYRINGE (ML) INJECTION AS NEEDED
Status: DISCONTINUED | OUTPATIENT
Start: 2025-02-05 | End: 2025-02-12 | Stop reason: HOSPADM

## 2025-02-05 RX ADMIN — IOPAMIDOL 78 ML: 755 INJECTION, SOLUTION INTRAVENOUS at 22:26

## 2025-02-05 RX ADMIN — HEPARIN SODIUM 15.9 UNITS/KG/HR: 10000 INJECTION, SOLUTION INTRAVENOUS at 23:58

## 2025-02-05 NOTE — Clinical Note
Level of Care: Telemetry [5]   Diagnosis: Bilateral pulmonary embolism [669757]   Admitting Physician: MIRANDA QUINTERO [[id number]]   Attending Physician: MIRANDA QUINTERO [[id number]]

## 2025-02-06 ENCOUNTER — APPOINTMENT (OUTPATIENT)
Dept: CARDIOLOGY | Facility: HOSPITAL | Age: 65
End: 2025-02-06
Payer: COMMERCIAL

## 2025-02-06 PROBLEM — M35.9 AUTOIMMUNE DISEASE: Status: ACTIVE | Noted: 2025-02-06

## 2025-02-06 PROBLEM — I82.411 ACUTE DEEP VEIN THROMBOSIS (DVT) OF FEMORAL VEIN OF RIGHT LOWER EXTREMITY: Status: ACTIVE | Noted: 2025-02-06

## 2025-02-06 PROBLEM — E11.65 HYPERGLYCEMIA DUE TO DIABETES MELLITUS: Status: ACTIVE | Noted: 2025-02-06

## 2025-02-06 PROBLEM — F41.1 GAD (GENERALIZED ANXIETY DISORDER): Status: ACTIVE | Noted: 2025-02-06

## 2025-02-06 LAB
ALBUMIN SERPL-MCNC: 3.1 G/DL (ref 3.5–5.2)
ALBUMIN/GLOB SERPL: 0.7 G/DL
ALP SERPL-CCNC: 114 U/L (ref 39–117)
ALT SERPL W P-5'-P-CCNC: 10 U/L (ref 1–33)
ANION GAP SERPL CALCULATED.3IONS-SCNC: 7.9 MMOL/L (ref 5–15)
ANION GAP SERPL CALCULATED.3IONS-SCNC: 8.6 MMOL/L (ref 5–15)
APTT PPP: 70.9 SECONDS (ref 22.7–35.4)
AST SERPL-CCNC: 12 U/L (ref 1–32)
BASOPHILS # BLD AUTO: 0.02 10*3/MM3 (ref 0–0.2)
BASOPHILS # BLD AUTO: 0.02 10*3/MM3 (ref 0–0.2)
BASOPHILS NFR BLD AUTO: 0.2 % (ref 0–1.5)
BASOPHILS NFR BLD AUTO: 0.2 % (ref 0–1.5)
BH CV LOW VAS RIGHT DISTAL FEMORAL SPONT: 1
BH CV LOW VAS RIGHT GASTRONEMIUS VESSEL: 1
BH CV LOW VAS RIGHT MID FEMORAL SPONT: 1
BH CV LOW VAS RIGHT POPLITEAL SPONT: 1
BH CV LOW VAS RIGHT PROXIMAL FEMORAL SPONT: 1
BH CV LOWER VASCULAR LEFT COMMON FEMORAL AUGMENT: NORMAL
BH CV LOWER VASCULAR LEFT COMMON FEMORAL COMPETENT: NORMAL
BH CV LOWER VASCULAR LEFT COMMON FEMORAL COMPRESS: NORMAL
BH CV LOWER VASCULAR LEFT COMMON FEMORAL PHASIC: NORMAL
BH CV LOWER VASCULAR LEFT COMMON FEMORAL SPONT: NORMAL
BH CV LOWER VASCULAR RIGHT COMMON FEMORAL AUGMENT: NORMAL
BH CV LOWER VASCULAR RIGHT COMMON FEMORAL COMPETENT: NORMAL
BH CV LOWER VASCULAR RIGHT COMMON FEMORAL COMPRESS: NORMAL
BH CV LOWER VASCULAR RIGHT COMMON FEMORAL PHASIC: NORMAL
BH CV LOWER VASCULAR RIGHT COMMON FEMORAL SPONT: NORMAL
BH CV LOWER VASCULAR RIGHT DISTAL FEMORAL AUGMENT: NORMAL
BH CV LOWER VASCULAR RIGHT DISTAL FEMORAL COMPETENT: NORMAL
BH CV LOWER VASCULAR RIGHT DISTAL FEMORAL COMPRESS: NORMAL
BH CV LOWER VASCULAR RIGHT DISTAL FEMORAL PHASIC: NORMAL
BH CV LOWER VASCULAR RIGHT DISTAL FEMORAL SPONT: NORMAL
BH CV LOWER VASCULAR RIGHT DISTAL FEMORAL THROMBUS: NORMAL
BH CV LOWER VASCULAR RIGHT GASTRONEMIUS COMPRESS: NORMAL
BH CV LOWER VASCULAR RIGHT GASTRONEMIUS THROMBUS: NORMAL
BH CV LOWER VASCULAR RIGHT GREATER SAPH AK COMPRESS: NORMAL
BH CV LOWER VASCULAR RIGHT GREATER SAPH BK COMPRESS: NORMAL
BH CV LOWER VASCULAR RIGHT LESSER SAPH COMPRESS: NORMAL
BH CV LOWER VASCULAR RIGHT MID FEMORAL AUGMENT: NORMAL
BH CV LOWER VASCULAR RIGHT MID FEMORAL COMPETENT: NORMAL
BH CV LOWER VASCULAR RIGHT MID FEMORAL COMPRESS: NORMAL
BH CV LOWER VASCULAR RIGHT MID FEMORAL PHASIC: NORMAL
BH CV LOWER VASCULAR RIGHT MID FEMORAL SPONT: NORMAL
BH CV LOWER VASCULAR RIGHT MID FEMORAL THROMBUS: NORMAL
BH CV LOWER VASCULAR RIGHT POPLITEAL AUGMENT: NORMAL
BH CV LOWER VASCULAR RIGHT POPLITEAL COMPETENT: NORMAL
BH CV LOWER VASCULAR RIGHT POPLITEAL COMPRESS: NORMAL
BH CV LOWER VASCULAR RIGHT POPLITEAL PHASIC: NORMAL
BH CV LOWER VASCULAR RIGHT POPLITEAL SPONT: NORMAL
BH CV LOWER VASCULAR RIGHT POPLITEAL THROMBUS: NORMAL
BH CV LOWER VASCULAR RIGHT POSTERIOR TIBIAL COMPRESS: NORMAL
BH CV LOWER VASCULAR RIGHT PROXIMAL FEMORAL AUGMENT: NORMAL
BH CV LOWER VASCULAR RIGHT PROXIMAL FEMORAL COMPETENT: NORMAL
BH CV LOWER VASCULAR RIGHT PROXIMAL FEMORAL COMPRESS: NORMAL
BH CV LOWER VASCULAR RIGHT PROXIMAL FEMORAL PHASIC: NORMAL
BH CV LOWER VASCULAR RIGHT PROXIMAL FEMORAL SPONT: NORMAL
BH CV LOWER VASCULAR RIGHT PROXIMAL FEMORAL THROMBUS: NORMAL
BH CV LOWER VASCULAR RIGHT SAPHENOFEMORAL JUNCTION COMPRESS: NORMAL
BH CV VAS PRELIMINARY FINDINGS SCRIPTING: 1
BILIRUB SERPL-MCNC: 0.4 MG/DL (ref 0–1.2)
BUN SERPL-MCNC: 10 MG/DL (ref 8–23)
BUN SERPL-MCNC: 9 MG/DL (ref 8–23)
BUN/CREAT SERPL: 13.3 (ref 7–25)
BUN/CREAT SERPL: 13.4 (ref 7–25)
CALCIUM SPEC-SCNC: 8.5 MG/DL (ref 8.6–10.5)
CALCIUM SPEC-SCNC: 8.7 MG/DL (ref 8.6–10.5)
CHLORIDE SERPL-SCNC: 104 MMOL/L (ref 98–107)
CHLORIDE SERPL-SCNC: 106 MMOL/L (ref 98–107)
CHOLEST SERPL-MCNC: 177 MG/DL (ref 0–200)
CO2 SERPL-SCNC: 24.1 MMOL/L (ref 22–29)
CO2 SERPL-SCNC: 25.4 MMOL/L (ref 22–29)
CREAT SERPL-MCNC: 0.67 MG/DL (ref 0.57–1)
CREAT SERPL-MCNC: 0.75 MG/DL (ref 0.57–1)
DEPRECATED RDW RBC AUTO: 38.2 FL (ref 37–54)
DEPRECATED RDW RBC AUTO: 41.1 FL (ref 37–54)
EGFRCR SERPLBLD CKD-EPI 2021: 90.1 ML/MIN/1.73
EGFRCR SERPLBLD CKD-EPI 2021: 99 ML/MIN/1.73
EOSINOPHIL # BLD AUTO: 0.09 10*3/MM3 (ref 0–0.4)
EOSINOPHIL # BLD AUTO: 0.11 10*3/MM3 (ref 0–0.4)
EOSINOPHIL NFR BLD AUTO: 1 % (ref 0.3–6.2)
EOSINOPHIL NFR BLD AUTO: 1.2 % (ref 0.3–6.2)
ERYTHROCYTE [DISTWIDTH] IN BLOOD BY AUTOMATED COUNT: 12.2 % (ref 12.3–15.4)
ERYTHROCYTE [DISTWIDTH] IN BLOOD BY AUTOMATED COUNT: 12.8 % (ref 12.3–15.4)
GLOBULIN UR ELPH-MCNC: 4.3 GM/DL
GLUCOSE BLDC GLUCOMTR-MCNC: 201 MG/DL (ref 70–130)
GLUCOSE SERPL-MCNC: 167 MG/DL (ref 65–99)
GLUCOSE SERPL-MCNC: 226 MG/DL (ref 65–99)
HBA1C MFR BLD: 8.5 % (ref 4.8–5.6)
HCT VFR BLD AUTO: 35 % (ref 34–46.6)
HCT VFR BLD AUTO: 36.8 % (ref 34–46.6)
HDLC SERPL-MCNC: 51 MG/DL (ref 40–60)
HGB BLD-MCNC: 11.7 G/DL (ref 12–15.9)
HGB BLD-MCNC: 11.9 G/DL (ref 12–15.9)
IMM GRANULOCYTES # BLD AUTO: 0.01 10*3/MM3 (ref 0–0.05)
IMM GRANULOCYTES # BLD AUTO: 0.02 10*3/MM3 (ref 0–0.05)
IMM GRANULOCYTES NFR BLD AUTO: 0.1 % (ref 0–0.5)
IMM GRANULOCYTES NFR BLD AUTO: 0.2 % (ref 0–0.5)
LDLC SERPL CALC-MCNC: 108 MG/DL (ref 0–100)
LDLC/HDLC SERPL: 2.09 {RATIO}
LYMPHOCYTES # BLD AUTO: 3.33 10*3/MM3 (ref 0.7–3.1)
LYMPHOCYTES # BLD AUTO: 3.59 10*3/MM3 (ref 0.7–3.1)
LYMPHOCYTES NFR BLD AUTO: 35.9 % (ref 19.6–45.3)
LYMPHOCYTES NFR BLD AUTO: 37.9 % (ref 19.6–45.3)
MAGNESIUM SERPL-MCNC: 2.3 MG/DL (ref 1.6–2.4)
MCH RBC QN AUTO: 28.4 PG (ref 26.6–33)
MCH RBC QN AUTO: 29.5 PG (ref 26.6–33)
MCHC RBC AUTO-ENTMCNC: 31.8 G/DL (ref 31.5–35.7)
MCHC RBC AUTO-ENTMCNC: 34 G/DL (ref 31.5–35.7)
MCV RBC AUTO: 86.6 FL (ref 79–97)
MCV RBC AUTO: 89.3 FL (ref 79–97)
MONOCYTES # BLD AUTO: 0.89 10*3/MM3 (ref 0.1–0.9)
MONOCYTES # BLD AUTO: 0.93 10*3/MM3 (ref 0.1–0.9)
MONOCYTES NFR BLD AUTO: 10 % (ref 5–12)
MONOCYTES NFR BLD AUTO: 9.4 % (ref 5–12)
NEUTROPHILS NFR BLD AUTO: 4.84 10*3/MM3 (ref 1.7–7)
NEUTROPHILS NFR BLD AUTO: 4.9 10*3/MM3 (ref 1.7–7)
NEUTROPHILS NFR BLD AUTO: 51.1 % (ref 42.7–76)
NEUTROPHILS NFR BLD AUTO: 52.8 % (ref 42.7–76)
NRBC BLD AUTO-RTO: 0 /100 WBC (ref 0–0.2)
NRBC BLD AUTO-RTO: 0 /100 WBC (ref 0–0.2)
PLATELET # BLD AUTO: 208 10*3/MM3 (ref 140–450)
PLATELET # BLD AUTO: 216 10*3/MM3 (ref 140–450)
PMV BLD AUTO: 10.3 FL (ref 6–12)
PMV BLD AUTO: 10.3 FL (ref 6–12)
POTASSIUM SERPL-SCNC: 3.9 MMOL/L (ref 3.5–5.2)
POTASSIUM SERPL-SCNC: 3.9 MMOL/L (ref 3.5–5.2)
PROT SERPL-MCNC: 7.4 G/DL (ref 6–8.5)
QT INTERVAL: 458 MS
QTC INTERVAL: 525 MS
RBC # BLD AUTO: 4.04 10*6/MM3 (ref 3.77–5.28)
RBC # BLD AUTO: 4.12 10*6/MM3 (ref 3.77–5.28)
SODIUM SERPL-SCNC: 138 MMOL/L (ref 136–145)
SODIUM SERPL-SCNC: 138 MMOL/L (ref 136–145)
TRIGL SERPL-MCNC: 96 MG/DL (ref 0–150)
VLDLC SERPL-MCNC: 18 MG/DL (ref 5–40)
WBC NRBC COR # BLD AUTO: 9.28 10*3/MM3 (ref 3.4–10.8)
WBC NRBC COR # BLD AUTO: 9.47 10*3/MM3 (ref 3.4–10.8)

## 2025-02-06 PROCEDURE — 86147 CARDIOLIPIN ANTIBODY EA IG: CPT | Performed by: INTERNAL MEDICINE

## 2025-02-06 PROCEDURE — 85670 THROMBIN TIME PLASMA: CPT | Performed by: INTERNAL MEDICINE

## 2025-02-06 PROCEDURE — 85306 CLOT INHIBIT PROT S FREE: CPT | Performed by: INTERNAL MEDICINE

## 2025-02-06 PROCEDURE — 36415 COLL VENOUS BLD VENIPUNCTURE: CPT | Performed by: EMERGENCY MEDICINE

## 2025-02-06 PROCEDURE — 86148 ANTI-PHOSPHOLIPID ANTIBODY: CPT | Performed by: INTERNAL MEDICINE

## 2025-02-06 PROCEDURE — 83036 HEMOGLOBIN GLYCOSYLATED A1C: CPT | Performed by: INTERNAL MEDICINE

## 2025-02-06 PROCEDURE — 80053 COMPREHEN METABOLIC PANEL: CPT | Performed by: INTERNAL MEDICINE

## 2025-02-06 PROCEDURE — 85300 ANTITHROMBIN III ACTIVITY: CPT | Performed by: INTERNAL MEDICINE

## 2025-02-06 PROCEDURE — 85732 THROMBOPLASTIN TIME PARTIAL: CPT | Performed by: INTERNAL MEDICINE

## 2025-02-06 PROCEDURE — 83735 ASSAY OF MAGNESIUM: CPT | Performed by: INTERNAL MEDICINE

## 2025-02-06 PROCEDURE — 80061 LIPID PANEL: CPT | Performed by: INTERNAL MEDICINE

## 2025-02-06 PROCEDURE — 85730 THROMBOPLASTIN TIME PARTIAL: CPT | Performed by: INTERNAL MEDICINE

## 2025-02-06 PROCEDURE — 86146 BETA-2 GLYCOPROTEIN ANTIBODY: CPT | Performed by: INTERNAL MEDICINE

## 2025-02-06 PROCEDURE — 85705 THROMBOPLASTIN INHIBITION: CPT | Performed by: INTERNAL MEDICINE

## 2025-02-06 PROCEDURE — 81241 F5 GENE: CPT | Performed by: INTERNAL MEDICINE

## 2025-02-06 PROCEDURE — 25010000002 HEPARIN (PORCINE) 25000-0.45 UT/250ML-% SOLUTION: Performed by: INTERNAL MEDICINE

## 2025-02-06 PROCEDURE — 99417 PROLNG OP E/M EACH 15 MIN: CPT | Performed by: INTERNAL MEDICINE

## 2025-02-06 PROCEDURE — 85025 COMPLETE CBC W/AUTO DIFF WBC: CPT | Performed by: EMERGENCY MEDICINE

## 2025-02-06 PROCEDURE — 85025 COMPLETE CBC W/AUTO DIFF WBC: CPT | Performed by: INTERNAL MEDICINE

## 2025-02-06 PROCEDURE — G0378 HOSPITAL OBSERVATION PER HR: HCPCS

## 2025-02-06 PROCEDURE — 81240 F2 GENE: CPT | Performed by: INTERNAL MEDICINE

## 2025-02-06 PROCEDURE — 82948 REAGENT STRIP/BLOOD GLUCOSE: CPT

## 2025-02-06 PROCEDURE — 85303 CLOT INHIBIT PROT C ACTIVITY: CPT | Performed by: INTERNAL MEDICINE

## 2025-02-06 PROCEDURE — 83520 IMMUNOASSAY QUANT NOS NONAB: CPT | Performed by: INTERNAL MEDICINE

## 2025-02-06 PROCEDURE — 93971 EXTREMITY STUDY: CPT | Performed by: SURGERY

## 2025-02-06 PROCEDURE — 96366 THER/PROPH/DIAG IV INF ADDON: CPT

## 2025-02-06 PROCEDURE — 93971 EXTREMITY STUDY: CPT

## 2025-02-06 PROCEDURE — 99205 OFFICE O/P NEW HI 60 MIN: CPT | Performed by: INTERNAL MEDICINE

## 2025-02-06 PROCEDURE — 85613 RUSSELL VIPER VENOM DILUTED: CPT | Performed by: INTERNAL MEDICINE

## 2025-02-06 PROCEDURE — 85598 HEXAGNAL PHOSPH PLTLT NEUTRL: CPT | Performed by: INTERNAL MEDICINE

## 2025-02-06 RX ORDER — ATROPINE SULFATE 10 MG/ML
1 SOLUTION/ DROPS OPHTHALMIC 3 TIMES DAILY
Status: DISCONTINUED | OUTPATIENT
Start: 2025-02-06 | End: 2025-02-12 | Stop reason: HOSPADM

## 2025-02-06 RX ORDER — DIPHENOXYLATE HYDROCHLORIDE AND ATROPINE SULFATE 2.5; .025 MG/1; MG/1
1 TABLET ORAL DAILY
Status: DISCONTINUED | OUTPATIENT
Start: 2025-02-06 | End: 2025-02-12 | Stop reason: HOSPADM

## 2025-02-06 RX ORDER — METHOTREXATE 2.5 MG/1
20 TABLET ORAL WEEKLY
Status: DISCONTINUED | OUTPATIENT
Start: 2025-02-06 | End: 2025-02-06

## 2025-02-06 RX ORDER — METHOTREXATE 2.5 MG/1
2.5 TABLET ORAL WEEKLY
Status: DISCONTINUED | OUTPATIENT
Start: 2025-02-06 | End: 2025-02-06

## 2025-02-06 RX ORDER — ROSUVASTATIN CALCIUM 5 MG/1
10 TABLET, COATED ORAL DAILY
Status: DISCONTINUED | OUTPATIENT
Start: 2025-02-06 | End: 2025-02-12 | Stop reason: HOSPADM

## 2025-02-06 RX ORDER — CETIRIZINE HYDROCHLORIDE 10 MG/1
5 TABLET ORAL 2 TIMES DAILY
Status: DISCONTINUED | OUTPATIENT
Start: 2025-02-06 | End: 2025-02-12 | Stop reason: HOSPADM

## 2025-02-06 RX ORDER — METFORMIN HYDROCHLORIDE 500 MG/1
1000 TABLET, EXTENDED RELEASE ORAL
Status: DISCONTINUED | OUTPATIENT
Start: 2025-02-06 | End: 2025-02-06

## 2025-02-06 RX ORDER — POLYETHYLENE GLYCOL 3350 17 G/17G
17 POWDER, FOR SOLUTION ORAL DAILY PRN
Status: DISCONTINUED | OUTPATIENT
Start: 2025-02-06 | End: 2025-02-12 | Stop reason: HOSPADM

## 2025-02-06 RX ORDER — BRIMONIDINE TARTRATE 2 MG/ML
1 SOLUTION/ DROPS OPHTHALMIC 3 TIMES DAILY
Status: DISCONTINUED | OUTPATIENT
Start: 2025-02-06 | End: 2025-02-06

## 2025-02-06 RX ORDER — ALBUTEROL SULFATE 0.83 MG/ML
2.5 SOLUTION RESPIRATORY (INHALATION) EVERY 6 HOURS PRN
Status: DISCONTINUED | OUTPATIENT
Start: 2025-02-06 | End: 2025-02-12 | Stop reason: HOSPADM

## 2025-02-06 RX ORDER — ACETAMINOPHEN 325 MG/1
650 TABLET ORAL EVERY 6 HOURS PRN
Status: DISCONTINUED | OUTPATIENT
Start: 2025-02-06 | End: 2025-02-12 | Stop reason: HOSPADM

## 2025-02-06 RX ORDER — AMOXICILLIN 250 MG
2 CAPSULE ORAL 2 TIMES DAILY PRN
Status: DISCONTINUED | OUTPATIENT
Start: 2025-02-06 | End: 2025-02-12 | Stop reason: HOSPADM

## 2025-02-06 RX ORDER — DORZOLAMIDE HYDROCHLORIDE AND TIMOLOL MALEATE 20; 5 MG/ML; MG/ML
1 SOLUTION/ DROPS OPHTHALMIC 2 TIMES DAILY
COMMUNITY

## 2025-02-06 RX ORDER — BISACODYL 5 MG/1
5 TABLET, DELAYED RELEASE ORAL DAILY PRN
Status: DISCONTINUED | OUTPATIENT
Start: 2025-02-06 | End: 2025-02-12 | Stop reason: HOSPADM

## 2025-02-06 RX ORDER — FOLIC ACID 0.4 MG
400 TABLET ORAL DAILY
Status: DISCONTINUED | OUTPATIENT
Start: 2025-02-06 | End: 2025-02-12 | Stop reason: HOSPADM

## 2025-02-06 RX ORDER — BENZONATATE 100 MG/1
200 CAPSULE ORAL 3 TIMES DAILY PRN
Status: DISCONTINUED | OUTPATIENT
Start: 2025-02-06 | End: 2025-02-12 | Stop reason: HOSPADM

## 2025-02-06 RX ORDER — BISACODYL 10 MG
10 SUPPOSITORY, RECTAL RECTAL DAILY PRN
Status: DISCONTINUED | OUTPATIENT
Start: 2025-02-06 | End: 2025-02-12 | Stop reason: HOSPADM

## 2025-02-06 RX ORDER — METHOTREXATE 2.5 MG/1
20 TABLET ORAL WEEKLY
Status: DISCONTINUED | OUTPATIENT
Start: 2025-02-06 | End: 2025-02-12 | Stop reason: HOSPADM

## 2025-02-06 RX ORDER — SODIUM CHLORIDE 0.9 % (FLUSH) 0.9 %
10 SYRINGE (ML) INJECTION AS NEEDED
Status: DISCONTINUED | OUTPATIENT
Start: 2025-02-06 | End: 2025-02-12 | Stop reason: HOSPADM

## 2025-02-06 RX ORDER — ONDANSETRON 4 MG/1
4 TABLET, ORALLY DISINTEGRATING ORAL 4 TIMES DAILY PRN
Status: DISCONTINUED | OUTPATIENT
Start: 2025-02-06 | End: 2025-02-12 | Stop reason: HOSPADM

## 2025-02-06 RX ORDER — BROMFENAC SODIUM 0.7 MG/ML
1 SOLUTION/ DROPS OPHTHALMIC
Status: DISCONTINUED | OUTPATIENT
Start: 2025-02-07 | End: 2025-02-12 | Stop reason: HOSPADM

## 2025-02-06 RX ORDER — KETOROLAC TROMETHAMINE 5 MG/ML
1 SOLUTION OPHTHALMIC 4 TIMES DAILY
Status: DISCONTINUED | OUTPATIENT
Start: 2025-02-06 | End: 2025-02-06

## 2025-02-06 RX ORDER — DOXYCYCLINE 25 MG/5ML
50 POWDER, FOR SUSPENSION ORAL 2 TIMES DAILY
Status: DISCONTINUED | OUTPATIENT
Start: 2025-02-06 | End: 2025-02-12 | Stop reason: HOSPADM

## 2025-02-06 RX ORDER — METHOTREXATE 2.5 MG/1
20 TABLET ORAL WEEKLY
Status: DISCONTINUED | OUTPATIENT
Start: 2025-02-13 | End: 2025-02-06

## 2025-02-06 RX ORDER — DORZOLAMIDE HYDROCHLORIDE AND TIMOLOL MALEATE 20; 5 MG/ML; MG/ML
1 SOLUTION/ DROPS OPHTHALMIC 2 TIMES DAILY
Status: DISCONTINUED | OUTPATIENT
Start: 2025-02-06 | End: 2025-02-12 | Stop reason: HOSPADM

## 2025-02-06 RX ORDER — SODIUM CHLORIDE 9 MG/ML
40 INJECTION, SOLUTION INTRAVENOUS AS NEEDED
Status: DISCONTINUED | OUTPATIENT
Start: 2025-02-06 | End: 2025-02-12 | Stop reason: HOSPADM

## 2025-02-06 RX ORDER — SODIUM CHLORIDE 0.9 % (FLUSH) 0.9 %
10 SYRINGE (ML) INJECTION EVERY 12 HOURS SCHEDULED
Status: DISCONTINUED | OUTPATIENT
Start: 2025-02-06 | End: 2025-02-12 | Stop reason: HOSPADM

## 2025-02-06 RX ORDER — DAPAGLIFLOZIN AND METFORMIN HYDROCHLORIDE 10; 1000 MG/1; MG/1
1 TABLET, FILM COATED, EXTENDED RELEASE ORAL
Status: DISCONTINUED | OUTPATIENT
Start: 2025-02-06 | End: 2025-02-12 | Stop reason: HOSPADM

## 2025-02-06 RX ORDER — BRIMONIDINE TARTRATE 1 MG/ML
1 SOLUTION/ DROPS OPHTHALMIC 2 TIMES DAILY
Status: DISCONTINUED | OUTPATIENT
Start: 2025-02-06 | End: 2025-02-12 | Stop reason: HOSPADM

## 2025-02-06 RX ADMIN — HEPARIN SODIUM 15.9 UNITS/KG/HR: 10000 INJECTION, SOLUTION INTRAVENOUS at 17:29

## 2025-02-06 RX ADMIN — DORZOLAMIDE HYDROCHLORIDE AND TIMOLOL MALEATE 1 DROP: 20; 5 SOLUTION/ DROPS OPHTHALMIC at 10:39

## 2025-02-06 RX ADMIN — ATROPINE SULFATE 1 DROP: 10 SOLUTION/ DROPS OPHTHALMIC at 17:28

## 2025-02-06 RX ADMIN — ATROPINE SULFATE 1 DROP: 10 SOLUTION/ DROPS OPHTHALMIC at 21:11

## 2025-02-06 RX ADMIN — ACETAMINOPHEN 325MG 650 MG: 325 TABLET ORAL at 05:25

## 2025-02-06 RX ADMIN — BRIMONIDINE TARTRATE 1 DROP: 1 SOLUTION/ DROPS OPHTHALMIC at 21:11

## 2025-02-06 RX ADMIN — ROSUVASTATIN CALCIUM 10 MG: 5 TABLET, FILM COATED ORAL at 10:35

## 2025-02-06 RX ADMIN — DORZOLAMIDE HYDROCHLORIDE AND TIMOLOL MALEATE 1 DROP: 20; 5 SOLUTION/ DROPS OPHTHALMIC at 21:11

## 2025-02-06 RX ADMIN — ACETAMINOPHEN 325MG 650 MG: 325 TABLET ORAL at 17:28

## 2025-02-06 RX ADMIN — DAPAGLIFLOZIN AND METFORMIN HYDROCHLORIDE 1 TABLET: 10; 1000 TABLET, FILM COATED, EXTENDED RELEASE ORAL at 21:04

## 2025-02-06 RX ADMIN — Medication 400 MCG: at 10:35

## 2025-02-06 RX ADMIN — Medication 1 TABLET: at 10:38

## 2025-02-06 RX ADMIN — ATROPINE SULFATE 1 DROP: 10 SOLUTION/ DROPS OPHTHALMIC at 10:38

## 2025-02-06 RX ADMIN — Medication 10 ML: at 10:41

## 2025-02-06 RX ADMIN — ACETAMINOPHEN 325MG 650 MG: 325 TABLET ORAL at 23:10

## 2025-02-06 RX ADMIN — DOXYCYCLINE 50 MG: 25 FOR SUSPENSION ORAL at 12:50

## 2025-02-06 RX ADMIN — Medication 10 ML: at 21:11

## 2025-02-06 RX ADMIN — METHOTREXATE 20 MG: 2.5 TABLET ORAL at 17:40

## 2025-02-06 RX ADMIN — BRIMONIDINE TARTRATE 1 DROP: 2 SOLUTION/ DROPS OPHTHALMIC at 10:38

## 2025-02-06 RX ADMIN — DOXYCYCLINE 50 MG: 25 FOR SUSPENSION ORAL at 21:06

## 2025-02-06 RX ADMIN — Medication 1 TABLET: at 10:35

## 2025-02-06 NOTE — PROGRESS NOTES
Clinical Pharmacy Services: Medication History    Lala Dejesus is a 64 y.o. female presenting to [facility] for Right leg pain [M79.604]  Right arm pain [M79.601]  Bilateral pulmonary embolism [I26.99]  Hyperglycemia due to diabetes mellitus [E11.65]    She  has a past medical history of Abdominal pain, Abnormal Pap smear of cervix, Allergic reaction, Allergic rhinitis, Amenorrhea, Ankle sprain (20+ years), Breast lump, DM (diabetes mellitus), FH: early death, Gastritis, Gastroparesis, GERD (gastroesophageal reflux disease), Glaucoma, H/O mammogram, Hair loss, Hiatal hernia, History of bone density study (10/01/2012), History of colonoscopy (03/2015), History of EKG (12/06/2012), Hyperlipidemia, Insomnia, Insulin resistance, Knee sprain (50 yrs ago), Low back pain, Low back strain (20+ years ago), Metabolic syndrome, Papanicolaou smear (01/2016), Retinal break, Right knee DJD, Stricture of esophagus, Trauma, and Weight loss.    Allergies as of 02/05/2025 - Reviewed 02/05/2025   Allergen Reaction Noted    Ketorolac Anaphylaxis 05/09/2016    Morphine and codeine Anaphylaxis 05/09/2016    Aspirin Nausea And Vomiting 05/09/2016    Metformin and related Other (See Comments) 05/09/2016       Medication information was obtained from: patient   Pharmacy and Phone Number:     Prior to Admission Medications       Prescriptions Last Dose Informant Patient Reported? Taking?    albuterol sulfate  (90 Base) MCG/ACT inhaler   Yes Yes    INL 2 PFS PO Q 4 H PRN    Alphagan P 0.1 % solution ophthalmic solution   Yes Yes    Administer 1 drop to both eyes 2 (Two) Times a Day.    atropine 1 % ophthalmic solution   Yes Yes    Administer 1 drop to both eyes Every Morning.    Biotin 10 MG tablet   Yes Yes    Take  by mouth Daily.    Calcium Carb-Cholecalciferol 600-200 MG-UNIT tablet   Yes Yes    Take  by mouth.    dorzolamide-timolol (COSOPT) 2-0.5 % ophthalmic solution   Yes Yes    Administer 1 drop to both eyes 2  (Two) Times a Day.    doxycycline (ADOXA) 50 MG tablet   Yes Yes    Take 1 tablet by mouth 2 (Two) Times a Day.    folic acid (FOLVITE) 1 MG tablet   Yes Yes    Take 1 tablet by mouth 2 (Two) Times a Day.    inFLIXimab (REMICADE IV)   Yes Yes    Infuse  into a venous catheter.    Lifitegrast (XIIDRA) 5 % ophthalmic solution   Yes Yes    Administer 1 drop to both eyes Every Other Day.    loratadine-pseudoephedrine (CLARITIN-D 12-hour) 5-120 MG per 12 hr tablet   Yes Yes    Take  by mouth.    methotrexate 2.5 MG tablet 1/29/2025  Yes Yes    Take 8 tablets by mouth 1 (One) Time Per Week. wednesday    Multiple Vitamin (MULTI VITAMIN DAILY PO)   Yes Yes    Take  by mouth.    Prolensa 0.07 % solution   Yes Yes    Administer 1 drop to both eyes Every Morning.    Rocklatan 0.02-0.005 % solution   Yes Yes    Administer 1 Application to both eyes Every Night.    rosuvastatin (CRESTOR) 10 MG tablet  Self Yes Yes    Take 1 tablet by mouth Daily.    Xigduo XR  MG tablet   Yes Yes    Take 1 tablet by mouth Daily.              Medication notes:     This medication list is complete to the best of my knowledge as of 2/6/2025    Please call if questions.    Wanda Kevin, PharmD, BCPS  2/6/2025 09:23 EST

## 2025-02-06 NOTE — ED PROVIDER NOTES
EMERGENCY DEPARTMENT ENCOUNTER  Room Number:  12/12  PCP: Martin Vilchis MD  Independent Historians: Patient      HPI:  Chief Complaint: had concerns including Leg Pain. Chest pain, and right upper arm pain    A complete HPI/ROS/PMH/PSH/SH/FH are unobtainable due to: None    Chronic or social conditions impacting patient care (Social Determinants of Health): None      Context: Lala Deejsus is a 64 y.o. female with a medical history of diabetes, GERD, gastritis and a bundle branch block who presents to the ED c/o acute right leg pain and right arm pain since Saturday.  Patient says that last week she was taking a walk around her neighborhood and walked approximately 4 blocks.  When she returned home, she noted that she has some soreness in the back of her right lower leg.  Over the weekend, that tenderness increased and she had some swelling noted in the right leg.  She also complains of some swelling and tenderness in her right arm that radiated towards the chest.  She denies any dyspnea.  Denies any cough or cold or flulike symptoms.  Denies vomiting or diarrhea.      Review of prior external notes (non-ED) -and- Review of prior external test results outside of this encounter: I independently reviewed the cardiology progress note from September 14, 2023.  Cardiac workup was reviewed with her.  She had an equivocal ECG stress test.  Ejection fraction was calculated at 60%.  There was no evidence of ischemia on the myocardial perfusion imaging.  Left bundle branch block was noted on ECG    Prescription drug monitoring program review: AMISH reviewed by Varghese Borden MD, rTavon Tian MD       PAST MEDICAL HISTORY  Active Ambulatory Problems     Diagnosis Date Noted    Patchy loss of hair 05/09/2016    Metabolic syndrome with insulin resistance 05/09/2016    Vitamin B12 deficiency 05/09/2016    Gastroparesis with metabolic syndrome 05/09/2016    Esophageal hiatal hernia 05/09/2016    Stricture of  esophagus 05/09/2016    Paradoxical insomnia 05/09/2016    Menopausal syndrome 05/09/2016    Family history of early death (Dad at age 32) 05/09/2016    Allergic rhinitis 05/09/2016    Laceration of ankle with tendon involvement 05/09/2016    Bronchitis 11/17/2016    Cough 11/17/2016    Acute bacterial conjunctivitis 11/17/2016    Fall (on) (from) other stairs and steps, initial encounter 02/08/2017    Right shoulder injury 02/08/2017    Numbness of right hand 02/08/2017    Hematuria 03/09/2017    FH ischemic heart disease 04/15/2020    Precordial pain 04/15/2020    Weight gain 04/15/2020    SOB (shortness of breath) 04/15/2020    Hyperlipidemia 04/15/2020     Resolved Ambulatory Problems     Diagnosis Date Noted    No Resolved Ambulatory Problems     Past Medical History:   Diagnosis Date    Abdominal pain     Abnormal Pap smear of cervix     Allergic reaction     Amenorrhea     Ankle sprain 20+ years    Breast lump     DM (diabetes mellitus)     FH: early death     Gastritis     GERD (gastroesophageal reflux disease)     Glaucoma     H/O mammogram     Hair loss     Hiatal hernia     History of bone density study 10/01/2012    History of colonoscopy 03/2015    History of EKG 12/06/2012    Insomnia     Insulin resistance     Knee sprain 50 yrs ago    Low back pain     Low back strain 20+ years ago    Papanicolaou smear 01/2016    Retinal break     Right knee DJD     Trauma     Weight loss          PAST SURGICAL HISTORY  Past Surgical History:   Procedure Laterality Date    COLONOSCOPY  04/25/2014    EYE SURGERY Bilateral     catarac, upper and lower tubes in both eyes    LASIK Right 09/01/2012    LEEP N/A 1994    SURG   LEEP Procedure    NOSE SURGERY           FAMILY HISTORY  Family History   Problem Relation Age of Onset    Hypertension Mother     Heart disease Father     Stroke Father     Heart attack Father     Hypertension Sister     Obesity Sister     Stroke Sister     Heart attack Paternal Grandfather      Stroke Paternal Grandfather     Heart disease Other     Sudden death Other          SOCIAL HISTORY  Social History     Socioeconomic History    Marital status: Single    Number of children: 2    Years of education: 12+4.5   Tobacco Use    Smoking status: Never     Passive exposure: Never    Smokeless tobacco: Never   Vaping Use    Vaping status: Never Used   Substance and Sexual Activity    Alcohol use: Yes     Comment: Only social at holidays, 1 drink.    Drug use: No    Sexual activity: Yes     Partners: Male     Birth control/protection: Post-menopausal         ALLERGIES  Ketorolac, Morphine and codeine, Aspirin, and Metformin and related      REVIEW OF SYSTEMS  Review of Systems  Included in HPI  All systems reviewed and negative except for those discussed in HPI.      PHYSICAL EXAM    I have reviewed the triage vital signs and nursing notes.    ED Triage Vitals   Temp Heart Rate Resp BP SpO2   02/05/25 1903 02/05/25 1903 02/05/25 1903 02/05/25 1911 02/05/25 1903   97.6 °F (36.4 °C) 91 16 149/95 98 %      Temp src Heart Rate Source Patient Position BP Location FiO2 (%)   02/05/25 1903 -- 02/05/25 1911 -- --   Tympanic  Sitting         Physical Exam  GENERAL: alert, no acute distress  SKIN: Warm, dry, no rashes  HENT: Normocephalic, atraumatic  EYES: no scleral icterus, normal conjunctivae  CV: regular rhythm, regular rate, normal pulses  RESPIRATORY: normal effort, lungs clear bilaterally, no stridor  ABDOMEN: soft, nondistended, nontender  MUSCULOSKELETAL: no deformity.  Mild edema and tenderness to palpation in the right posterior lower leg.  No erythema or induration present.  NEURO: alert, moves all extremities, follows commands      LAB RESULTS  Recent Results (from the past 24 hours)   ECG 12 Lead Other; Chest pain and right arm pain    Collection Time: 02/05/25  8:47 PM   Result Value Ref Range    QT Interval 458 ms    QTC Interval 525 ms   Comprehensive Metabolic Panel    Collection Time: 02/05/25  9:00  PM    Specimen: Blood   Result Value Ref Range    Glucose 156 (H) 65 - 99 mg/dL    BUN 12 8 - 23 mg/dL    Creatinine 0.73 0.57 - 1.00 mg/dL    Sodium 141 136 - 145 mmol/L    Potassium 3.9 3.5 - 5.2 mmol/L    Chloride 104 98 - 107 mmol/L    CO2 27.4 22.0 - 29.0 mmol/L    Calcium 9.0 8.6 - 10.5 mg/dL    Total Protein 8.3 6.0 - 8.5 g/dL    Albumin 3.9 3.5 - 5.2 g/dL    ALT (SGPT) 12 1 - 33 U/L    AST (SGOT) 13 1 - 32 U/L    Alkaline Phosphatase 131 (H) 39 - 117 U/L    Total Bilirubin 0.5 0.0 - 1.2 mg/dL    Globulin 4.4 gm/dL    A/G Ratio 0.9 g/dL    BUN/Creatinine Ratio 16.4 7.0 - 25.0    Anion Gap 9.6 5.0 - 15.0 mmol/L    eGFR 93.1 >60.0 mL/min/1.73   Green Top (Gel)    Collection Time: 02/05/25  9:00 PM   Result Value Ref Range    Extra Tube Hold for add-ons.    Lavender Top    Collection Time: 02/05/25  9:00 PM   Result Value Ref Range    Extra Tube hold for add-on    Gold Top - SST    Collection Time: 02/05/25  9:00 PM   Result Value Ref Range    Extra Tube Hold for add-ons.    Light Blue Top    Collection Time: 02/05/25  9:00 PM   Result Value Ref Range    Extra Tube Hold for add-ons.    CBC Auto Differential    Collection Time: 02/05/25  9:00 PM    Specimen: Blood   Result Value Ref Range    WBC 10.29 3.40 - 10.80 10*3/mm3    RBC 4.46 3.77 - 5.28 10*6/mm3    Hemoglobin 13.1 12.0 - 15.9 g/dL    Hematocrit 38.8 34.0 - 46.6 %    MCV 87.0 79.0 - 97.0 fL    MCH 29.4 26.6 - 33.0 pg    MCHC 33.8 31.5 - 35.7 g/dL    RDW 12.3 12.3 - 15.4 %    RDW-SD 38.3 37.0 - 54.0 fl    MPV 10.1 6.0 - 12.0 fL    Platelets 209 140 - 450 10*3/mm3    Neutrophil % 50.8 42.7 - 76.0 %    Lymphocyte % 36.8 19.6 - 45.3 %    Monocyte % 11.1 5.0 - 12.0 %    Eosinophil % 0.8 0.3 - 6.2 %    Basophil % 0.2 0.0 - 1.5 %    Immature Grans % 0.3 0.0 - 0.5 %    Neutrophils, Absolute 5.23 1.70 - 7.00 10*3/mm3    Lymphocytes, Absolute 3.79 (H) 0.70 - 3.10 10*3/mm3    Monocytes, Absolute 1.14 (H) 0.10 - 0.90 10*3/mm3    Eosinophils, Absolute 0.08 0.00 -  0.40 10*3/mm3    Basophils, Absolute 0.02 0.00 - 0.20 10*3/mm3    Immature Grans, Absolute 0.03 0.00 - 0.05 10*3/mm3    nRBC 0.0 0.0 - 0.2 /100 WBC   Protime-INR    Collection Time: 02/05/25  9:00 PM    Specimen: Blood   Result Value Ref Range    Protime 14.2 11.7 - 14.2 Seconds    INR 1.10 0.90 - 1.10   aPTT    Collection Time: 02/05/25  9:00 PM    Specimen: Blood   Result Value Ref Range    PTT 30.7 22.7 - 35.4 seconds   High Sensitivity Troponin T    Collection Time: 02/05/25  9:00 PM    Specimen: Blood   Result Value Ref Range    HS Troponin T 6 <14 ng/L   D-dimer, Quantitative    Collection Time: 02/05/25  9:00 PM    Specimen: Blood   Result Value Ref Range    D-Dimer, Quantitative 2.86 (H) 0.00 - 0.62 MCGFEU/mL   High Sensitivity Troponin T 1Hr    Collection Time: 02/05/25 11:31 PM    Specimen: Arm, Left; Blood   Result Value Ref Range    HS Troponin T <6 <14 ng/L    Troponin T Numeric Delta           RADIOLOGY  CT Angiogram Chest    Result Date: 2/5/2025  CT ANGIOGRAM OF THE CHEST  HISTORY: Chest pain  COMPARISON: None available.  TECHNIQUE: Axial CT imaging was obtained through the thorax. IV contrast was administered. Three-D reformatted images were obtained.  FINDINGS: There are bilateral pulmonary emboli, with the most proximal thrombus noted within the main left pulmonary artery. There is no evidence of right heart strain. The thyroid gland, trachea, and esophagus appear unremarkable. No definite coronary artery calcifications are seen. Mediastinal lymph nodes do not appear pathologically enlarged. I don't see any evidence of pulmonary infarction. Images through the upper abdomen do not demonstrate any acute abnormalities. No acute osseous abnormalities are seen.      Bilateral pulmonary emboli, without evidence of right heart strain or pulmonary infarction.  FINDINGS were called to Dr. Tian at 11:00 p.m.  Radiation dose reduction techniques were utilized, including automated exposure control and  exposure modulation based on body size.   This report was finalized on 2/5/2025 11:02 PM by Dr. Georgina Hicks M.D on Workstation: [id number]      XR Chest 1 View    Result Date: 2/5/2025  SINGLE VIEW OF THE CHEST  HISTORY: Chest pain  COMPARISON: July 19, 2024  FINDINGS: Heart size is within normal limits. No pneumothorax, pleural effusion, or acute infiltrate is seen. There is calcification of the aorta.      No acute findings.  This report was finalized on 2/5/2025 9:50 PM by Dr. Georgina Hicks M.D on Workstation: [id number]         MEDICATIONS GIVEN IN ER  Medications   sodium chloride 0.9 % flush 10 mL (has no administration in time range)   heparin 30967 units/250 mL (100 units/mL) in 0.45 % NaCl infusion (15.9 Units/kg/hr × 94.3 kg Intravenous New Bag 2/5/25 9864)   iopamidol (ISOVUE-370) 76 % injection 100 mL (78 mL Intravenous Given 2/5/25 0555)         ORDERS PLACED DURING THIS VISIT:  Orders Placed This Encounter   Procedures    XR Chest 1 View    CT Angiogram Chest    Plover Draw    Comprehensive Metabolic Panel    CBC Auto Differential    Protime-INR    aPTT    High Sensitivity Troponin T    D-dimer, Quantitative    High Sensitivity Troponin T 1Hr    aPTT    aPTT    CBC Auto Differential    CBC Auto Differential    NPO Diet NPO Type: Strict NPO    Undress & Gown    Monitor Blood Pressure    Adjust Heparin Rate Based on aPTT Using Nomogram    Verify All Anticoagulant Orders Are Discontinued Upon Initiation of Heparin Protocol (eg Enoxaparin, Fondaparinux, Apixaban, Dabigatran, Edoxaban, or Rivaroxaban)    RN To Release aPTT Order 6 Hours After Heparin Infusion & 6 Hours After Each Infusion Change Until  2 Consecutive Therapeutic aPTTs Are Achieved    IP General Consult (Use specialty-specific consult if known)    ECG 12 Lead Other; Chest pain and right arm pain    Insert Peripheral IV    Initiate Observation Status    CBC & Differential    Green Top (Gel)    Lavender Top    Gold Top - SST     Light Blue Top    CBC & Differential         OUTPATIENT MEDICATION MANAGEMENT:  Current Facility-Administered Medications Ordered in Epic   Medication Dose Route Frequency Provider Last Rate Last Admin    heparin 45209 units/250 mL (100 units/mL) in 0.45 % NaCl infusion  15.9 Units/kg/hr Intravenous Titrated Travon Tian MD 14.99 mL/hr at 02/05/25 2358 15.9 Units/kg/hr at 02/05/25 2358    sodium chloride 0.9 % flush 10 mL  10 mL Intravenous PRN Travon Tian MD         Current Outpatient Medications Ordered in Epic   Medication Sig Dispense Refill    albuterol sulfate  (90 Base) MCG/ACT inhaler INL 2 PFS PO Q 4 H PRN      Alphagan P 0.1 % solution ophthalmic solution       atropine 1 % ophthalmic solution       benzonatate (TESSALON) 200 MG capsule Take 1 capsule by mouth 3 (Three) Times a Day As Needed for Cough. (Patient not taking: Reported on 11/26/2024) 20 capsule 0    Biotin 10 MG tablet Take  by mouth Daily.      Calcium Carb-Cholecalciferol 600-200 MG-UNIT tablet Take  by mouth.      Dapagliflozin-metFORMIN HCl (XIGDUO XR PO) Take  by mouth.      difluprednate (DUREZOL) 0.05 % ophthalmic emulsion       doxycycline (ADOXA) 50 MG tablet Take 20 mg by mouth 2 (Two) Times a Day.      folic acid (FOLVITE) 1 MG tablet       inFLIXimab (REMICADE IV) Infuse  into a venous catheter.      loratadine-pseudoephedrine (CLARITIN-D 12-hour) 5-120 MG per 12 hr tablet Take  by mouth.      methotrexate 2.5 MG tablet       Multiple Vitamin (MULTI VITAMIN DAILY PO) Take  by mouth.      ondansetron ODT (ZOFRAN-ODT) 4 MG disintegrating tablet Place 1 tablet on the tongue 4 (Four) Times a Day As Needed for Nausea. (Patient not taking: Reported on 11/26/2024) 20 tablet 0    Prolensa 0.07 % solution       promethazine-dextromethorphan (PROMETHAZINE-DM) 6.25-15 MG/5ML syrup Take 5 mL by mouth 4 (Four) Times a Day As Needed for Cough. (Patient not taking: Reported on 11/26/2024) 180 mL 0    Rocklatan 0.02-0.005 %  solution       rosuvastatin (CRESTOR) 10 MG tablet Take 1 tablet by mouth Daily.      Xigduo XR  MG tablet            PROCEDURES  Procedures      Critical care provider statement:    Critical care time (minutes): 33.   Critical care time was exclusive of:  Separately billable procedures and treating other patients   Critical care was necessary to treat or prevent imminent or life-threatening deterioration of the following conditions:  Cardiac Failure, Circulatory Failure, and Respiratory Failure   Critical care was time spent personally by me on the following activities:  Development of treatment plan with patient or surrogate, discussions with consultants, evaluation of patient's response to treatment, examination of patient, obtaining history from patient or surrogate, ordering and performing treatments and interventions, ordering and review of laboratory studies, ordering and review of radiographic studies, pulse oximetry, re-evaluation of patient's condition and review of old charts. Critical Care indicators: Pulmonary edema or embolism Others: aminophylline, diazepam, glucagon, heparin, lovenox, morphine, sodium bicarbonate, et al.      PROGRESS, DATA ANALYSIS, CONSULTS, AND MEDICAL DECISION MAKING  All labs have been independently interpreted by me.  All radiology studies have been reviewed by me. All EKG's have been independently viewed and interpreted by me.  Discussion below represents my analysis of pertinent findings related to patient's condition, differential diagnosis, treatment plan and final disposition.    Differential diagnosis includes but is not limited to pulmonary embolism, aortic dissection, cervical radiculopathy, DVT, lumbar radiculopathy, muscle strain.    Clinical Scores:                   ED Course as of 02/06/25 0024   Wed Feb 05, 2025 2101 EKG         EKG time/Interp time: 2047/2055  Rhythm/Rate: Sinus rhythm, 79 bpm  P waves and HI: Present, 152 ms, normal interval  QRS, axis:  Left bundle branch block, 154 ms, normal axis  ST and T waves: ST segment elevation is present in V1 and V2.  However this is demonstrated in old studies as well, specifically on August 22, 2023 and September 17, 2024.  I think it is probably a consequence of the left bundle branch block.  No new acute ischemic changes are present.  Independently interpreted by me contemporaneously with treatment   [VALENTIN]   2310 D-Dimer, Quant(!): 2.86 [VALENTIN]   2310 HS Troponin T: 6 [VALENTIN]   2310 I independently interpreted the CT angiogram chest study and my findings are: No pneumothorax, there are findings of bilateral pulmonary emboli [VALENTIN]   2356 I discussed with Dr. Quintero about this patient.  He agrees to admit her to his service for further medical management tonight. [VALENTIN]   Thu Feb 06, 2025   0023 Thankfully, patient's work of breathing and oxygenation are normal on room air.  However she does have bilateral pulmonary emboli findings.  I suspect that her right leg and right arm pain symptoms may also be related to DVT.  Unfortunately I cannot order an ultrasound vascular study at this time since we do not have vascular 's in the evening hours.  We are starting her on a heparin drip for management of anticoagulation tonight.  Will admit her for further workup and consultation in the morning.  She is agreeable with that plan. [VALENTIN]      ED Course User Index  [VALENTIN] Travon Tian MD       AS OF 00:24 EST VITALS:    BP - 149/95  HR - 91  TEMP - 97.6 °F (36.4 °C) (Tympanic)  O2 SATS - 98%    COMPLEXITY OF CARE  The patient requires admission.      DIAGNOSIS  Final diagnoses:   Bilateral pulmonary embolism   Right leg pain   Right arm pain   Hyperglycemia due to diabetes mellitus       DISPOSITION  ED Disposition       ED Disposition   Decision to Admit    Condition   --    Comment   Level of Care: Telemetry [5]  Diagnosis: Bilateral pulmonary embolism [[id number]]  Admitting Physician: MIRANDA QUINTERO [[id number]]  Attending  Physician: MIRANDA QUINTERO [[id number]]                  Please note that portions of this document were completed with a voice recognition program.    Note Disclaimer: At [facility], we believe that sharing information builds trust and better relationships. You are receiving this note because you recently visited [facility]. It is possible you will see health information before a provider has talked with you about it. This kind of information can be easy to misunderstand. To help you fully understand what it means for your health, we urge you to discuss this note with your provider.         Travon Tian MD  02/06/25 0024
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
## 2025-02-06 NOTE — ED NOTES
Nursing report ED to floor  Lala Dejesus  64 y.o.  female    HPI :  HPI  Stated Reason for Visit: leg pain.    Chief Complaint  Chief Complaint   Patient presents with    Leg Pain       Admitting doctor:   Varghese Borden MD    Admitting diagnosis:   The encounter diagnosis was Bilateral pulmonary embolism.    Code status:   Current Code Status       Date Active Code Status Order ID Comments User Context       Not on file            Allergies:   Ketorolac, Morphine and codeine, Aspirin, and Metformin and related    Isolation:   No active isolations    Intake and Output  No intake or output data in the 24 hours ending 02/05/25 2347    Weight:       02/05/25  2300   Weight: 94.3 kg (207 lb 14.3 oz)       Most recent vitals:   Vitals:    02/05/25 1903 02/05/25 1911 02/05/25 2300   BP:  149/95    Patient Position:  Sitting    Pulse: 91     Resp: 16     Temp: 97.6 °F (36.4 °C)     TempSrc: Tympanic     SpO2: 98%     Weight:   94.3 kg (207 lb 14.3 oz)       Active LDAs/IV Access:   Lines, Drains & Airways       Active LDAs       Name Placement date Placement time Site Days    Peripheral IV 02/05/25 2103 Right Antecubital 02/05/25 2103  Antecubital  less than 1                    Labs (abnormal labs have a star):   Labs Reviewed   COMPREHENSIVE METABOLIC PANEL - Abnormal; Notable for the following components:       Result Value    Glucose 156 (*)     Alkaline Phosphatase 131 (*)     All other components within normal limits    Narrative:     GFR Categories in Chronic Kidney Disease (CKD)      GFR Category          GFR (mL/min/1.73)    Interpretation  G1                     90 or greater         Normal or high (1)  G2                      60-89                Mild decrease (1)  G3a                   45-59                Mild to moderate decrease  G3b                   30-44                Moderate to severe decrease  G4                    15-29                Severe decrease  G5                    14 or less            "Kidney failure          (1)In the absence of evidence of kidney disease, neither GFR category G1 or G2 fulfill the criteria for CKD.    eGFR calculation 2021 CKD-EPI creatinine equation, which does not include race as a factor   CBC WITH AUTO DIFFERENTIAL - Abnormal; Notable for the following components:    Lymphocytes, Absolute 3.79 (*)     Monocytes, Absolute 1.14 (*)     All other components within normal limits   D-DIMER, QUANTITATIVE - Abnormal; Notable for the following components:    D-Dimer, Quantitative 2.86 (*)     All other components within normal limits    Narrative:     According to the assay 's published package insert, a normal (<0.50 MCGFEU/mL) D-dimer result in conjunction with a non-high clinical probability assessment, excludes deep vein thrombosis (DVT) and pulmonary embolism (PE) with high sensitivity.    D-dimer values increase with age and this can make VTE exclusion of an older population difficult. To address this, the [employer], based on best available evidence and recent guidelines, recommends that clinicians use age-adjusted D-dimer thresholds in patients greater than 50 years of age with: a) a low probability of PE who do not meet all Pulmonary Embolism Rule Out Criteria, or b) in those with intermediate probability of PE.   The formula for an age-adjusted D-dimer cut-off is \"age/100\".  For example, a 60 year old patient would have an age-adjusted cut-off of 0.60 MCGFEU/mL and an 80 year old 0.80 MCGFEU/mL.   PROTIME-INR - Normal   APTT - Normal   TROPONIN - Normal    Narrative:     High Sensitive Troponin T Reference Range:  <14.0 ng/L- Negative Female for AMI  <22.0 ng/L- Negative Male for AMI  >=14 - Abnormal Female indicating possible myocardial injury.  >=22 - Abnormal Male indicating possible myocardial injury.   Clinicians would have to utilize clinical acumen, EKG, Troponin, and serial changes to determine if it is an Acute Myocardial Infarction " or myocardial injury due to an underlying chronic condition.        RAINBOW DRAW    Narrative:     The following orders were created for panel order [place] Draw.  Procedure                               Abnormality         Status                     ---------                               -----------         ------                     Green Top (Gel)[[id number]]                                  Final result               Lavender Top[[id number]]                                     Final result               Gold Top - SST[[id number]]                                   Final result               Light Blue Top[[id number]]                                   Final result                 Please view results for these tests on the individual orders.   HIGH SENSITIVITIY TROPONIN T 1HR   APTT   APTT   CBC WITH AUTO DIFFERENTIAL   CBC WITH AUTO DIFFERENTIAL   CBC AND DIFFERENTIAL    Narrative:     The following orders were created for panel order CBC & Differential.  Procedure                               Abnormality         Status                     ---------                               -----------         ------                     CBC Auto Differential[[id number]]        Abnormal            Final result                 Please view results for these tests on the individual orders.   GREEN TOP   LAVENDER TOP   GOLD TOP - SST   LIGHT BLUE TOP   CBC AND DIFFERENTIAL    Narrative:     The following orders were created for panel order CBC & Differential.  Procedure                               Abnormality         Status                     ---------                               -----------         ------                     CBC Auto Differential[[id number]]                                                         Please view results for these tests on the individual orders.   CBC AND DIFFERENTIAL    Narrative:     The following orders were created for panel order CBC & Differential.  Procedure                                Abnormality         Status                     ---------                               -----------         ------                     CBC Auto Differential[[id number]]                                                         Please view results for these tests on the individual orders.       EKG:   ECG 12 Lead Other; Chest pain and right arm pain   Preliminary Result   HEART RATE=79  bpm   RR Wgrepola=041  ms   CT Zplypdas=504  ms   P Horizontal Axis=7  deg   P Front Axis=54  deg   QRSD Wvvnxrim=346  ms   QT Fmxqeerk=428  ms   JBkX=591  ms   QRS Axis=21  deg   T Wave Axis=27  deg   - ABNORMAL ECG -   Sinus rhythm   Left bundle branch block   Anteroseptal infarct, acute   Prolonged QT interval   Date and Time of Study:2025-02-05 20:47:08          Meds given in ED:   Medications   sodium chloride 0.9 % flush 10 mL (has no administration in time range)   heparin 75756 units/250 mL (100 units/mL) in 0.45 % NaCl infusion (has no administration in time range)   iopamidol (ISOVUE-370) 76 % injection 100 mL (78 mL Intravenous Given 2/5/25 2226)       Imaging results:  CT Angiogram Chest    Result Date: 2/5/2025  Bilateral pulmonary emboli, without evidence of right heart strain or pulmonary infarction.  FINDINGS were called to Dr. Tian at 11:00 p.m.  Radiation dose reduction techniques were utilized, including automated exposure control and exposure modulation based on body size.   This report was finalized on 2/5/2025 11:02 PM by Dr. Georgina Hicks M.D on Workstation: [employer]      XR Chest 1 View    Result Date: 2/5/2025  No acute findings.  This report was finalized on 2/5/2025 9:50 PM by Dr. Georgina Hicks M.D on Workstation: [id number]       Ambulatory status:   - Ad Roxana    Social issues:   Social History     Socioeconomic History    Marital status: Single    Number of children: 2    Years of education: 12+4.5   Tobacco Use    Smoking status: Never     Passive exposure: Never    Smokeless tobacco: Never    Vaping Use    Vaping status: Never Used   Substance and Sexual Activity    Alcohol use: Yes     Comment: Only social at holidays, 1 drink.    Drug use: No    Sexual activity: Yes     Partners: Male     Birth control/protection: Post-menopausal       Peripheral Neurovascular  Peripheral Neurovascular (Adult)  Peripheral Neurovascular WDL: capillary refill, .WDL except, neurovascular assessment lower  Capillary Refill, General: less than/equal to 3 secs  LLE Neurovascular Assessment  Temperature LLE: warm  Color LLE: no discoloration  Sensation LLE: no numbness, no tenderness, no tingling  RLE Neurovascular Assessment  Temperature RLE: warm  Color RLE: no discoloration  Sensation RLE: tenderness present, no numbness, tingling present    Neuro Cognitive  Neuro Cognitive (Adult)  Cognitive/Neuro/Behavioral WDL: WDL, level of consciousness, orientation  Level of Consciousness: Alert  Orientation: oriented x 4    Learning  Learning Assessment  Learning Readiness and Ability: no barriers identified  Education Provided  Person Taught: patient    Respiratory  Respiratory WDL  Respiratory WDL: WDL    Abdominal Pain  Abdominal Pain CPG Interventions  Coping Interventions: anticipatory guidance provided, care explained to patient/family prior to performing  Safety Interventions  Safety Precautions/Falls Reduction: assistive device/personal items within reach  All Alarms: alarm(s) activated and audible    Pain Assessments  Pain (Adult)  (0-10) Pain Rating: Rest: 4  (0-10) Pain Rating: Activity: 4    NIH Stroke Scale       Mary Sainz RN  02/05/25 23:47 EST
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
## 2025-02-06 NOTE — CASE MANAGEMENT/SOCIAL WORK
Discharge Planning Assessment  [facility]     Patient Name: Lala Dejesus  MRN: [id number]  Today's Date: 2/6/2025    Admit Date: 2/5/2025    Plan: Home significant other to transport   Discharge Needs Assessment       Row Name 02/06/25 1443       Living Environment    People in Home alone    Current Living Arrangements home    Able to Return to Prior Arrangements yes       Transition Planning    Patient/Family Anticipates Transition to home with family    Patient/Family Anticipated Services at Transition     Transportation Anticipated family or friend will provide;car, drives self       Discharge Needs Assessment    Equipment Currently Used at Home grab bar    Concerns to be Addressed discharge planning                   Discharge Plan       Row Name 02/06/25 1444       Plan    Plan Home significant other to transport    Patient/Family in Agreement with Plan yes    Plan Comments CCP met with pt and significant other Mustapha at the bedside. Introduced self and role of CCP. Pt gave CCP permission to speak in front of Mustapha. Face sheet information and pharmacy verified. Pt lives alone in a bi-level home with 2-4STE. Pt still drives, IADL's and has grab bars at home. Pt denies having a living. Pt is enrolled in meds in beds and states sometimes she has trouble affording medications. Pt denies HH or SNF history. DC plan is to return home, Mustapha to transport. [facility] STILL/CCP                  Continued Care and Services - Admitted Since 2/5/2025    No active coordination exists for this encounter.          Demographic Summary       Row Name 02/06/25 1443       General Information    Admission Type observation    Arrived From home    Referral Source case finding;admission list    Reason for Consult discharge planning    Preferred Language English       Contact Information    Permission Granted to Share Info With                    Functional Status       Row Name 02/06/25 1443       Functional  Status    Usual Activity Tolerance excellent    Current Activity Tolerance good       Assessment of Health Literacy    How often do you have someone help you read hospital materials? Never    How often do you have problems learning about your medical condition because of difficulty understanding written information? Never    How often do you have a problem understanding what is told to you about your medical condition? Never    How confident are you filling out medical forms by yourself? Extremely    Health Literacy Excellent       Functional Status, IADL    Medications independent    Meal Preparation independent    Housekeeping independent    Laundry independent    Shopping independent                               Gaby Woods RN

## 2025-02-06 NOTE — PLAN OF CARE
Problem: Adult Inpatient Plan of Care  Goal: Plan of Care Review  Outcome: Progressing   Goal Outcome Evaluation:  Pt to ED with c/o rt leg pain and swelling. CTA showed bilateral PE. Heparin gtt infusing. A&Ox4. VSS. RA. SR. Pt rested throughout the night with no other issues or complaints.

## 2025-02-07 LAB
ANION GAP SERPL CALCULATED.3IONS-SCNC: 9 MMOL/L (ref 5–15)
APTT PPP: 101.3 SECONDS (ref 22.7–35.4)
APTT PPP: 110.3 SECONDS (ref 22.7–35.4)
APTT PPP: 93.4 SECONDS (ref 22.7–35.4)
AT III PPP CHRO-ACNC: 101 % (ref 90–134)
B2 GLYCOPROT1 IGA SER-ACNC: <9 GPI IGA UNITS (ref 0–25)
B2 GLYCOPROT1 IGG SER-ACNC: <9 GPI IGG UNITS (ref 0–20)
B2 GLYCOPROT1 IGM SER-ACNC: 10 GPI IGM UNITS (ref 0–32)
BASOPHILS # BLD AUTO: 0.03 10*3/MM3 (ref 0–0.2)
BASOPHILS NFR BLD AUTO: 0.3 % (ref 0–1.5)
BUN SERPL-MCNC: 9 MG/DL (ref 8–23)
BUN/CREAT SERPL: 13 (ref 7–25)
CALCIUM SPEC-SCNC: 8.6 MG/DL (ref 8.6–10.5)
CARDIOLIPIN IGA SER IA-ACNC: <9 APL U/ML (ref 0–11)
CARDIOLIPIN IGG SER IA-ACNC: <9 GPL U/ML (ref 0–14)
CARDIOLIPIN IGM SER IA-ACNC: 12 MPL U/ML (ref 0–12)
CHLORIDE SERPL-SCNC: 105 MMOL/L (ref 98–107)
CO2 SERPL-SCNC: 22 MMOL/L (ref 22–29)
CREAT SERPL-MCNC: 0.69 MG/DL (ref 0.57–1)
DEPRECATED RDW RBC AUTO: 37.6 FL (ref 37–54)
EGFRCR SERPLBLD CKD-EPI 2021: 98.3 ML/MIN/1.73
EOSINOPHIL # BLD AUTO: 0.13 10*3/MM3 (ref 0–0.4)
EOSINOPHIL NFR BLD AUTO: 1.4 % (ref 0.3–6.2)
ERYTHROCYTE [DISTWIDTH] IN BLOOD BY AUTOMATED COUNT: 12.1 % (ref 12.3–15.4)
F5 GENE MUT ANL BLD/T: NORMAL
FACTOR II, DNA ANALYSIS: NORMAL
GLUCOSE BLDC GLUCOMTR-MCNC: 143 MG/DL (ref 70–130)
GLUCOSE SERPL-MCNC: 140 MG/DL (ref 65–99)
HCT VFR BLD AUTO: 35.7 % (ref 34–46.6)
HGB BLD-MCNC: 12.1 G/DL (ref 12–15.9)
IMM GRANULOCYTES # BLD AUTO: 0.03 10*3/MM3 (ref 0–0.05)
IMM GRANULOCYTES NFR BLD AUTO: 0.3 % (ref 0–0.5)
LYMPHOCYTES # BLD AUTO: 3.12 10*3/MM3 (ref 0.7–3.1)
LYMPHOCYTES NFR BLD AUTO: 32.6 % (ref 19.6–45.3)
MAGNESIUM SERPL-MCNC: 2.1 MG/DL (ref 1.6–2.4)
MCH RBC QN AUTO: 29.4 PG (ref 26.6–33)
MCHC RBC AUTO-ENTMCNC: 33.9 G/DL (ref 31.5–35.7)
MCV RBC AUTO: 86.7 FL (ref 79–97)
MONOCYTES # BLD AUTO: 1.02 10*3/MM3 (ref 0.1–0.9)
MONOCYTES NFR BLD AUTO: 10.6 % (ref 5–12)
NEUTROPHILS NFR BLD AUTO: 5.25 10*3/MM3 (ref 1.7–7)
NEUTROPHILS NFR BLD AUTO: 54.8 % (ref 42.7–76)
NRBC BLD AUTO-RTO: 0 /100 WBC (ref 0–0.2)
PLATELET # BLD AUTO: 247 10*3/MM3 (ref 140–450)
PMV BLD AUTO: 10.3 FL (ref 6–12)
POTASSIUM SERPL-SCNC: 4 MMOL/L (ref 3.5–5.2)
POTASSIUM SERPL-SCNC: 4 MMOL/L (ref 3.5–5.2)
PROT C ACT/NOR PPP: 114 % (ref 86–163)
PROT S ACT/NOR PPP: 74 % (ref 70–127)
PROT S FREE PPP-ACNC: 85 % (ref 49–138)
RBC # BLD AUTO: 4.12 10*6/MM3 (ref 3.77–5.28)
SODIUM SERPL-SCNC: 136 MMOL/L (ref 136–145)
WBC NRBC COR # BLD AUTO: 9.58 10*3/MM3 (ref 3.4–10.8)

## 2025-02-07 PROCEDURE — 82948 REAGENT STRIP/BLOOD GLUCOSE: CPT

## 2025-02-07 PROCEDURE — 84132 ASSAY OF SERUM POTASSIUM: CPT | Performed by: INTERNAL MEDICINE

## 2025-02-07 PROCEDURE — G0378 HOSPITAL OBSERVATION PER HR: HCPCS

## 2025-02-07 PROCEDURE — 25010000002 HEPARIN (PORCINE) 25000-0.45 UT/250ML-% SOLUTION: Performed by: INTERNAL MEDICINE

## 2025-02-07 PROCEDURE — 85730 THROMBOPLASTIN TIME PARTIAL: CPT | Performed by: INTERNAL MEDICINE

## 2025-02-07 PROCEDURE — 83735 ASSAY OF MAGNESIUM: CPT | Performed by: INTERNAL MEDICINE

## 2025-02-07 PROCEDURE — 80048 BASIC METABOLIC PNL TOTAL CA: CPT | Performed by: INTERNAL MEDICINE

## 2025-02-07 PROCEDURE — 85025 COMPLETE CBC W/AUTO DIFF WBC: CPT | Performed by: INTERNAL MEDICINE

## 2025-02-07 PROCEDURE — 99232 SBSQ HOSP IP/OBS MODERATE 35: CPT | Performed by: INTERNAL MEDICINE

## 2025-02-07 PROCEDURE — 96366 THER/PROPH/DIAG IV INF ADDON: CPT

## 2025-02-07 RX ADMIN — Medication 10 ML: at 08:57

## 2025-02-07 RX ADMIN — DOXYCYCLINE 50 MG: 25 FOR SUSPENSION ORAL at 08:54

## 2025-02-07 RX ADMIN — DAPAGLIFLOZIN AND METFORMIN HYDROCHLORIDE 1 TABLET: 10; 1000 TABLET, FILM COATED, EXTENDED RELEASE ORAL at 21:18

## 2025-02-07 RX ADMIN — ACETAMINOPHEN 325MG 650 MG: 325 TABLET ORAL at 05:18

## 2025-02-07 RX ADMIN — HEPARIN SODIUM 13.9 UNITS/KG/HR: 10000 INJECTION, SOLUTION INTRAVENOUS at 13:24

## 2025-02-07 RX ADMIN — ACETAMINOPHEN 325MG 650 MG: 325 TABLET ORAL at 21:15

## 2025-02-07 RX ADMIN — BROMFENAC SODIUM 1 DROP: 0.7 SOLUTION/ DROPS OPHTHALMIC at 08:57

## 2025-02-07 RX ADMIN — Medication 1 TABLET: at 08:53

## 2025-02-07 RX ADMIN — CETIRIZINE HYDROCHLORIDE 5 MG: 10 TABLET, FILM COATED ORAL at 08:53

## 2025-02-07 RX ADMIN — Medication 10 ML: at 21:16

## 2025-02-07 RX ADMIN — Medication 400 MCG: at 08:53

## 2025-02-07 RX ADMIN — ROSUVASTATIN CALCIUM 10 MG: 5 TABLET, FILM COATED ORAL at 08:53

## 2025-02-07 RX ADMIN — DOXYCYCLINE 50 MG: 25 FOR SUSPENSION ORAL at 21:16

## 2025-02-07 RX ADMIN — ATROPINE SULFATE 1 DROP: 10 SOLUTION/ DROPS OPHTHALMIC at 16:53

## 2025-02-07 RX ADMIN — BENZONATATE 200 MG: 100 CAPSULE ORAL at 21:29

## 2025-02-07 RX ADMIN — DORZOLAMIDE HYDROCHLORIDE AND TIMOLOL MALEATE 1 DROP: 20; 5 SOLUTION/ DROPS OPHTHALMIC at 08:55

## 2025-02-07 RX ADMIN — ATROPINE SULFATE 1 DROP: 10 SOLUTION/ DROPS OPHTHALMIC at 08:56

## 2025-02-07 RX ADMIN — BRIMONIDINE TARTRATE 1 DROP: 1 SOLUTION/ DROPS OPHTHALMIC at 08:57

## 2025-02-07 RX ADMIN — ACETAMINOPHEN 325MG 650 MG: 325 TABLET ORAL at 13:33

## 2025-02-07 NOTE — PROGRESS NOTES
LOS: 3 days   Patient Care Team:  Varghese Borden MD as PCP - General  Varghese Borden MD as PCP - Family Medicine    Chief Complaint:   Chief Complaint   Patient presents with    Leg Pain         Subjective    Today the patient is awake, alert, and oriented.  She seems to be tolerating her heparin without any difficulty.  She is not having much in the way of pain or discomfort in her right lower extremity at this time.  She understands the workup that is ongoing to evaluate her possible clotting disorder.  Hematology recommends continuing the heparin for now.  I think it would be acceptable for her to get up to the chair is much as she would like and to use the bedside commode but otherwise she is aware she should not be getting up and walking around just yet.  Otherwise she seems to be stable and doing well.    Interval History:     Patient Complaints: Overall she has very few complaints.  She would like to get up to the chair and use a bedside commode since she feels restrained in bed.  Patient Denies: She denies shortness of breath or chest pain, no abdominal pain or nausea, and no significant pain in her right lower extremity.  History taken from: patient chart    Review of Systems:    Overall her review of systems is surprisingly benign.  As noted she is awake, alert, and oriented.  She is tolerating the heparin without difficulty.  She is eating and drinking well.  She has no new complaints that she reports.  She would like to get out of bed to the chair and use a bedside commode but beyond that she understands the need for rest and time to stabilize the blood clot with the heparin.  Her other medical conditions seem to be under relatively good control.    Objective      Vital Signs  Temp:  [98 °F (36.7 °C)-99.2 °F (37.3 °C)] 98.9 °F (37.2 °C)  Pulse:  [] 109  Resp:  [18] 18  BP: (111-154)/(55-97) 154/97    I/O'S  Intake & Output (last 7 days)         01/31 0701  02/01 0700 02/01 0701 02/02  0700 02/02 0701 02/03 0700 02/03 0701 02/04 0700 02/04 0701 02/05 0700 02/05 0701 02/06 0700 02/06 0701 02/07 0700 02/07 0701 02/08 0700                Urine Unmeasured Occurrence       3 x             Physical Exam:   General Appearance: alert, pleasant, appears stated age, interactive, cooperative, and in no acute distress.  She is clearly very comfortable and very cooperative is well.  Lungs: clear to auscultation, respirations regular, respirations even, and respirations unlabored  Heart: regular rhythm & normal rate  Abdomen: normal bowel sounds, soft non-tender, no guarding, and no rebound tenderness  Extremities: no edema, no cyanosis, no redness, and she has surprisingly little discomfort in her right lower extremity despite the extensive nature of her blood clot.     Results Review:     I reviewed the patient's new clinical results.  I reviewed the patient's other test results and agree with the interpretation                     Results from last 7 days   Lab Units 02/07/25  0408 02/06/25  0906 02/06/25  0616 02/05/25  2100   SODIUM mmol/L 136 138 138 141   POTASSIUM mmol/L 4.0 3.9 3.9 3.9   CHLORIDE mmol/L 105 106 104 104   CO2 mmol/L 22.0 24.1 25.4 27.4   BUN mg/dL 9 9 10 12   CREATININE mg/dL 0.69 0.67 0.75 0.73   GLUCOSE mg/dL 140* 167* 226* 156*   CALCIUM mg/dL 8.6 8.5* 8.7 9.0         Results from last 7 days   Lab Units 02/07/25  0408 02/06/25  0906 02/06/25  0616 02/05/25  2100   WBC 10*3/mm3 9.58 9.28 9.47 10.29   HEMOGLOBIN g/dL 12.1 11.7* 11.9* 13.1   HEMATOCRIT % 35.7 36.8 35.0 38.8   PLATELETS 10*3/mm3 247 216 208 209     Results from last 7 days   Lab Units 02/07/25  0828 02/06/25  0611 02/05/25  2100   INR   --   --  1.10   APTT seconds 110.3* 70.9* 30.7     Results from last 7 days   Lab Units 02/06/25  0616   MAGNESIUM mg/dL 2.3     Results from last 7 days   Lab Units 02/06/25  0616   CHOLESTEROL mg/dL 177   TRIGLYCERIDES mg/dL 96   HDL CHOL mg/dL 51   LDL CHOL mg/dL 108*                Lab Results   Component Value Date    HGBA1C 8.50 (H) 02/06/2025    HGBA1C 6.59 (H) 06/08/2017    HGBA1C 6.56 (H) 02/08/2017    HGBA1C 6.71 (H) 12/09/2016    HGBA1C 6.30 (H) 11/07/2016    HGBA1C 6.5 (H) 05/09/2016     Glucose   Date/Time Value Ref Range Status   02/06/2025 0642 201 (H) 70 - 130 mg/dL Final       eGFR   Date Value Ref Range Status   02/07/2025 98.3 >60.0 mL/min/1.73 Final   02/06/2025 99.0 >60.0 mL/min/1.73 Final   02/06/2025 90.1 >60.0 mL/min/1.73 Final   02/05/2025 93.1 >60.0 mL/min/1.73 Final           Medication Review:   Scheduled Meds:atropine, 1 drop, Both Eyes, TID  brimonidine, 1 drop, Both Eyes, BID  calcium 500 mg vitamin D 5 mcg (200 UT), 1 tablet, Oral, Daily  cetirizine, 5 mg, Oral, BID  dapagliflozin-metformin HCl ER, 1 tablet, Oral, Daily Before Supper  dorzolamide-timolol, 1 drop, Both Eyes, BID  doxycycline, 50 mg, Oral, BID  folic acid, 400 mcg, Oral, Daily  Lifitegrast, 1 drop, Both Eyes, Q48H  methotrexate, 20 mg, Oral, Weekly  multivitamin, 1 tablet, Oral, Daily  Bromfenac Sodium, 1 drop, Both Eyes, Q AM  Netarsudil-Latanoprost, 1 drop, Both Eyes, Nightly  rosuvastatin, 10 mg, Oral, Daily  sodium chloride, 10 mL, Intravenous, Q12H      Continuous Infusions:heparin, 15.9 Units/kg/hr, Last Rate: 13.9 Units/kg/hr (02/07/25 1011)      PRN Meds:.  acetaminophen    albuterol    benzonatate    senna-docusate sodium **AND** polyethylene glycol **AND** bisacodyl **AND** bisacodyl    ondansetron ODT    [COMPLETED] Insert Peripheral IV **AND** sodium chloride    sodium chloride    sodium chloride                Bilateral pulmonary embolism    Hyperglycemia due to diabetes mellitus    Autoimmune disease    Hyperlipidemia    MARIA TERESA (generalized anxiety disorder)    Acute deep vein thrombosis (DVT) of femoral vein of right lower extremity    #1 DVT/PE-the Doppler scan reveals extensive DVT of her right lower extremity.  She remains on heparin and seems to be tolerating it well.  She has no  pain or discomfort or even much edema on the right lower extremity.  For now she remains on heparin at hematology's recommendation I will likely switch over to Coumadin eventually.  She continues to be without chest pain or respiratory symptoms of any sort.    2.  Nonspecific autoimmune disease-exactly what she has is not clear although lupus has been speculated in the past.  That certainly could create an hypercoagulable state which is being worked up by hematology at this point.  A number of studies have been ordered and most are still outstanding.    3.  Diabetes mellitus type 2-her hemoglobin is surprisingly high at 8.5 but her blood sugars do seem a little better controlled today.  She is on a consistent carbohydrate diet and getting her medications regularly.  Will continue to follow this closely.  I have ordered before meals and at bedtime blood sugars.    4.  Hyperlipidemia-her cholesterol is good but her LDL is slightly elevated at 108    5.  Anxiety-this only does primarily job-related.  At this point her mood seems to be good and she seems to be very relaxed in general.  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    6.  Moderate obesity-clearly would be to her advantage to watch her diet more closely and to get exercise.  Currently she is not getting any exercise at all but after having a DVT of this size she needs to seriously consider getting into the habit of walking each day.        Plan for disposition:Where: home    Varghese Borden MD  02/07/25  12:04 EST          Time:

## 2025-02-07 NOTE — PROGRESS NOTES
LOS: 0 days   Patient Care Team:  Martin Vilchis MD as PCP - General (Internal Medicine)    Chief Complaint: Bilateral pulmonary emboli, right leg DVT.    Interval History:  2/7/2025 patient reports right leg edema slightly better.  No chest pain dyspnea.  Denies bleeding.    A comprehensive 14 point review of systems was performed and was negative except as mentioned.    Vital Signs:  Temp:  [97.9 °F (36.6 °C)-98.6 °F (37 °C)] 97.9 °F (36.6 °C)  Heart Rate:  [78-85] 78  Resp:  [16] 16  BP: (128-137)/(75-81) 135/75  No intake or output data in the 24 hours ending 02/07/25 1211    Physical Exam:   General Appearance:    No acute distress, alert and oriented x4   Lungs:     Clear to auscultation bilaterally     Heart:    Regular rhythm and normal rate.     Abdomen:     Soft, non-tender, non-distended.    Extremities:   No clubbing, cyanosis, or edema.     Results Review:   CBC:      Lab 02/07/25  0408 02/06/25  0906 02/06/25  0616 02/05/25  2100   WBC 9.58 9.28 9.47 10.29   HEMOGLOBIN 12.1 11.7* 11.9* 13.1   HEMATOCRIT 35.7 36.8 35.0 38.8   PLATELETS 247 216 208 209   NEUTROS ABS 5.25 4.90 4.84 5.23   IMMATURE GRANS (ABS) 0.03 0.01 0.02 0.03   LYMPHS ABS 3.12* 3.33* 3.59* 3.79*   MONOS ABS 1.02* 0.93* 0.89 1.14*   EOS ABS 0.13 0.09 0.11 0.08   MCV 86.7 89.3 86.6 87.0     CMP:        Lab 02/07/25  0408 02/06/25  0906 02/06/25  0616 02/05/25  2100   SODIUM 136 138 138 141   POTASSIUM 4.0 3.9 3.9 3.9   CHLORIDE 105 106 104 104   CO2 22.0 24.1 25.4 27.4   ANION GAP 9.0 7.9 8.6 9.6   BUN 9 9 10 12   CREATININE 0.69 0.67 0.75 0.73   EGFR 98.3 99.0 90.1 93.1   GLUCOSE 140* 167* 226* 156*   CALCIUM 8.6 8.5* 8.7 9.0   MAGNESIUM  --   --  2.3  --    TOTAL PROTEIN  --   --  7.4 8.3   ALBUMIN  --   --  3.1* 3.9   GLOBULIN  --   --  4.3 4.4   ALT (SGPT)  --   --  10 12   AST (SGOT)  --   --  12 13   BILIRUBIN  --   --  0.4 0.5   ALK PHOS  --   --  114 131*     Component      Latest Ref Rn 2/6/2025   Factor V Leiden       Normal  Normal    Factor II, DNA Analysis      Normal  Normal    ANTITHROMBIN ACTIVITY      90 - 134 % 101    Protein C Activity      86 - 163 % 114    Protein S Functional      70 - 127 % 74    Protein S Ag, Free      49.0 - 138.0 % 85.0          Results from last 7 days   Lab Units 02/05/25  2331 02/05/25  2100   HSTROP T ng/L <6 6     Results from last 7 days   Lab Units 02/07/25  0828 02/06/25  0611 02/05/25  2100   INR   --   --  1.10   APTT seconds 110.3* 70.9* 30.7     Results from last 7 days   Lab Units 02/06/25  0616   CHOLESTEROL mg/dL 177     Results from last 7 days   Lab Units 02/06/25  0616   MAGNESIUM mg/dL 2.3     Results from last 7 days   Lab Units 02/06/25  0616   CHOLESTEROL mg/dL 177   TRIGLYCERIDES mg/dL 96   HDL CHOL mg/dL 51   LDL CHOL mg/dL 108*       I reviewed the patient's new clinical results.        Assessment:    1. Bilateral pulmonary emboli and significant right leg DVT.   Both acute and symptomatic.   This patient has attentive lifestyle, and slightly overweight.  Otherwise no apparent trigger reason.    She does have nonspecified autoimmune disorder which may be related.    The patient has been on IV heparin anticoagulation which I agree with.   2/6/2025 I discussed with the patient, recommended comprehensive workup for laboratory studies for hypercoagulable status with both primary and secondary. With her history of nonspecific autoimmune disorder, I think the possibility of antiphospholipid antibody syndrome would be high. We will have comprehensive antibody studies including antiphosphatidylserine antibodies, anticardiolipin antibodies, antibeta-2 GP1 antibodies, antiphosphatidic acid antibodies and antiphosphatidylethalnolamine antibodies. Nevertheless I think we should still check primary hypercoagulable condition by checking her factor II mutation and factor V Leiden mutation, as well as protein C activity, protein S activity and protein S free antigen, and antithrombin  activity.   2/6/2025 I discussed with the patient for the time of anticoagulation and this is hard to decide at this point but at least she will need 1 year treatment. Certainly she will need to be reassessed with chest angiogram study and venous Doppler study for the leg probably in 3 months if her symptoms are improving. The exact time of treatment will depend on the laboratory study as well as resolution of the thrombosis. She voiced understanding with that.   2/7/2025 patient reports tolerating heparin anticoagulation no bleeding.  Right leg edema is slightly better.  Laboratory study was negative for factor II and factor V Leyden mutation.  Normal protein C activity protein S activity and Antithrombin activity.      2. Non-specified autoimmune disorder. The patient has been taking methotrexate 8 tablets every Wednesday which equals 20 mg weekly. She missed her dose yesterday so I think she should be given the methotrexate 20 mg on 2/6/2025.  This will be repeated weekly.      3. Diabetes and hyperlipidemia management per primary team.     4. Moderate obesity. This is also a risk effect for her thrombosis.      PLAN:  Continue heparin anticoagulation for now.  Pending laboratory results for antiphospholipid antibodies.  Patient is negative for primary hypercoagulable workup.  If the patient has improved symptomatology, she likely would need to be switched to Lovenox weight based subcu injection every 12 hours until we figure out her laboratory studies. According to expert opinion, patient having antiphospholipid syndrome would be better treated with Coumadin instead of NOAC.   I will set the patient for follow-up with me as outpatient in 3 to 4 weeks to discuss results and ongoing treatment.        I discussed with the patient about laboratory results and further management plan.  Patient voiced understanding and agreeable.    Reviewed note from primary team Dr. Borden.      Sally Manzano MD  PhD  02/07/25  12:11 EST

## 2025-02-07 NOTE — PLAN OF CARE
Problem: Adult Inpatient Plan of Care  Goal: Plan of Care Review  Outcome: Progressing   Goal Outcome Evaluation:  VSS. RA. SR. A&Ox4. PRN tylenol given for leg pain, see mar. Heparin gtt infusing. Bedrest. Pt rested throughout the night with no other issues or complaints.

## 2025-02-08 LAB
ANION GAP SERPL CALCULATED.3IONS-SCNC: 11.9 MMOL/L (ref 5–15)
APTT PPP: 65.7 SECONDS (ref 22.7–35.4)
APTT PPP: 75.7 SECONDS (ref 22.7–35.4)
APTT PPP: 83.2 SECONDS (ref 22.7–35.4)
BASOPHILS # BLD AUTO: 0.02 10*3/MM3 (ref 0–0.2)
BASOPHILS NFR BLD AUTO: 0.2 % (ref 0–1.5)
BUN SERPL-MCNC: 10 MG/DL (ref 8–23)
BUN/CREAT SERPL: 14.1 (ref 7–25)
CALCIUM SPEC-SCNC: 9.2 MG/DL (ref 8.6–10.5)
CHLORIDE SERPL-SCNC: 101 MMOL/L (ref 98–107)
CO2 SERPL-SCNC: 24.1 MMOL/L (ref 22–29)
CREAT SERPL-MCNC: 0.71 MG/DL (ref 0.57–1)
DEPRECATED RDW RBC AUTO: 39.9 FL (ref 37–54)
EGFRCR SERPLBLD CKD-EPI 2021: 96.3 ML/MIN/1.73
EOSINOPHIL # BLD AUTO: 0.09 10*3/MM3 (ref 0–0.4)
EOSINOPHIL NFR BLD AUTO: 0.8 % (ref 0.3–6.2)
ERYTHROCYTE [DISTWIDTH] IN BLOOD BY AUTOMATED COUNT: 12.4 % (ref 12.3–15.4)
GLUCOSE BLDC GLUCOMTR-MCNC: 129 MG/DL (ref 70–130)
GLUCOSE BLDC GLUCOMTR-MCNC: 152 MG/DL (ref 70–130)
GLUCOSE BLDC GLUCOMTR-MCNC: 177 MG/DL (ref 70–130)
GLUCOSE BLDC GLUCOMTR-MCNC: 177 MG/DL (ref 70–130)
GLUCOSE BLDC GLUCOMTR-MCNC: 192 MG/DL (ref 70–130)
GLUCOSE SERPL-MCNC: 199 MG/DL (ref 65–99)
HCT VFR BLD AUTO: 39.9 % (ref 34–46.6)
HGB BLD-MCNC: 12.8 G/DL (ref 12–15.9)
IMM GRANULOCYTES # BLD AUTO: 0.04 10*3/MM3 (ref 0–0.05)
IMM GRANULOCYTES NFR BLD AUTO: 0.4 % (ref 0–0.5)
LYMPHOCYTES # BLD AUTO: 3.31 10*3/MM3 (ref 0.7–3.1)
LYMPHOCYTES NFR BLD AUTO: 30.3 % (ref 19.6–45.3)
MCH RBC QN AUTO: 28.7 PG (ref 26.6–33)
MCHC RBC AUTO-ENTMCNC: 32.1 G/DL (ref 31.5–35.7)
MCV RBC AUTO: 89.5 FL (ref 79–97)
MONOCYTES # BLD AUTO: 0.8 10*3/MM3 (ref 0.1–0.9)
MONOCYTES NFR BLD AUTO: 7.3 % (ref 5–12)
NEUTROPHILS NFR BLD AUTO: 6.67 10*3/MM3 (ref 1.7–7)
NEUTROPHILS NFR BLD AUTO: 61 % (ref 42.7–76)
NRBC BLD AUTO-RTO: 0 /100 WBC (ref 0–0.2)
PLATELET # BLD AUTO: 302 10*3/MM3 (ref 140–450)
PMV BLD AUTO: 10.1 FL (ref 6–12)
POTASSIUM SERPL-SCNC: 3.7 MMOL/L (ref 3.5–5.2)
QT INTERVAL: 414 MS
QTC INTERVAL: 544 MS
RBC # BLD AUTO: 4.46 10*6/MM3 (ref 3.77–5.28)
SODIUM SERPL-SCNC: 137 MMOL/L (ref 136–145)
WBC NRBC COR # BLD AUTO: 10.93 10*3/MM3 (ref 3.4–10.8)

## 2025-02-08 PROCEDURE — 93010 ELECTROCARDIOGRAM REPORT: CPT | Performed by: INTERNAL MEDICINE

## 2025-02-08 PROCEDURE — 93005 ELECTROCARDIOGRAM TRACING: CPT | Performed by: INTERNAL MEDICINE

## 2025-02-08 PROCEDURE — G0378 HOSPITAL OBSERVATION PER HR: HCPCS

## 2025-02-08 PROCEDURE — 85730 THROMBOPLASTIN TIME PARTIAL: CPT | Performed by: INTERNAL MEDICINE

## 2025-02-08 PROCEDURE — 85025 COMPLETE CBC W/AUTO DIFF WBC: CPT | Performed by: INTERNAL MEDICINE

## 2025-02-08 PROCEDURE — 96366 THER/PROPH/DIAG IV INF ADDON: CPT

## 2025-02-08 PROCEDURE — 80048 BASIC METABOLIC PNL TOTAL CA: CPT | Performed by: INTERNAL MEDICINE

## 2025-02-08 PROCEDURE — 82948 REAGENT STRIP/BLOOD GLUCOSE: CPT

## 2025-02-08 PROCEDURE — 25010000002 HEPARIN (PORCINE) 25000-0.45 UT/250ML-% SOLUTION: Performed by: INTERNAL MEDICINE

## 2025-02-08 PROCEDURE — 99232 SBSQ HOSP IP/OBS MODERATE 35: CPT | Performed by: INTERNAL MEDICINE

## 2025-02-08 RX ORDER — METOPROLOL SUCCINATE 25 MG/1
12.5 TABLET, EXTENDED RELEASE ORAL
Status: DISCONTINUED | OUTPATIENT
Start: 2025-02-08 | End: 2025-02-10

## 2025-02-08 RX ORDER — TRAMADOL HYDROCHLORIDE 50 MG/1
50 TABLET ORAL EVERY 6 HOURS PRN
Status: DISCONTINUED | OUTPATIENT
Start: 2025-02-08 | End: 2025-02-12 | Stop reason: HOSPADM

## 2025-02-08 RX ADMIN — Medication 1 TABLET: at 09:18

## 2025-02-08 RX ADMIN — BRIMONIDINE TARTRATE 1 DROP: 1 SOLUTION/ DROPS OPHTHALMIC at 09:13

## 2025-02-08 RX ADMIN — ROSUVASTATIN CALCIUM 10 MG: 5 TABLET, FILM COATED ORAL at 09:18

## 2025-02-08 RX ADMIN — Medication 10 ML: at 21:40

## 2025-02-08 RX ADMIN — Medication 1 TABLET: at 09:20

## 2025-02-08 RX ADMIN — Medication 10 ML: at 09:19

## 2025-02-08 RX ADMIN — TRAMADOL HYDROCHLORIDE 50 MG: 50 TABLET ORAL at 16:06

## 2025-02-08 RX ADMIN — ATROPINE SULFATE 1 DROP: 10 SOLUTION/ DROPS OPHTHALMIC at 09:13

## 2025-02-08 RX ADMIN — ACETAMINOPHEN 325MG 650 MG: 325 TABLET ORAL at 06:28

## 2025-02-08 RX ADMIN — ACETAMINOPHEN 325MG 650 MG: 325 TABLET ORAL at 21:38

## 2025-02-08 RX ADMIN — DORZOLAMIDE HYDROCHLORIDE AND TIMOLOL MALEATE 1 DROP: 20; 5 SOLUTION/ DROPS OPHTHALMIC at 09:13

## 2025-02-08 RX ADMIN — Medication 400 MCG: at 09:18

## 2025-02-08 RX ADMIN — METOPROLOL SUCCINATE 12.5 MG: 25 TABLET, EXTENDED RELEASE ORAL at 21:38

## 2025-02-08 RX ADMIN — BROMFENAC SODIUM 1 DROP: 0.7 SOLUTION/ DROPS OPHTHALMIC at 09:12

## 2025-02-08 RX ADMIN — DOXYCYCLINE 50 MG: 25 FOR SUSPENSION ORAL at 09:19

## 2025-02-08 RX ADMIN — ATROPINE SULFATE 1 DROP: 10 SOLUTION/ DROPS OPHTHALMIC at 16:07

## 2025-02-08 RX ADMIN — DOXYCYCLINE 50 MG: 25 FOR SUSPENSION ORAL at 22:22

## 2025-02-08 RX ADMIN — ATROPINE SULFATE 1 DROP: 10 SOLUTION/ DROPS OPHTHALMIC at 22:28

## 2025-02-08 RX ADMIN — BRIMONIDINE TARTRATE 1 DROP: 1 SOLUTION/ DROPS OPHTHALMIC at 22:28

## 2025-02-08 RX ADMIN — HEPARIN SODIUM 13.9 UNITS/KG/HR: 10000 INJECTION, SOLUTION INTRAVENOUS at 10:38

## 2025-02-08 RX ADMIN — DAPAGLIFLOZIN AND METFORMIN HYDROCHLORIDE 1 TABLET: 10; 1000 TABLET, FILM COATED, EXTENDED RELEASE ORAL at 21:40

## 2025-02-08 RX ADMIN — DORZOLAMIDE HYDROCHLORIDE AND TIMOLOL MALEATE 1 DROP: 20; 5 SOLUTION/ DROPS OPHTHALMIC at 22:28

## 2025-02-08 NOTE — PROGRESS NOTES
LOS: 0 days   Patient Care Team:  Martin Vilchis MD as PCP - General (Internal Medicine)    Chief Complaint: Bilateral pulmonary emboli, right leg DVT.    Interval History:  2/7/2025 patient reports right leg edema slightly better.  No chest pain dyspnea.  Denies bleeding.    2/8/2025 patient reports the right leg swelling is better however when she is trying to walk she still has significant pain in the right leg.  She denies bleeding or bruising.     A comprehensive 14 point review of systems was performed and was negative except as mentioned.    Vital Signs:  Temp:  [98 °F (36.7 °C)-98.8 °F (37.1 °C)] 98.6 °F (37 °C)  Heart Rate:  [79-99] 84  Resp:  [18] 18  BP: (117-144)/(71-83) 135/71    Intake/Output Summary (Last 24 hours) at 2/8/2025 1225  Last data filed at 2/7/2025 2120  Gross per 24 hour   Intake 220 ml   Output --   Net 220 ml       Physical Exam:   GENERAL:  Well-developed, well-nourished in no acute distress.    SKIN:  Warm, dry without rashes, purpura or petechiae.  HEENT:  Normocephalic.   CHEST: Normal respiratory effort.  Lungs clear to auscultation. Good airflow.  CARDIAC:  Regular rate and rhythm. Normal S1,S2.  ABDOMEN:  Soft, no tender.  Bowel sounds normal.  EXTREMITIES: Right leg edema 1+.  Moderate calf tenderness.        Results Review:   CBC:      Lab 02/08/25  0551 02/07/25  0408 02/06/25  0906 02/06/25  0616 02/05/25  2100   WBC 10.93* 9.58 9.28 9.47 10.29   HEMOGLOBIN 12.8 12.1 11.7* 11.9* 13.1   HEMATOCRIT 39.9 35.7 36.8 35.0 38.8   PLATELETS 302 247 216 208 209   NEUTROS ABS 6.67 5.25 4.90 4.84 5.23   IMMATURE GRANS (ABS) 0.04 0.03 0.01 0.02 0.03   LYMPHS ABS 3.31* 3.12* 3.33* 3.59* 3.79*   MONOS ABS 0.80 1.02* 0.93* 0.89 1.14*   EOS ABS 0.09 0.13 0.09 0.11 0.08   MCV 89.5 86.7 89.3 86.6 87.0     CMP:        Lab 02/08/25  0551 02/07/25  2309 02/07/25  0408 02/06/25  1139 02/06/25  0906 02/06/25  0616 02/05/25  2100   SODIUM 137  --  136  --  138 138 141   POTASSIUM 3.7 4.0 4.0  --   3.9 3.9 3.9   CHLORIDE 101  --  105  --  106 104 104   CO2 24.1  --  22.0  --  24.1 25.4 27.4   ANION GAP 11.9  --  9.0  --  7.9 8.6 9.6   BUN 10  --  9  --  9 10 12   CREATININE 0.71  --  0.69  --  0.67 0.75 0.73   EGFR 96.3  --  98.3  --  99.0 90.1 93.1   GLUCOSE 199*  --  140*  --  167* 226* 156*   CALCIUM 9.2  --  8.6  --  8.5* 8.7 9.0   MAGNESIUM  --  2.1  --   --   --  2.3  --    TOTAL PROTEIN  --   --   --   --   --  7.4 8.3   ALBUMIN  --   --   --   --   --  3.1* 3.9   GLOBULIN  --   --   --   --   --  4.3 4.4   ALT (SGPT)  --   --   --   --   --  10 12   AST (SGOT)  --   --   --   --   --  12 13   BILIRUBIN  --   --   --   --   --  0.4 0.5   BETA 2 GLYCO 1 IGG  --   --   --  <9  --   --   --    BETA 2 GLYCO 1 IGA  --   --   --  <9  --   --   --    BETA 2 GLYCO 1 IGM  --   --   --  10  --   --   --    ALK PHOS  --   --   --   --   --  114 131*     Component      [facility] 2/6/2025   Factor V Leiden      Normal  Normal    Factor II, DNA Analysis      Normal  Normal    ANTITHROMBIN ACTIVITY      90 - 134 % 101    Protein C Activity      86 - 163 % 114    Protein S Functional      70 - 127 % 74    Protein S Ag, Free      49.0 - 138.0 % 85.0      Component      Latest Ref Rn 2/6/2025   Anticardiolipin IgG      0 - 14 GPL U/mL <9    Anticardiolipin IgM      0 - 12 MPL U/mL 12    Anticardiolipin IgA      0 - 11 APL U/mL <9    Beta-2 Glyco 1 IgG      0 - 20 GPI IgG units <9    Beta-2 Glyco 1 IgA      0 - 25 GPI IgA units <9    Beta-2 Glyco 1 IgM      0 - 32 GPI IgM units 10      Results from last 7 days   Lab Units 02/08/25  0551 02/07/25  2309 02/07/25  1620 02/06/25  0611 02/05/25  2100   INR   --   --   --   --  1.10   APTT seconds 65.7* 101.3* 93.4*   < > 30.7    < > = values in this interval not displayed.     I reviewed the patient's new clinical results.        Assessment:    1. Bilateral pulmonary emboli and significant right leg DVT.   Both acute and symptomatic.   This patient has attentive  lifestyle, and slightly overweight.  Otherwise no apparent trigger reason.    She does have nonspecified autoimmune disorder which may be related.    The patient has been on IV heparin anticoagulation which I agree with.   2/6/2025 I discussed with the patient, recommended comprehensive workup for laboratory studies for hypercoagulable status with both primary and secondary. With her history of nonspecific autoimmune disorder, I think the possibility of antiphospholipid antibody syndrome would be high. We will have comprehensive antibody studies including antiphosphatidylserine antibodies, anticardiolipin antibodies, antibeta-2 GP1 antibodies, antiphosphatidic acid antibodies and antiphosphatidylethalnolamine antibodies. Nevertheless I think we should still check primary hypercoagulable condition by checking her factor II mutation and factor V Leiden mutation, as well as protein C activity, protein S activity and protein S free antigen, and antithrombin activity.   2/6/2025 I discussed with the patient for the time of anticoagulation and this is hard to decide at this point but at least she will need 1 year treatment. Certainly she will need to be reassessed with chest angiogram study and venous Doppler study for the leg probably in 3 months if her symptoms are improving. The exact time of treatment will depend on the laboratory study as well as resolution of the thrombosis. She voiced understanding with that.   2/7/2025 patient reports tolerating heparin anticoagulation no bleeding.  Right leg edema is slightly better.  Laboratory study was negative for factor II and factor V Leyden mutation.  Normal protein C activity protein S activity and Antithrombin activity.   2/8/2025 patient reports swelling of the right leg is improving, however still having a lot of pain when she trying to walk.  Negative for anticardiolipin antibodies and antibeta-2 GP 1 antibodies.  Other antiphospholipid antibodies are still pending.   Continue IV heparin.     2. Non-specified autoimmune disorder. The patient has been taking methotrexate 8 tablets every Wednesday which equals 20 mg weekly. She missed her dose yesterday so I think she should be given the methotrexate 20 mg on 2/6/2025.  This will be repeated weekly.      3. Diabetes and hyperlipidemia management per primary team.     4. Moderate obesity. This is also a risk effect for her thrombosis.      PLAN:  Continue heparin anticoagulation for now.  Pending laboratory results for most of the antiphospholipid antibodies.  Patient is negative for primary hypercoagulable workup.  If the patient has improved symptomatology, she likely would need to be switched to Lovenox weight based subcu injection every 12 hours until we figure out her laboratory studies. According to expert opinion, patient having antiphospholipid syndrome would be better treated with Coumadin instead of NOAC.   I already sent a message to my  to set up follow-up with me as outpatient in 3 to 4 weeks to discuss results and ongoing treatment.        I discussed with the patient about laboratory results and further management plan.  Patient voiced understanding and agreeable.    Spoke with nursing staff.    Reviewed notes from cardiology and internal medicine team.      Sally Manzano MD PhD  02/08/25  12:25 EST

## 2025-02-08 NOTE — PROGRESS NOTES
LOS: 3 days   Patient Care Team:  Varghese Borden MD as PCP - General  Varghese Borden MD as PCP - Family Medicine    Chief Complaint:   Chief Complaint   Patient presents with    Leg Pain         Subjective    Today the patient is awake alert and oriented.  She is not complaining of any shortness of breath chest pain but she does have some right lower extremity pain when she gets up and walks around.  She has had some swelling in her right lower extremity but that seems to be significantly improved today.  She has no new complaints.  She would like something stronger for her leg pain which apparently bothers her some at night as well.  She has used tramadol in the past and would like to try that to see if it gives her additional relief.    Interval History:     Patient Complaints: Primarily her right lower extremity discomfort related to the clot.  Patient Denies: She denies chest pain or shortness of breath  History taken from: patient chart RN    Review of Systems:    All systems were reviewed and negative except for:  Musculoskeletal: positive for  pain in her right lower extremity in her calf area and up behind her knee and into her lower thigh    Objective      Vital Signs  Temp:  [98 °F (36.7 °C)-99.2 °F (37.3 °C)] 98.9 °F (37.2 °C)  Pulse:  [] 109  Resp:  [18] 18  BP: (111-154)/(55-97) 154/97    I/O'S  Intake & Output (last 7 days)         02/01 0701  02/02 0700 02/02 0701  02/03 0700 02/03 0701  02/04 0700 02/04 0701  02/05 0700 02/05 0701  02/06 0700 02/06 0701  02/07 0700 02/07 0701  02/08 0700 02/08 0701  02/09 0700    P.O.       220     Total Intake(mL/kg)       220 (2.4)     Net       +220                 Urine Unmeasured Occurrence      3 x 2 x             Physical Exam:   General Appearance: alert, pleasant, appears stated age, interactive, cooperative, and  resting comfortably and in no acute distress.  Lungs: clear to auscultation, respirations regular, respirations even, and  respirations unlabored  Heart: regular rhythm & normal rate, normal S1, S2, no murmur, no gallop, no rub, and no click  Abdomen: normal bowel sounds, soft non-tender, no guarding, and no rebound tenderness  Extremities: moves extremities well, no edema, and and she clearly has tenderness in her left calf and distal thigh area on palpation.  No cord is detected     Results Review:     I reviewed the patient's new clinical results.  I reviewed the patient's other test results and agree with the interpretation                     Results from last 7 days   Lab Units 02/08/25  0551 02/07/25  2309 02/07/25  0408 02/06/25  0906 02/06/25  0616 02/05/25  2100   SODIUM mmol/L 137  --  136 138 138 141   POTASSIUM mmol/L 3.7 4.0 4.0 3.9 3.9 3.9   CHLORIDE mmol/L 101  --  105 106 104 104   CO2 mmol/L 24.1  --  22.0 24.1 25.4 27.4   BUN mg/dL 10  --  9 9 10 12   CREATININE mg/dL 0.71  --  0.69 0.67 0.75 0.73   GLUCOSE mg/dL 199*  --  140* 167* 226* 156*   CALCIUM mg/dL 9.2  --  8.6 8.5* 8.7 9.0         Results from last 7 days   Lab Units 02/08/25  0551 02/07/25  0408 02/06/25  0906 02/06/25  0616 02/05/25  2100   WBC 10*3/mm3 10.93* 9.58 9.28 9.47 10.29   HEMOGLOBIN g/dL 12.8 12.1 11.7* 11.9* 13.1   HEMATOCRIT % 39.9 35.7 36.8 35.0 38.8   PLATELETS 10*3/mm3 302 247 216 208 209     Results from last 7 days   Lab Units 02/08/25  0551 02/07/25  2309 02/07/25  1620 02/07/25  0828 02/06/25  0611 02/05/25  2100   INR   --   --   --   --   --  1.10   APTT seconds 65.7* 101.3* 93.4* 110.3* 70.9* 30.7     Results from last 7 days   Lab Units 02/07/25  2309 02/06/25  0616   MAGNESIUM mg/dL 2.1 2.3     Results from last 7 days   Lab Units 02/06/25  0616   CHOLESTEROL mg/dL 177   TRIGLYCERIDES mg/dL 96   HDL CHOL mg/dL 51   LDL CHOL mg/dL 108*               Lab Results   Component Value Date    HGBA1C 8.50 (H) 02/06/2025    HGBA1C 6.59 (H) 06/08/2017    HGBA1C 6.56 (H) 02/08/2017    HGBA1C 6.71 (H) 12/09/2016    HGBA1C 6.30 (H) 11/07/2016     HGBA1C 6.5 (H) 05/09/2016     Glucose   Date/Time Value Ref Range Status   02/08/2025 1117 177 (H) 70 - 130 mg/dL Final   02/08/2025 0633 177 (H) 70 - 130 mg/dL Final   02/07/2025 2052 143 (H) 70 - 130 mg/dL Final   02/06/2025 0642 201 (H) 70 - 130 mg/dL Final       eGFR   Date Value Ref Range Status   02/08/2025 96.3 >60.0 mL/min/1.73 Final   02/07/2025 98.3 >60.0 mL/min/1.73 Final   02/06/2025 99.0 >60.0 mL/min/1.73 Final   02/06/2025 90.1 >60.0 mL/min/1.73 Final   02/05/2025 93.1 >60.0 mL/min/1.73 Final           Medication Review:   Scheduled Meds:atropine, 1 drop, Both Eyes, TID  brimonidine, 1 drop, Both Eyes, BID  calcium 500 mg vitamin D 5 mcg (200 UT), 1 tablet, Oral, Daily  cetirizine, 5 mg, Oral, BID  dapagliflozin-metformin HCl ER, 1 tablet, Oral, Daily Before Supper  dorzolamide-timolol, 1 drop, Both Eyes, BID  doxycycline, 50 mg, Oral, BID  folic acid, 400 mcg, Oral, Daily  Lifitegrast, 1 drop, Both Eyes, Q48H  methotrexate, 20 mg, Oral, Weekly  multivitamin, 1 tablet, Oral, Daily  Bromfenac Sodium, 1 drop, Both Eyes, Q AM  Netarsudil-Latanoprost, 1 drop, Both Eyes, Nightly  rosuvastatin, 10 mg, Oral, Daily  sodium chloride, 10 mL, Intravenous, Q12H      Continuous Infusions:heparin, 15.9 Units/kg/hr, Last Rate: 13.9 Units/kg/hr (02/08/25 1038)      PRN Meds:.  acetaminophen    albuterol    benzonatate    senna-docusate sodium **AND** polyethylene glycol **AND** bisacodyl **AND** bisacodyl    ondansetron ODT    [COMPLETED] Insert Peripheral IV **AND** sodium chloride    sodium chloride    sodium chloride                Bilateral pulmonary embolism    Hyperglycemia due to diabetes mellitus    Autoimmune disease    Hyperlipidemia    MARIA TERESA (generalized anxiety disorder)    Acute deep vein thrombosis (DVT) of femoral vein of right lower extremity      #1 DVT PE-she remains on heparin and hematology continues to workup for hypercoagulation.  She still has some tenderness in her right lower extremity and  hopefully the tramadol will give her some relief.  She says she has taken that in the past with no issues.  Fortunately she is not symptomatic in any other way.    2.  Nonspecific autoimmune disorder-she is on multiple medications for this and it may be a form of lupus but that is unclear at this point.  She is on her home doses of medications and seems to be stable at this point.    3.  Diabetes mellitus type 2-hemoglobin A1c is elevated and her blood sugars have been somewhat elevated here in the hospital as well.  She is on a constant carbohydrate diet and hopefully that will help.    4.  Hyperlipidemia-on medications and generally well-controlled    5.  Anxiety-primarily job-related and now she has some anxiety about this DVT as well.  Overall she seems to be relatively stable    6.  Moderate obesity-decreasing her weight, controlling her diet, and getting more exercise would help with her diabetes.        Plan for disposition:Where: home    Varghese Borden MD  02/08/25  14:08 EST          Time:

## 2025-02-09 LAB
ANION GAP SERPL CALCULATED.3IONS-SCNC: 9.8 MMOL/L (ref 5–15)
APTT PPP: 91.9 SECONDS (ref 22.7–35.4)
BASOPHILS # BLD AUTO: 0.02 10*3/MM3 (ref 0–0.2)
BASOPHILS NFR BLD AUTO: 0.2 % (ref 0–1.5)
BUN SERPL-MCNC: 11 MG/DL (ref 8–23)
BUN/CREAT SERPL: 17.2 (ref 7–25)
CALCIUM SPEC-SCNC: 9 MG/DL (ref 8.6–10.5)
CHLORIDE SERPL-SCNC: 104 MMOL/L (ref 98–107)
CO2 SERPL-SCNC: 24.2 MMOL/L (ref 22–29)
CREAT SERPL-MCNC: 0.64 MG/DL (ref 0.57–1)
DEPRECATED RDW RBC AUTO: 39.5 FL (ref 37–54)
EGFRCR SERPLBLD CKD-EPI 2021: 100.1 ML/MIN/1.73
EOSINOPHIL # BLD AUTO: 0.1 10*3/MM3 (ref 0–0.4)
EOSINOPHIL NFR BLD AUTO: 1.2 % (ref 0.3–6.2)
ERYTHROCYTE [DISTWIDTH] IN BLOOD BY AUTOMATED COUNT: 12.4 % (ref 12.3–15.4)
GLUCOSE BLDC GLUCOMTR-MCNC: 141 MG/DL (ref 70–130)
GLUCOSE BLDC GLUCOMTR-MCNC: 145 MG/DL (ref 70–130)
GLUCOSE BLDC GLUCOMTR-MCNC: 157 MG/DL (ref 70–130)
GLUCOSE BLDC GLUCOMTR-MCNC: 161 MG/DL (ref 70–130)
GLUCOSE SERPL-MCNC: 132 MG/DL (ref 65–99)
HCT VFR BLD AUTO: 35.7 % (ref 34–46.6)
HGB BLD-MCNC: 12.1 G/DL (ref 12–15.9)
IMM GRANULOCYTES # BLD AUTO: 0.02 10*3/MM3 (ref 0–0.05)
IMM GRANULOCYTES NFR BLD AUTO: 0.2 % (ref 0–0.5)
LYMPHOCYTES # BLD AUTO: 3.81 10*3/MM3 (ref 0.7–3.1)
LYMPHOCYTES NFR BLD AUTO: 45.6 % (ref 19.6–45.3)
MCH RBC QN AUTO: 29.7 PG (ref 26.6–33)
MCHC RBC AUTO-ENTMCNC: 33.9 G/DL (ref 31.5–35.7)
MCV RBC AUTO: 87.7 FL (ref 79–97)
MONOCYTES # BLD AUTO: 0.68 10*3/MM3 (ref 0.1–0.9)
MONOCYTES NFR BLD AUTO: 8.1 % (ref 5–12)
NEUTROPHILS NFR BLD AUTO: 3.73 10*3/MM3 (ref 1.7–7)
NEUTROPHILS NFR BLD AUTO: 44.7 % (ref 42.7–76)
NRBC BLD AUTO-RTO: 0 /100 WBC (ref 0–0.2)
PLATELET # BLD AUTO: 273 10*3/MM3 (ref 140–450)
PMV BLD AUTO: 9.7 FL (ref 6–12)
POTASSIUM SERPL-SCNC: 3.9 MMOL/L (ref 3.5–5.2)
PS IGA SER-ACNC: 3 APS UNITS (ref 0–19)
RBC # BLD AUTO: 4.07 10*6/MM3 (ref 3.77–5.28)
SODIUM SERPL-SCNC: 138 MMOL/L (ref 136–145)
WBC NRBC COR # BLD AUTO: 8.36 10*3/MM3 (ref 3.4–10.8)

## 2025-02-09 PROCEDURE — 80048 BASIC METABOLIC PNL TOTAL CA: CPT | Performed by: INTERNAL MEDICINE

## 2025-02-09 PROCEDURE — G0378 HOSPITAL OBSERVATION PER HR: HCPCS

## 2025-02-09 PROCEDURE — 96366 THER/PROPH/DIAG IV INF ADDON: CPT

## 2025-02-09 PROCEDURE — 99231 SBSQ HOSP IP/OBS SF/LOW 25: CPT | Performed by: INTERNAL MEDICINE

## 2025-02-09 PROCEDURE — 82948 REAGENT STRIP/BLOOD GLUCOSE: CPT

## 2025-02-09 PROCEDURE — 85025 COMPLETE CBC W/AUTO DIFF WBC: CPT | Performed by: INTERNAL MEDICINE

## 2025-02-09 PROCEDURE — 25010000002 HEPARIN (PORCINE) 25000-0.45 UT/250ML-% SOLUTION: Performed by: INTERNAL MEDICINE

## 2025-02-09 PROCEDURE — 99214 OFFICE O/P EST MOD 30 MIN: CPT | Performed by: STUDENT IN AN ORGANIZED HEALTH CARE EDUCATION/TRAINING PROGRAM

## 2025-02-09 PROCEDURE — 85730 THROMBOPLASTIN TIME PARTIAL: CPT | Performed by: INTERNAL MEDICINE

## 2025-02-09 RX ADMIN — DOXYCYCLINE 50 MG: 25 FOR SUSPENSION ORAL at 09:25

## 2025-02-09 RX ADMIN — BRIMONIDINE TARTRATE 1 DROP: 1 SOLUTION/ DROPS OPHTHALMIC at 21:05

## 2025-02-09 RX ADMIN — ATROPINE SULFATE 1 DROP: 10 SOLUTION/ DROPS OPHTHALMIC at 21:04

## 2025-02-09 RX ADMIN — ACETAMINOPHEN 325MG 650 MG: 325 TABLET ORAL at 21:09

## 2025-02-09 RX ADMIN — DORZOLAMIDE HYDROCHLORIDE AND TIMOLOL MALEATE 1 DROP: 20; 5 SOLUTION/ DROPS OPHTHALMIC at 09:24

## 2025-02-09 RX ADMIN — DAPAGLIFLOZIN AND METFORMIN HYDROCHLORIDE 1 TABLET: 10; 1000 TABLET, FILM COATED, EXTENDED RELEASE ORAL at 21:04

## 2025-02-09 RX ADMIN — DORZOLAMIDE HYDROCHLORIDE AND TIMOLOL MALEATE 1 DROP: 20; 5 SOLUTION/ DROPS OPHTHALMIC at 21:04

## 2025-02-09 RX ADMIN — BROMFENAC SODIUM 1 DROP: 0.7 SOLUTION/ DROPS OPHTHALMIC at 09:24

## 2025-02-09 RX ADMIN — DOXYCYCLINE 50 MG: 25 FOR SUSPENSION ORAL at 21:04

## 2025-02-09 RX ADMIN — ROSUVASTATIN CALCIUM 10 MG: 5 TABLET, FILM COATED ORAL at 09:25

## 2025-02-09 RX ADMIN — Medication 400 MCG: at 09:25

## 2025-02-09 RX ADMIN — Medication 10 ML: at 09:27

## 2025-02-09 RX ADMIN — ATROPINE SULFATE 1 DROP: 10 SOLUTION/ DROPS OPHTHALMIC at 17:42

## 2025-02-09 RX ADMIN — Medication 1 TABLET: at 09:26

## 2025-02-09 RX ADMIN — ATROPINE SULFATE 1 DROP: 10 SOLUTION/ DROPS OPHTHALMIC at 09:24

## 2025-02-09 RX ADMIN — HEPARIN SODIUM 13.9 UNITS/KG/HR: 10000 INJECTION, SOLUTION INTRAVENOUS at 06:23

## 2025-02-09 RX ADMIN — ACETAMINOPHEN 325MG 650 MG: 325 TABLET ORAL at 06:24

## 2025-02-09 RX ADMIN — BRIMONIDINE TARTRATE 1 DROP: 1 SOLUTION/ DROPS OPHTHALMIC at 09:24

## 2025-02-09 RX ADMIN — Medication 1 TABLET: at 09:25

## 2025-02-09 NOTE — PROGRESS NOTES
LOS: 0 days   Patient Care Team:  Martin Vilchis MD as PCP - General (Internal Medicine)    Chief Complaint: Bilateral pulmonary emboli, right leg DVT.    Interval History:  2/7/2025 patient reports right leg edema slightly better.  No chest pain dyspnea.  Denies bleeding.    2/8/2025 patient reports the right leg swelling is better however when she is trying to walk she still has significant pain in the right leg.  She denies bleeding or bruising.     2/9/2025  Patient reports she still has significant pain in her calf area when she was walking in the room to bedside, and the sink.  She denies chest pain or dyspnea.  Heparin is supratherapeutic in the morning.     A comprehensive 14 point review of systems was performed and was negative except as mentioned.    Vital Signs:  Temp:  [97.7 °F (36.5 °C)-98.2 °F (36.8 °C)] 98.2 °F (36.8 °C)  Heart Rate:  [] 74  Resp:  [18] 18  BP: ()/(59-78) 97/66    Intake/Output Summary (Last 24 hours) at 2/9/2025 0937  Last data filed at 2/9/2025 0623  Gross per 24 hour   Intake 456 ml   Output --   Net 456 ml       Physical Exam:   GENERAL:  Well-developed, well-nourished female in no acute distress.    SKIN:  Warm, dry without rashes, purpura or petechiae.  HEENT:  Normocephalic.   CHEST: Normal respiratory effort.  Lungs clear to auscultation. Good airflow.  CARDIAC:  Regular rate and rhythm. Normal S1,S2.  ABDOMEN:  Soft, no tender.  Bowel sounds normal.  EXTREMITIES: Right leg edema 1+.  Mild calf tenderness.        Results Review:   CBC:      Lab 02/09/25  0335 02/08/25  0551 02/07/25  0408 02/06/25  0906 02/06/25  0616   WBC 8.36 10.93* 9.58 9.28 9.47   HEMOGLOBIN 12.1 12.8 12.1 11.7* 11.9*   HEMATOCRIT 35.7 39.9 35.7 36.8 35.0   PLATELETS 273 302 247 216 208   NEUTROS ABS 3.73 6.67 5.25 4.90 4.84   IMMATURE GRANS (ABS) 0.02 0.04 0.03 0.01 0.02   LYMPHS ABS 3.81* 3.31* 3.12* 3.33* 3.59*   MONOS ABS 0.68 0.80 1.02* 0.93* 0.89   EOS ABS 0.10 0.09 0.13 0.09 0.11    MCV 87.7 89.5 86.7 89.3 86.6     CMP:        Lab 02/09/25  0335 02/08/25  0551 02/07/25  2309 02/07/25  0408 02/06/25  1139 02/06/25  0906 02/06/25  0616 02/05/25  2100   SODIUM 138 137  --  136  --  138 138 141   POTASSIUM 3.9 3.7 4.0 4.0  --  3.9 3.9 3.9   CHLORIDE 104 101  --  105  --  106 104 104   CO2 24.2 24.1  --  22.0  --  24.1 25.4 27.4   ANION GAP 9.8 11.9  --  9.0  --  7.9 8.6 9.6   BUN 11 10  --  9  --  9 10 12   CREATININE 0.64 0.71  --  0.69  --  0.67 0.75 0.73   EGFR 100.1 96.3  --  98.3  --  99.0 90.1 93.1   GLUCOSE 132* 199*  --  140*  --  167* 226* 156*   CALCIUM 9.0 9.2  --  8.6  --  8.5* 8.7 9.0   MAGNESIUM  --   --  2.1  --   --   --  2.3  --    TOTAL PROTEIN  --   --   --   --   --   --  7.4 8.3   ALBUMIN  --   --   --   --   --   --  3.1* 3.9   GLOBULIN  --   --   --   --   --   --  4.3 4.4   ALT (SGPT)  --   --   --   --   --   --  10 12   AST (SGOT)  --   --   --   --   --   --  12 13   BILIRUBIN  --   --   --   --   --   --  0.4 0.5   BETA 2 GLYCO 1 IGG  --   --   --   --  <9  --   --   --    BETA 2 GLYCO 1 IGA  --   --   --   --  <9  --   --   --    BETA 2 GLYCO 1 IGM  --   --   --   --  10  --   --   --    ALK PHOS  --   --   --   --   --   --  114 131*     Component      Latest Ref [facility] 2/6/2025   Factor V Leiden      Normal  Normal    Factor II, DNA Analysis      Normal  Normal    ANTITHROMBIN ACTIVITY      90 - 134 % 101    Protein C Activity      86 - 163 % 114    Protein S Functional      70 - 127 % 74    Protein S Ag, Free      49.0 - 138.0 % 85.0      Component      Latest Ref Rn 2/6/2025   Anticardiolipin IgG      0 - 14 GPL U/mL <9    Anticardiolipin IgM      0 - 12 MPL U/mL 12    Anticardiolipin IgA      0 - 11 APL U/mL <9    Beta-2 Glyco 1 IgG      0 - 20 GPI IgG units <9    Beta-2 Glyco 1 IgA      0 - 25 GPI IgA units <9    Beta-2 Glyco 1 IgM      0 - 32 GPI IgM units 10      Results from last 7 days   Lab Units 02/09/25  0832 02/08/25  2135 02/08/25  1323 02/06/25  0611  02/05/25  2100   INR   --   --   --   --  1.10   APTT seconds 91.9* 83.2* 75.7*   < > 30.7    < > = values in this interval not displayed.     I reviewed the patient's new clinical results.        Assessment:    1. Bilateral pulmonary emboli and significant right leg DVT.   Both acute and symptomatic.   This patient has attentive lifestyle, and slightly overweight.  Otherwise no apparent trigger reason.    She does have nonspecified autoimmune disorder which may be related.    The patient has been on IV heparin anticoagulation which I agree with.   2/6/2025 I discussed with the patient, recommended comprehensive workup for laboratory studies for hypercoagulable status with both primary and secondary. With her history of nonspecific autoimmune disorder, I think the possibility of antiphospholipid antibody syndrome would be high. We will have comprehensive antibody studies including antiphosphatidylserine antibodies, anticardiolipin antibodies, antibeta-2 GP1 antibodies, antiphosphatidic acid antibodies and antiphosphatidylethalnolamine antibodies. Nevertheless I think we should still check primary hypercoagulable condition by checking her factor II mutation and factor V Leiden mutation, as well as protein C activity, protein S activity and protein S free antigen, and antithrombin activity.   2/6/2025 I discussed with the patient for the time of anticoagulation and this is hard to decide at this point but at least she will need 1 year treatment. Certainly she will need to be reassessed with chest angiogram study and venous Doppler study for the leg probably in 3 months if her symptoms are improving. The exact time of treatment will depend on the laboratory study as well as resolution of the thrombosis. She voiced understanding with that.   2/7/2025 patient reports tolerating heparin anticoagulation no bleeding.  Right leg edema is slightly better.  Laboratory study was negative for factor II and factor V Leyden  mutation.  Normal protein C activity protein S activity and Antithrombin activity.   2/8/2025 patient reports swelling of the right leg is improving, however still having a lot of pain when she trying to walk.  Negative for anticardiolipin antibodies and antibeta-2 GP 1 antibodies.  Other antiphospholipid antibodies are still pending.  Continue IV heparin.  2/9/2025 patient reports she continues to have a pain in the right leg in the calf area when she was walking the room to the bedside commode and the sink.  So I recommended to continue IV heparin anticoagulation for now.     2. Non-specified autoimmune disorder. The patient has been taking methotrexate 8 tablets every Wednesday which equals 20 mg weekly. She missed her dose yesterday so I think she should be given the methotrexate 20 mg on 2/6/2025.  This will be repeated weekly.      3. Diabetes and hyperlipidemia management per primary team.     4. Moderate obesity. This is also a risk effect for her thrombosis.      PLAN:  Continue heparin anticoagulation for now.  Pending laboratory results for most of the antiphospholipid antibodies.  Patient is negative for primary hypercoagulable workup.  If the patient has improved symptomatology, she likely would need to be switched to Lovenox weight based subcu injection every 12 hours until we figure out her laboratory studies. According to expert opinion, patient having antiphospholipid syndrome would be better treated with Coumadin instead of NOAC.   I already sent a message to my  to set up follow-up with me as outpatient in 3 to 4 weeks to discuss results and ongoing treatment.        I discussed with the patient about laboratory results and further management plan.  Patient voiced understanding and agreeable.    Spoke with nursing staff.    Reviewed notes from cardiology and internal medicine team.      Sally Manzano MD PhD  02/09/25  09:37 EST

## 2025-02-09 NOTE — PROGRESS NOTES
LOS: 3 days   Patient Care Team:  Varghese Borden MD as PCP - General  Varghese Borden MD as PCP - Family Medicine    Chief Complaint:   Chief Complaint   Patient presents with    Leg Pain         Subjective    Today the patient is awake, alert, and oriented.  She still has pain in her leg from time to time but mostly with activity.  She is very comfortable at rest.  She had an episode of SVT that was asymptomatic last night.  She was placed on low-dose beta-blocker but her blood pressure is a little low today and the nurse held the medication.  She has had a workup for this in the past that showed no evidence for underlying cardiac disease.  As noted she was asymptomatic.  She seems very stable today.    Interval History:     Patient Complaints: The pain in her right lower extremity is primarily her only complaint.  Otherwise she seems to be very comfortable and doing well  Patient Denies: She denies shortness of breath or chest pain, no abdominal pain or nausea  History taken from: patient chart RN    Review of Systems:    All systems were reviewed and negative except for:  Musculoskeletal: positive for  she has pain in her right calf and right lower thigh on palpation.  No cord is noted    Objective      Vital Signs  Temp:  [98 °F (36.7 °C)-99.2 °F (37.3 °C)] 98.9 °F (37.2 °C)  Pulse:  [] 109  Resp:  [18] 18  BP: (111-154)/(55-97) 154/97    I/O'S  Intake & Output (last 7 days)         02/02 0701  02/03 0700 02/03 0701  02/04 0700 02/04 0701  02/05 0700 02/05 0701  02/06 0700 02/06 0701  02/07 0700 02/07 0701  02/08 0700 02/08 0701  02/09 0700 02/09 0701  02/10 0700    P.O.      220 456     Total Intake(mL/kg)      220 (2.4) 456 (5)     Net      +220 +456                 Urine Unmeasured Occurrence     3 x 2 x 1 x 1 x    Stool Unmeasured Occurrence        1 x            Physical Exam:   General Appearance: alert, pleasant, appears stated age, interactive, fatigued, cooperative, and she looks  comfortable and in no distress.  Lungs: clear to auscultation, respirations regular, respirations even, and respirations unlabored  Heart: regular rhythm & normal rate and no ectopy is noted on exam  Abdomen: normal bowel sounds, soft non-tender, no guarding, and no rebound tenderness  Extremities: no edema, no cyanosis, no redness, and she does have tenderness in her right calf and right thigh as noted on exam and when she is up and moving but at rest she is very comfortable.     Results Review:     I reviewed the patient's new clinical results.  I reviewed the patient's other test results and agree with the interpretation                     Results from last 7 days   Lab Units 02/09/25  0335 02/08/25  0551 02/07/25  2309 02/07/25  0408 02/06/25  0906 02/06/25  0616 02/05/25  2100   SODIUM mmol/L 138 137  --  136 138 138 141   POTASSIUM mmol/L 3.9 3.7 4.0 4.0 3.9 3.9 3.9   CHLORIDE mmol/L 104 101  --  105 106 104 104   CO2 mmol/L 24.2 24.1  --  22.0 24.1 25.4 27.4   BUN mg/dL 11 10  --  9 9 10 12   CREATININE mg/dL 0.64 0.71  --  0.69 0.67 0.75 0.73   GLUCOSE mg/dL 132* 199*  --  140* 167* 226* 156*   CALCIUM mg/dL 9.0 9.2  --  8.6 8.5* 8.7 9.0         Results from last 7 days   Lab Units 02/09/25  0335 02/08/25  0551 02/07/25  0408 02/06/25  0906 02/06/25  0616 02/05/25  2100   WBC 10*3/mm3 8.36 10.93* 9.58 9.28 9.47 10.29   HEMOGLOBIN g/dL 12.1 12.8 12.1 11.7* 11.9* 13.1   HEMATOCRIT % 35.7 39.9 35.7 36.8 35.0 38.8   PLATELETS 10*3/mm3 273 302 247 216 208 209     Results from last 7 days   Lab Units 02/09/25  0832 02/08/25  2135 02/08/25  1323 02/08/25  0551 02/07/25  2309 02/07/25  1620 02/07/25  0828 02/06/25  0611 02/05/25  2100   INR   --   --   --   --   --   --   --   --  1.10   APTT seconds 91.9* 83.2* 75.7* 65.7* 101.3* 93.4* 110.3*   < > 30.7    < > = values in this interval not displayed.     Results from last 7 days   Lab Units 02/07/25 2309 02/06/25  0616   MAGNESIUM mg/dL 2.1 2.3     Results from  last 7 days   Lab Units 02/06/25  0616   CHOLESTEROL mg/dL 177   TRIGLYCERIDES mg/dL 96   HDL CHOL mg/dL 51   LDL CHOL mg/dL 108*               Lab Results   Component Value Date    HGBA1C 8.50 (H) 02/06/2025    HGBA1C 6.59 (H) 06/08/2017    HGBA1C 6.56 (H) 02/08/2017    HGBA1C 6.71 (H) 12/09/2016    HGBA1C 6.30 (H) 11/07/2016    HGBA1C 6.5 (H) 05/09/2016     Glucose   Date/Time Value Ref Range Status   02/09/2025 1053 157 (H) 70 - 130 mg/dL Final   02/09/2025 0548 141 (H) 70 - 130 mg/dL Final   02/08/2025 2006 152 (H) 70 - 130 mg/dL Final   02/08/2025 1757 192 (H) 70 - 130 mg/dL Final   02/08/2025 1609 129 70 - 130 mg/dL Final   02/08/2025 1117 177 (H) 70 - 130 mg/dL Final   02/08/2025 0633 177 (H) 70 - 130 mg/dL Final   02/07/2025 2052 143 (H) 70 - 130 mg/dL Final       eGFR   Date Value Ref Range Status   02/09/2025 100.1 >60.0 mL/min/1.73 Final   02/08/2025 96.3 >60.0 mL/min/1.73 Final   02/07/2025 98.3 >60.0 mL/min/1.73 Final   02/06/2025 99.0 >60.0 mL/min/1.73 Final   02/06/2025 90.1 >60.0 mL/min/1.73 Final   02/05/2025 93.1 >60.0 mL/min/1.73 Final           Medication Review:   Scheduled Meds:atropine, 1 drop, Both Eyes, TID  brimonidine, 1 drop, Both Eyes, BID  calcium 500 mg vitamin D 5 mcg (200 UT), 1 tablet, Oral, Daily  cetirizine, 5 mg, Oral, BID  dapagliflozin-metformin HCl ER, 1 tablet, Oral, Daily Before Supper  dorzolamide-timolol, 1 drop, Both Eyes, BID  doxycycline, 50 mg, Oral, BID  folic acid, 400 mcg, Oral, Daily  Lifitegrast, 1 drop, Both Eyes, Q48H  methotrexate, 20 mg, Oral, Weekly  metoprolol succinate XL, 12.5 mg, Oral, Q24H  multivitamin, 1 tablet, Oral, Daily  Bromfenac Sodium, 1 drop, Both Eyes, Q AM  Netarsudil-Latanoprost, 1 drop, Both Eyes, Nightly  rosuvastatin, 10 mg, Oral, Daily  sodium chloride, 10 mL, Intravenous, Q12H      Continuous Infusions:heparin, 15.9 Units/kg/hr, Last Rate: 13.9 Units/kg/hr (02/09/25 0623)      PRN Meds:.  acetaminophen    albuterol    benzonatate     senna-docusate sodium **AND** polyethylene glycol **AND** bisacodyl **AND** bisacodyl    ondansetron ODT    [COMPLETED] Insert Peripheral IV **AND** sodium chloride    sodium chloride    sodium chloride    traMADol                Bilateral pulmonary embolism    Hyperglycemia due to diabetes mellitus    Autoimmune disease    Hyperlipidemia    MARIA TERESA (generalized anxiety disorder)    Acute deep vein thrombosis (DVT) of femoral vein of right lower extremity      #1 DVT/PE-she remains on heparin and her workup by hematology continues.  The primary workup has come back negative so far but there are other tests pending.  She continues to be followed closely by hematology.  She understands that she will be on long-term anticoagulation going forward.    2.  Nonspecific autoimmune disorder-she is on multiple medications and for the most part she seems relatively stable.  Is possible the DVT/PE is related to this issue versus her sedentary lifestyle and work environment.    3.  Diabetes mellitus type 2-her A1c was high but her blood sugars have come under better control since admission.  She is on a controlled diabetic diet now and may not have been following her diet closely at home.  She certainly was not getting any exercise.    5.  Anxiety-primarily related to her work which causes her a great deal of stress.    6.  Moderate obesity-increasing her exercise and watching her diet more closely will certainly enhance her long-term health and wellbeing.    7.  SVT-she had a run of SVT last evening that was asymptomatic.  She has had a workup for this in the past with essentially negative outcome and apparently she was not put on any medications.  We placed her on low-dose beta-blocker last night but she was somewhat hypotensive this morning although she was not symptomatic with that either.  For now she seems stable.        Plan for disposition:Where: home    Varghese Borden MD  02/09/25  13:20 EST          Time:      Detail Level: Detailed

## 2025-02-09 NOTE — PROGRESS NOTES
Received a call from RN regarding patient who is admitted for RLE DVT and bilateral PE. Patient has been having runs of SVT for the past 24 hours. Overall asymptomatic and vital signs stable. Afebrile and electrolytes are WNL. Will add 12.5 mg Toprol and can consider increasing in AM if BP tolerates.

## 2025-02-10 ENCOUNTER — APPOINTMENT (OUTPATIENT)
Dept: CARDIOLOGY | Facility: HOSPITAL | Age: 65
End: 2025-02-10
Payer: COMMERCIAL

## 2025-02-10 DIAGNOSIS — I26.99 BILATERAL PULMONARY EMBOLISM: Primary | ICD-10-CM

## 2025-02-10 LAB
ANION GAP SERPL CALCULATED.3IONS-SCNC: 8.3 MMOL/L (ref 5–15)
AORTIC ARCH: 3 CM
AORTIC DIMENSIONLESS INDEX: 0.74 (DI)
APTT HEX PL PPP: 4 SEC (ref 0–11)
APTT PPP: 83.7 SECONDS (ref 22.7–35.4)
APTT SCREEN TO CONFIRM RATIO: 1.01 RATIO (ref 0–1.34)
ASCENDING AORTA: 2.8 CM
AV MEAN PRESS GRAD SYS DOP V1V2: 6.5 MMHG
AV VMAX SYS DOP: 166.3 CM/SEC
BASOPHILS # BLD AUTO: 0.02 10*3/MM3 (ref 0–0.2)
BASOPHILS NFR BLD AUTO: 0.2 % (ref 0–1.5)
BH CV ECHO MEAS - ACS: 1.87 CM
BH CV ECHO MEAS - AO MAX PG: 11.1 MMHG
BH CV ECHO MEAS - AO ROOT DIAM: 3.1 CM
BH CV ECHO MEAS - AO V2 VTI: 31.9 CM
BH CV ECHO MEAS - AVA(I,D): 1.86 CM2
BH CV ECHO MEAS - EDV(CUBED): 105 ML
BH CV ECHO MEAS - EDV(MOD-SP2): 182 ML
BH CV ECHO MEAS - EDV(MOD-SP4): 105 ML
BH CV ECHO MEAS - EF(MOD-SP2): 63.2 %
BH CV ECHO MEAS - EF(MOD-SP4): 54.3 %
BH CV ECHO MEAS - ESV(CUBED): 48.2 ML
BH CV ECHO MEAS - ESV(MOD-SP2): 67 ML
BH CV ECHO MEAS - ESV(MOD-SP4): 48 ML
BH CV ECHO MEAS - FS: 22.9 %
BH CV ECHO MEAS - IVS/LVPW: 0.92 CM
BH CV ECHO MEAS - IVSD: 1.03 CM
BH CV ECHO MEAS - LAT PEAK E' VEL: 11 CM/SEC
BH CV ECHO MEAS - LV DIASTOLIC VOL/BSA (35-75): 53.5 CM2
BH CV ECHO MEAS - LV MASS(C)D: 183.3 GRAMS
BH CV ECHO MEAS - LV MAX PG: 5.9 MMHG
BH CV ECHO MEAS - LV MEAN PG: 3.4 MMHG
BH CV ECHO MEAS - LV SYSTOLIC VOL/BSA (12-30): 24.4 CM2
BH CV ECHO MEAS - LV V1 MAX: 121.1 CM/SEC
BH CV ECHO MEAS - LV V1 VTI: 23.8 CM
BH CV ECHO MEAS - LVIDD: 4.7 CM
BH CV ECHO MEAS - LVIDS: 3.6 CM
BH CV ECHO MEAS - LVOT AREA: 2.5 CM2
BH CV ECHO MEAS - LVOT DIAM: 1.78 CM
BH CV ECHO MEAS - LVPWD: 1.12 CM
BH CV ECHO MEAS - MED PEAK E' VEL: 13.5 CM/SEC
BH CV ECHO MEAS - MV A DUR: 0.12 SEC
BH CV ECHO MEAS - MV A MAX VEL: 104.8 CM/SEC
BH CV ECHO MEAS - MV DEC SLOPE: 590.4 CM/SEC2
BH CV ECHO MEAS - MV DEC TIME: 0.15 SEC
BH CV ECHO MEAS - MV E MAX VEL: 104 CM/SEC
BH CV ECHO MEAS - MV E/A: 0.99
BH CV ECHO MEAS - MV MAX PG: 6.4 MMHG
BH CV ECHO MEAS - MV MEAN PG: 3.7 MMHG
BH CV ECHO MEAS - MV P1/2T: 65.7 MSEC
BH CV ECHO MEAS - MV V2 VTI: 31.7 CM
BH CV ECHO MEAS - MVA(P1/2T): 3.3 CM2
BH CV ECHO MEAS - MVA(VTI): 1.87 CM2
BH CV ECHO MEAS - PA V2 MAX: 132.6 CM/SEC
BH CV ECHO MEAS - PULM A REVS DUR: 0.11 SEC
BH CV ECHO MEAS - PULM A REVS VEL: 32.5 CM/SEC
BH CV ECHO MEAS - PULM DIAS VEL: 43.3 CM/SEC
BH CV ECHO MEAS - PULM S/D: 1.59
BH CV ECHO MEAS - PULM SYS VEL: 69 CM/SEC
BH CV ECHO MEAS - RV MAX PG: 3.8 MMHG
BH CV ECHO MEAS - RV V1 MAX: 98 CM/SEC
BH CV ECHO MEAS - RV V1 VTI: 18.4 CM
BH CV ECHO MEAS - SV(LVOT): 59.4 ML
BH CV ECHO MEAS - SV(MOD-SP2): 115 ML
BH CV ECHO MEAS - SV(MOD-SP4): 57 ML
BH CV ECHO MEAS - SVI(LVOT): 30.2 ML/M2
BH CV ECHO MEAS - SVI(MOD-SP2): 58.5 ML/M2
BH CV ECHO MEAS - SVI(MOD-SP4): 29 ML/M2
BH CV ECHO MEAS - TAPSE (>1.6): 1.9 CM
BH CV ECHO MEASUREMENTS AVERAGE E/E' RATIO: 8.49
BH CV XLRA - RV BASE: 2.8 CM
BH CV XLRA - RV LENGTH: 6.3 CM
BH CV XLRA - RV MID: 2.8 CM
BH CV XLRA - TDI S': 7.7 CM/SEC
BUN SERPL-MCNC: 11 MG/DL (ref 8–23)
BUN/CREAT SERPL: 15.3 (ref 7–25)
CALCIUM SPEC-SCNC: 8.9 MG/DL (ref 8.6–10.5)
CHLORIDE SERPL-SCNC: 105 MMOL/L (ref 98–107)
CO2 SERPL-SCNC: 23.7 MMOL/L (ref 22–29)
CONFIRM APTT/NORMAL: 38.4 SEC (ref 0–47.6)
CREAT SERPL-MCNC: 0.72 MG/DL (ref 0.57–1)
DEPRECATED RDW RBC AUTO: 41.4 FL (ref 37–54)
EGFRCR SERPLBLD CKD-EPI 2021: 94.7 ML/MIN/1.73
EOSINOPHIL # BLD AUTO: 0.1 10*3/MM3 (ref 0–0.4)
EOSINOPHIL NFR BLD AUTO: 1 % (ref 0.3–6.2)
ERYTHROCYTE [DISTWIDTH] IN BLOOD BY AUTOMATED COUNT: 12.5 % (ref 12.3–15.4)
GLUCOSE BLDC GLUCOMTR-MCNC: 112 MG/DL (ref 70–130)
GLUCOSE BLDC GLUCOMTR-MCNC: 140 MG/DL (ref 70–130)
GLUCOSE BLDC GLUCOMTR-MCNC: 186 MG/DL (ref 70–130)
GLUCOSE SERPL-MCNC: 115 MG/DL (ref 65–99)
HCT VFR BLD AUTO: 36.3 % (ref 34–46.6)
HGB BLD-MCNC: 11.6 G/DL (ref 12–15.9)
IMM GRANULOCYTES # BLD AUTO: 0.02 10*3/MM3 (ref 0–0.05)
IMM GRANULOCYTES NFR BLD AUTO: 0.2 % (ref 0–0.5)
LA 2 SCREEN W REFLEX-IMP: ABNORMAL
LEFT ATRIUM VOLUME INDEX: 20.7 ML/M2
LV EF BIPLANE MOD: 61.8 %
LYMPHOCYTES # BLD AUTO: 4.49 10*3/MM3 (ref 0.7–3.1)
LYMPHOCYTES NFR BLD AUTO: 46 % (ref 19.6–45.3)
MAGNESIUM SERPL-MCNC: 2.3 MG/DL (ref 1.6–2.4)
MCH RBC QN AUTO: 28.8 PG (ref 26.6–33)
MCHC RBC AUTO-ENTMCNC: 32 G/DL (ref 31.5–35.7)
MCV RBC AUTO: 90.1 FL (ref 79–97)
MIXING APTT: 41.2 SEC (ref 0–40.5)
MONOCYTES # BLD AUTO: 0.81 10*3/MM3 (ref 0.1–0.9)
MONOCYTES NFR BLD AUTO: 8.3 % (ref 5–12)
NEUTROPHILS NFR BLD AUTO: 4.33 10*3/MM3 (ref 1.7–7)
NEUTROPHILS NFR BLD AUTO: 44.3 % (ref 42.7–76)
NRBC BLD AUTO-RTO: 0 /100 WBC (ref 0–0.2)
PLATELET # BLD AUTO: 317 10*3/MM3 (ref 140–450)
PMV BLD AUTO: 9.7 FL (ref 6–12)
POTASSIUM SERPL-SCNC: 3.8 MMOL/L (ref 3.5–5.2)
POTASSIUM SERPL-SCNC: 4.2 MMOL/L (ref 3.5–5.2)
RBC # BLD AUTO: 4.03 10*6/MM3 (ref 3.77–5.28)
SCREEN APTT: 45.7 SEC (ref 0–43.5)
SCREEN DRVVT: 42.5 SEC (ref 0–47)
SINUS: 2.7 CM
SODIUM SERPL-SCNC: 137 MMOL/L (ref 136–145)
THROMBIN TIME: >150 SEC (ref 0–23)
TT IMM NP PPP: 109.2 SEC (ref 0–23)
TT P HPASE PPP: 19.7 SEC (ref 0–23)
WBC NRBC COR # BLD AUTO: 9.77 10*3/MM3 (ref 3.4–10.8)

## 2025-02-10 PROCEDURE — 93306 TTE W/DOPPLER COMPLETE: CPT | Performed by: INTERNAL MEDICINE

## 2025-02-10 PROCEDURE — 80048 BASIC METABOLIC PNL TOTAL CA: CPT | Performed by: INTERNAL MEDICINE

## 2025-02-10 PROCEDURE — 99214 OFFICE O/P EST MOD 30 MIN: CPT

## 2025-02-10 PROCEDURE — 99232 SBSQ HOSP IP/OBS MODERATE 35: CPT | Performed by: INTERNAL MEDICINE

## 2025-02-10 PROCEDURE — 25010000002 ENOXAPARIN PER 10 MG: Performed by: INTERNAL MEDICINE

## 2025-02-10 PROCEDURE — 85025 COMPLETE CBC W/AUTO DIFF WBC: CPT | Performed by: INTERNAL MEDICINE

## 2025-02-10 PROCEDURE — 25010000002 HEPARIN (PORCINE) 25000-0.45 UT/250ML-% SOLUTION: Performed by: INTERNAL MEDICINE

## 2025-02-10 PROCEDURE — G0378 HOSPITAL OBSERVATION PER HR: HCPCS

## 2025-02-10 PROCEDURE — 96366 THER/PROPH/DIAG IV INF ADDON: CPT

## 2025-02-10 PROCEDURE — 85730 THROMBOPLASTIN TIME PARTIAL: CPT | Performed by: INTERNAL MEDICINE

## 2025-02-10 PROCEDURE — 82948 REAGENT STRIP/BLOOD GLUCOSE: CPT

## 2025-02-10 PROCEDURE — 96372 THER/PROPH/DIAG INJ SC/IM: CPT

## 2025-02-10 PROCEDURE — 83735 ASSAY OF MAGNESIUM: CPT | Performed by: NURSE PRACTITIONER

## 2025-02-10 PROCEDURE — 25510000001 PERFLUTREN 6.52 MG/ML SUSPENSION 2 ML VIAL: Performed by: STUDENT IN AN ORGANIZED HEALTH CARE EDUCATION/TRAINING PROGRAM

## 2025-02-10 PROCEDURE — 93306 TTE W/DOPPLER COMPLETE: CPT

## 2025-02-10 PROCEDURE — 84132 ASSAY OF SERUM POTASSIUM: CPT | Performed by: NURSE PRACTITIONER

## 2025-02-10 RX ORDER — METOPROLOL SUCCINATE 25 MG/1
25 TABLET, EXTENDED RELEASE ORAL NIGHTLY
Status: DISCONTINUED | OUTPATIENT
Start: 2025-02-11 | End: 2025-02-10

## 2025-02-10 RX ORDER — METOPROLOL SUCCINATE 25 MG/1
25 TABLET, EXTENDED RELEASE ORAL NIGHTLY
Status: DISCONTINUED | OUTPATIENT
Start: 2025-02-10 | End: 2025-02-11

## 2025-02-10 RX ORDER — ENOXAPARIN SODIUM 100 MG/ML
1 INJECTION SUBCUTANEOUS EVERY 12 HOURS
Status: DISCONTINUED | OUTPATIENT
Start: 2025-02-10 | End: 2025-02-12 | Stop reason: HOSPADM

## 2025-02-10 RX ADMIN — Medication 10 ML: at 20:11

## 2025-02-10 RX ADMIN — BRIMONIDINE TARTRATE 1 DROP: 1 SOLUTION/ DROPS OPHTHALMIC at 20:16

## 2025-02-10 RX ADMIN — ENOXAPARIN SODIUM 90 MG: 100 INJECTION SUBCUTANEOUS at 18:04

## 2025-02-10 RX ADMIN — Medication 1 TABLET: at 09:04

## 2025-02-10 RX ADMIN — METOPROLOL SUCCINATE 25 MG: 25 TABLET, EXTENDED RELEASE ORAL at 20:54

## 2025-02-10 RX ADMIN — DORZOLAMIDE HYDROCHLORIDE AND TIMOLOL MALEATE 1 DROP: 20; 5 SOLUTION/ DROPS OPHTHALMIC at 09:09

## 2025-02-10 RX ADMIN — DOXYCYCLINE 50 MG: 25 FOR SUSPENSION ORAL at 09:06

## 2025-02-10 RX ADMIN — ATROPINE SULFATE 1 DROP: 10 SOLUTION/ DROPS OPHTHALMIC at 09:08

## 2025-02-10 RX ADMIN — PERFLUTREN 2 ML: 6.52 INJECTION, SUSPENSION INTRAVENOUS at 11:38

## 2025-02-10 RX ADMIN — DAPAGLIFLOZIN AND METFORMIN HYDROCHLORIDE 1 TABLET: 10; 1000 TABLET, FILM COATED, EXTENDED RELEASE ORAL at 20:09

## 2025-02-10 RX ADMIN — Medication 400 MCG: at 09:04

## 2025-02-10 RX ADMIN — Medication 1 TABLET: at 09:05

## 2025-02-10 RX ADMIN — ACETAMINOPHEN 325MG 650 MG: 325 TABLET ORAL at 19:28

## 2025-02-10 RX ADMIN — TRAMADOL HYDROCHLORIDE 50 MG: 50 TABLET ORAL at 22:35

## 2025-02-10 RX ADMIN — TRAMADOL HYDROCHLORIDE 50 MG: 50 TABLET ORAL at 01:24

## 2025-02-10 RX ADMIN — BROMFENAC SODIUM 1 DROP: 0.7 SOLUTION/ DROPS OPHTHALMIC at 09:09

## 2025-02-10 RX ADMIN — ATROPINE SULFATE 1 DROP: 10 SOLUTION/ DROPS OPHTHALMIC at 20:15

## 2025-02-10 RX ADMIN — DOXYCYCLINE 50 MG: 25 FOR SUSPENSION ORAL at 20:09

## 2025-02-10 RX ADMIN — Medication 10 ML: at 09:10

## 2025-02-10 RX ADMIN — DORZOLAMIDE HYDROCHLORIDE AND TIMOLOL MALEATE 1 DROP: 20; 5 SOLUTION/ DROPS OPHTHALMIC at 20:15

## 2025-02-10 RX ADMIN — ATROPINE SULFATE 1 DROP: 10 SOLUTION/ DROPS OPHTHALMIC at 18:07

## 2025-02-10 RX ADMIN — HEPARIN SODIUM 13.9 UNITS/KG/HR: 10000 INJECTION, SOLUTION INTRAVENOUS at 01:22

## 2025-02-10 RX ADMIN — ROSUVASTATIN CALCIUM 10 MG: 5 TABLET, FILM COATED ORAL at 09:04

## 2025-02-10 RX ADMIN — BRIMONIDINE TARTRATE 1 DROP: 1 SOLUTION/ DROPS OPHTHALMIC at 09:09
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
## 2025-02-10 NOTE — CASE MANAGEMENT/SOCIAL WORK
Continued Stay Note  [facility]     Patient Name: Lala Dejesus  MRN: [id number]  Today's Date: 2/10/2025    Admit Date: 2/5/2025    Plan: Home, family to transport.   Discharge Plan       Row Name 02/10/25 0950       Plan    Plan Home, family to transport.    Patient/Family in Agreement with Plan yes    Plan Comments CCP met with pt at the bedside to confirm DC. Pt confirmed DC plan is to return home, family to transport. Sommer RN/CCP                   Discharge Codes    No documentation.                 Expected Discharge Date and Time       Expected Discharge Date Expected Discharge Time    Feb 9, 2025               Gaby Woods RN

## 2025-02-10 NOTE — PROGRESS NOTES
"[facility]: Enoxaparin Consult    Lala Dejesus has a pharmacy consult to dose full-dose enoxaparin per Martin Vilchis MD's request and hematology Randell Cotto Jr., MD's recommendation.    Indication: DVT/PE (active thrombosis)  Home Anticoagulation: none     Relevant clinical data and objective history reviewed:  64 y.o. female 162.6 cm (64\") 91.6 kg (202 lb)   Body mass index is 34.67 kg/m².   Results from last 7 days   Lab Units 02/10/25  0232   PLATELETS 10*3/mm3 317     Estimated Creatinine Clearance: 88.9 mL/min (by C-G formula based on SCr of 0.72 mg/dL).    Assessment/Plan    Will start patient on  90 mg (1mg/kg) subcutaneous every 12 hours, adjusted for renal function. Consult order will be discontinued but pharmacy will continue to follow. Heparin will be discontinued upon ordering lovenox per pharmacy consult request.    Marlee Singh, PharmD  Clinical Pharmacist   " 1 pair

## 2025-02-10 NOTE — CONSULTS
"Diabetes Education  Assessment/Teaching    Patient Name:  Lala Dejesus  YOB: 1960  MRN: [id number]  Admit Date:  2/5/2025      Assessment Date:  2/10/2025  Flowsheet Row Most Recent Value   General Information     Referral From: A1c, Database  [A1c 8.5%]   Height 162.6 cm (64\")   Weight 91.6 kg (202 lb)   Weight Method Standing scale   How would you rate your current health? good   Patient expressed need diet educuation   Pregnancy Assessment    Diabetes History    What type of diabetes do you have? Type 2   Length of Diabetes Diagnosis 10 + years   Current DM knowledge fair   Do you test your blood sugar at home? no   What makes it difficult for you to take care of your diabetes or yourself? just learinign how to eat better--i eat a tvery odd times   Education Preferences    What areas of diabetes would you like to learn about? avoiding high blood sugar, medications for diabetes, testing my blood sugar at home, avoiding low blood sugar, diet information   Nutrition Information    Assessment Topics    Healthy Eating - Assessment Needs education   Being Active - Assessment Needs education   Taking Medication - Assessment Needs education   Problem Solving - Assessment Needs education   Monitoring - Assessment Needs education   DM Goals    Healthy Eating - Goal 0-7 days from discharge   Being Active - Goal 0-7 days from discharge   Taking Medication - Goal 0-7 days from discharge   Monitoring - Goal 0-7 days from discharge   Contact Plan Outpatient DM education referral, 30-90 days from discharge            Flowsheet Row Most Recent Value   DM Education Needs    Meter Meter provided   Frequency of Testing 2 times a day   Medication Oral  [Xigduo]   Physical Activity Frequency Discussed exercise importance   Healthy Coping Appropriate   Discharge Plan Home, Follow-up with MD  [Dr Vilchis]   Motivation Moderate   Teaching Method Explanation, Discussion   Patient Response Needs reinforcement, Verbalized " understanding              Other Comments:  discussed with pt her diabetes management.She states that she sees Dr Vilchis. They recently did an eval with a Dexcom for 2 weeks. Encouraged pt to look into using a dexcom in future,there is a lot to learn from the device.    Pt discussed diet and she states she eats at very odd times a day. Once again stressed to her that could would learn a lot with using a CGM.    She is getting ready to see an Endo in March. Written material given on all that was discussed. Encouraged pt to see us for an OP education with dietician and RN.            Electronically signed by:  Neelam Craft RN  02/10/25 13:26 EST

## 2025-02-10 NOTE — PLAN OF CARE
Goal Outcome Evaluation:      63 yo female admitted 2/5 with mohini pulmonary embolism. Heparin drip dc'd, lovenox initiated today. Echo done. VS stable, room air, A&Ox4.

## 2025-02-10 NOTE — CONSULTS
[facility]  [phone]  Dr. Kash Ambrocio      Subjective   LOS: 0 days     Patient originally presenting on 2/6/2025 after having roughly a week and a half of leg pain primarily on the right side which she initially attributed to her chronic arthralgias of the knee.  Contacted her primary care doctor who recommended she go to the ER for evaluation.  In the ER she was found to have right lower extremity DVT and subsequent CTA showed bilateral pulmonary emboli.  She was not having significant shortness of breath or chest pains prior to the admission which surprised her.  She does have a fair amount of anxiety which leads to occasional episodes of dyspnea and chest discomfort.  Regarding her pulmonary history, she does carry a diagnosis of asthma/reactive airways disease that was diagnosed many years ago in childhood.  Now she really only gets flares or breathing issues seasonally.  During these times she will use her nebulizer machine which improves this almost immediately.  She does not use any inhalers on a regular basis and she does have an albuterol HFA which she uses as needed.  She is a never smoker and denies any other pulmonary history.  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    Lala Dejesus  reports current alcohol use.,  reports that she has never smoked. She has never been exposed to tobacco smoke. She has never used smokeless tobacco.     Past Hx:  has a past medical history of Abdominal pain, Abnormal Pap smear of cervix, Allergic reaction, Allergic rhinitis, Amenorrhea, Ankle sprain (20+ years), Breast lump, DM (diabetes mellitus), FH: early death, Gastritis, Gastroparesis, GERD (gastroesophageal reflux disease), Glaucoma, H/O mammogram, Hair loss, Hiatal hernia, History of bone density study (10/01/2012), History of  colonoscopy (03/2015), History of EKG (12/06/2012), Hyperlipidemia, Insomnia, Insulin resistance, Knee sprain (50 yrs ago), Low back pain, Low back strain (20+ years ago), Metabolic syndrome, Papanicolaou smear (01/2016), Retinal break, Right knee DJD, Stricture of esophagus, Trauma, and Weight loss.  Surg Hx:  has a past surgical history that includes LASIK (Right, 09/01/2012); Eye surgery (Bilateral); LEEP (N/A, 1994); Nose surgery; and Colonoscopy (04/25/2014).  FH: family history includes Heart attack in her father and paternal grandfather; Heart disease in her father and another family member; Hypertension in her mother and sister; Obesity in her sister; Stroke in her father, paternal grandfather, and sister; Sudden death in an other family member.  SH:  reports that she has never smoked. She has never been exposed to tobacco smoke. She has never used smokeless tobacco. She reports current alcohol use. She reports that she does not use drugs.    Medications Prior to Admission   Medication Sig Dispense Refill Last Dose/Taking    albuterol sulfate  (90 Base) MCG/ACT inhaler INL 2 PFS PO Q 4 H PRN   Taking    Alphagan P 0.1 % solution ophthalmic solution Administer 1 drop to both eyes 2 (Two) Times a Day.   Taking    atropine 1 % ophthalmic solution Administer 1 drop to both eyes Every Morning.   Taking    Biotin 10 MG tablet Take  by mouth Daily.   Taking    Calcium Carb-Cholecalciferol 600-200 MG-UNIT tablet Take  by mouth.   Taking    dorzolamide-timolol (COSOPT) 2-0.5 % ophthalmic solution Administer 1 drop to both eyes 2 (Two) Times a Day.   Taking    doxycycline (ADOXA) 50 MG tablet Take 1 tablet by mouth 2 (Two) Times a Day.   Taking    folic acid (FOLVITE) 1 MG tablet Take 1 tablet by mouth 2 (Two) Times a Day.   Taking    inFLIXimab (REMICADE IV) Infuse  into a venous catheter.   Taking    Lifitegrast (XIIDRA) 5 % ophthalmic solution Administer 1 drop to both eyes Every Other Day.   Taking     loratadine-pseudoephedrine (CLARITIN-D 12-hour) 5-120 MG per 12 hr tablet Take  by mouth.   Taking    methotrexate 2.5 MG tablet Take 8 tablets by mouth 1 (One) Time Per Week. 2025    Multiple Vitamin (MULTI VITAMIN DAILY PO) Take  by mouth.   Taking    Prolensa 0.07 % solution Administer 1 drop to both eyes Every Morning.   Taking    Rocklatan 0.02-0.005 % solution Administer 1 Application to both eyes Every Night.   Taking    rosuvastatin (CRESTOR) 10 MG tablet Take 1 tablet by mouth Daily.   Taking    Xigduo XR  MG tablet Take 1 tablet by mouth Daily.   Taking     Allergies   Allergen Reactions    Ketorolac Anaphylaxis    Morphine And Codeine Anaphylaxis    Aspirin Nausea And Vomiting    Metformin And Related Other (See Comments)     Reaction: severe stomach cramp       Review of Systems   All other systems reviewed and are negative.    Vital Signs past 24hrs  BP range: BP: (124-143)/(63-79) 143/78  Pulse range: Heart Rate:  [74-96] 84  Resp rate range: Resp:  [18] 18  Temp range: Temp (24hrs), Av.2 °F (36.8 °C), Min:97.3 °F (36.3 °C), Max:98.8 °F (37.1 °C)    Oxygen range: SpO2:  [94 %-99 %] 97 %;  ;   Device (Oxygen Therapy): room air  91.6 kg (202 lb); Body mass index is 34.67 kg/m².  Net IO Since Admission: 676 mL [02/10/25 1557]        Mechanical Ventilator:     Physical Exam  Vitals reviewed.   Constitutional:       General: She is not in acute distress.     Appearance: Normal appearance.   HENT:      Head: Normocephalic and atraumatic.   Eyes:      Extraocular Movements: Extraocular movements intact.      Conjunctiva/sclera: Conjunctivae normal.      Pupils: Pupils are equal, round, and reactive to light.   Cardiovascular:      Rate and Rhythm: Normal rate and regular rhythm.      Heart sounds: No murmur heard.     No friction rub. No gallop.   Pulmonary:      Effort: Pulmonary effort is normal. No respiratory distress.      Breath sounds: Normal breath sounds. No wheezing,  rhonchi or rales.   Abdominal:      General: Abdomen is flat.      Palpations: Abdomen is soft.      Tenderness: There is no abdominal tenderness.   Musculoskeletal:         General: No swelling or tenderness. Normal range of motion.      Cervical back: Normal range of motion. No rigidity or tenderness.   Skin:     General: Skin is warm and dry.      Findings: No rash.   Neurological:      General: No focal deficit present.      Mental Status: She is alert and oriented to person, place, and time.   Psychiatric:         Mood and Affect: Mood normal.         Behavior: Behavior normal.         Results Review:    I have reviewed the laboratory and imaging data from current admission. My annotations are as noted in assessment and plan.  Result Review:  I have personally reviewed the results from the time of this admission to 2/10/2025 15:57 EST and agree with these findings:  [x]  Laboratory list / accordion  [x]  Microbiology  [x]  Radiology  [x]  EKG/Telemetry   [x]  Cardiology/Vascular   [x]  Pathology  [x]  Old records  []  Other:        Medication Review:  I have reviewed the current MAR. My annotations are as noted in assessment and plan.       Lines, Drains & Airways       Active LDAs       Name Placement date Placement time Site Days    Peripheral IV 02/05/25 2103 Right Antecubital 02/05/25  2103  Antecubital  4    Peripheral IV 02/05/25 2347 Left Antecubital 02/05/25  2347  Antecubital  4                  Diet Orders (active) (From admission, onward)       Start     Ordered    02/06/25 0822  Diet: Diabetic; Consistent Carbohydrate; Fluid Consistency: Thin (IDDSI 0)  Diet Effective Now         02/06/25 0821                  No active isolations    Assessment  Bilateral pulmonary emboli  Right lower extremity DVT  History of asthma/reactive airways disease  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   Found to have right lower extremity DVT and subsequently bilateral pulmonary emboli.  Started on heparin drip and admitted for further management  - Recommend transitioning heparin drip to DOAC when okay by other consultants  - Treatment duration will depend on hematology evaluation and if she has a major reversible cause as right now this does not appear to be the case  - Continue as needed bronchodilators.  She can resume her usual regimen at discharge    Electronically signed by Kash Ambrocio DO, 02/10/25, 3:57 PM EST.      Part of this note may be an electronic transcription/translation of spoken language to printed text using the Dragon Dictation System.

## 2025-02-10 NOTE — PROGRESS NOTES
[facility] GROUP INPATIENT PROGRESS NOTE    Length of Stay:  0 days    CHIEF COMPLAINT:  Bilateral pulmonary emboli, right femoral/popliteal/calf DVT, SVT, history of nonspecific autoimmune disorder    SUBJECTIVE:   Patient with no new complaints today.  She does have some continued mild lower extremity discomfort.    ROS:  Review of Systems   A comprehensive review of systems was obtained with pertinent positive findings as noted in the interval history above.  All other systems negative.    OBJECTIVE:  Vitals:    02/09/25 0714 02/09/25 1319 02/09/25 1940 02/09/25 2311   BP: 97/66 118/67 136/79 126/63   BP Location: Left arm Left arm Left arm Left arm   Patient Position: Lying Lying Lying Lying   Pulse: 74 75 96 82   Resp: 18 18 18 18   Temp: 98.2 °F (36.8 °C) 98.1 °F (36.7 °C) 98.8 °F (37.1 °C) 98.4 °F (36.9 °C)   TempSrc: Oral Oral Oral Oral   SpO2: 96% 98% 98% 94%   Weight:             PHYSICAL EXAMINATION:  General: Alert and orient x 3 no distress  Chest/Lungs: Clear to auscultation bilaterally  Heart: Regular rate and rhythm  Abdomen/GI: Soft nontender nondistended bowel sounds present  Extremities: Trace/1+ edema right lower extremity    DIAGNOSTIC DATA:  Results Review:     I reviewed the patient's new clinical results.    Results from last 7 days   Lab Units 02/10/25  0232 02/09/25  0335 02/08/25  0551   WBC 10*3/mm3 9.77 8.36 10.93*   HEMOGLOBIN g/dL 11.6* 12.1 12.8   HEMATOCRIT % 36.3 35.7 39.9   PLATELETS 10*3/mm3 317 273 302      Results from last 7 days   Lab Units 02/10/25  0232 02/09/25  0335 02/08/25  0551 02/06/25  0906 02/06/25  0616 02/05/25  2100   SODIUM mmol/L 137 138 137   < > 138 141   POTASSIUM mmol/L 3.8 3.9 3.7   < > 3.9 3.9   CHLORIDE mmol/L 105 104 101   < > 104 104   CO2 mmol/L 23.7 24.2 24.1   < > 25.4 27.4   BUN mg/dL 11 11 10   < > 10 12   CREATININE mg/dL 0.72 0.64 0.71   < > 0.75 0.73   CALCIUM mg/dL 8.9 9.0 9.2   < > 8.7 9.0   BILIRUBIN mg/dL  --   --   --   --  0.4 0.5    ALK PHOS U/L  --   --   --   --  114 131*   ALT (SGPT) U/L  --   --   --   --  10 12   AST (SGOT) U/L  --   --   --   --  12 13   GLUCOSE mg/dL 115* 132* 199*   < > 226* 156*    < > = values in this interval not displayed.      Lab Results   Component Value Date    NEUTROABS 4.33 02/10/2025     Results from last 7 days   Lab Units 02/10/25  0232 02/09/25  0832 02/08/25  2135 02/06/25  0611 02/05/25  2100   INR   --   --   --   --  1.10   APTT seconds 83.7* 91.9* 83.2*   < > 30.7    < > = values in this interval not displayed.     Results from last 7 days   Lab Units 02/07/25  2309   MAGNESIUM mg/dL 2.1           Assessment & Plan   ASSESSMENT/PLAN:  This is a 64 y.o. female with:     *Bilateral pulmonary emboli and right lower extremity femoral, popliteal, calf DVT  Patient with no prior history of thrombosis, no known family history of thrombosis  CT angiogram chest on 2/5/2025 with bilateral pulmonary emboli, most prominent involving left main pulmonary artery.  No evidence of right heart strain.  Bilateral lower extremity Doppler on 2/6/2025 with right lower extremity femoral, popliteal, calf DVT  Echocardiogram on 2/10/2025 with ejection fraction 61.8%, no evidence of right heart strain  Patient anticoagulated with heparin drip  Hypercoagulable evaluation on 2/6/2025 with negative factor V Leiden mutation, negative prothrombin gene mutation, negative anticardiolipin antibody panel, negative antibeta 2 GP 1 antibody panel, Antithrombin 101%, protein C activity 114%, protein S activity 74%, protein S antigen free 85%.  Lupus anticoagulant pending  Patient was seen in consultation by Dr. Manzano.  He discussed at least 1 year full dose anticoagulation.  Consideration of oral anticoagulant with Coumadin versus DOAC pending lupus anticoagulant result in light of underlying known autoimmune disorder.  Patient currently continuing on heparin drip.  Awaiting lupus anticoagulant results.  Discussed with lab and final  result may not be available until 2/15/2025.  In the interim, could transition to Lovenox injection 1 mg/kg every 12 hours and discharged home with follow-up next week to decide on Coumadin versus DOAC with Dr. Manzano pending the lupus anticoagulant results.    *Nonspecific autoimmune disorder  Patient is followed in the outpatient setting by Dr. Adams in rheumatology  Patient reports that she is being treated more for rheumatoid arthritis   Patient continues on Remicade in addition to methotrexate 20 mg p.o. weekly    *SVT  Patient was seen by cardiology, was felt to have intermittent SVT, no plans for further evaluation or treatment except Toprol    *Obesity  Contributing factor to thrombotic risk    *Diabetes mellitus type 2      Plan:  Patient currently continuing on heparin drip  Lupus anticoagulant pending  Recommendation to transition to Lovenox 1 mg/kg injection every 12 hours and discharged home on Lovenox  We will arrange outpatient follow-up visit next week with Dr. Manzano at which time lupus anticoagulant result will be available and we can make determination regarding recommended oral anticoagulant (Coumadin versus DOAC) at that time.  Daily CBC, BMP, PTT    Discussed with patient at the bedside.  Discussed with Dr. Manzano.    Notified by lab that lupus anticoagulant result did return this afternoon and was negative.  Discussed with Dr. Manzano and with no definitive evidence of antiphospholipid syndrome we will consider transition to DOAC with Eliquis at time of discharge.  Would recommend standard 10 mg twice daily x 7 days followed by 5 mg twice daily dosing.           Randell Cotto MD

## 2025-02-11 ENCOUNTER — TELEPHONE (OUTPATIENT)
Dept: ONCOLOGY | Facility: CLINIC | Age: 65
End: 2025-02-11
Payer: COMMERCIAL

## 2025-02-11 DIAGNOSIS — I26.99 BILATERAL PULMONARY EMBOLISM: Primary | ICD-10-CM

## 2025-02-11 PROBLEM — E66.01 SEVERE OBESITY (BMI 35.0-35.9 WITH COMORBIDITY): Status: ACTIVE | Noted: 2025-02-11

## 2025-02-11 PROBLEM — I47.10 SVT (SUPRAVENTRICULAR TACHYCARDIA): Status: ACTIVE | Noted: 2025-02-11

## 2025-02-11 PROBLEM — J45.909 ASTHMA: Status: ACTIVE | Noted: 2025-02-11

## 2025-02-11 LAB
ALBUMIN SERPL-MCNC: 3.4 G/DL (ref 3.5–5.2)
ALBUMIN/GLOB SERPL: 1 G/DL
ALP SERPL-CCNC: 109 U/L (ref 39–117)
ALT SERPL W P-5'-P-CCNC: 45 U/L (ref 1–33)
ANION GAP SERPL CALCULATED.3IONS-SCNC: 8.9 MMOL/L (ref 5–15)
APTT PPP: 40.4 SECONDS (ref 22.7–35.4)
AST SERPL-CCNC: 49 U/L (ref 1–32)
BASOPHILS # BLD AUTO: 0.02 10*3/MM3 (ref 0–0.2)
BASOPHILS NFR BLD AUTO: 0.2 % (ref 0–1.5)
BILIRUB SERPL-MCNC: 0.2 MG/DL (ref 0–1.2)
BUN SERPL-MCNC: 12 MG/DL (ref 8–23)
BUN/CREAT SERPL: 15.8 (ref 7–25)
CALCIUM SPEC-SCNC: 9 MG/DL (ref 8.6–10.5)
CHLORIDE SERPL-SCNC: 104 MMOL/L (ref 98–107)
CO2 SERPL-SCNC: 25.1 MMOL/L (ref 22–29)
CREAT SERPL-MCNC: 0.76 MG/DL (ref 0.57–1)
DEPRECATED RDW RBC AUTO: 40.1 FL (ref 37–54)
EGFRCR SERPLBLD CKD-EPI 2021: 88.7 ML/MIN/1.73
EOSINOPHIL # BLD AUTO: 0.15 10*3/MM3 (ref 0–0.4)
EOSINOPHIL NFR BLD AUTO: 1.6 % (ref 0.3–6.2)
ERYTHROCYTE [DISTWIDTH] IN BLOOD BY AUTOMATED COUNT: 12.4 % (ref 12.3–15.4)
GLOBULIN UR ELPH-MCNC: 3.5 GM/DL
GLUCOSE BLDC GLUCOMTR-MCNC: 124 MG/DL (ref 70–130)
GLUCOSE BLDC GLUCOMTR-MCNC: 141 MG/DL (ref 70–130)
GLUCOSE BLDC GLUCOMTR-MCNC: 149 MG/DL (ref 70–130)
GLUCOSE BLDC GLUCOMTR-MCNC: 151 MG/DL (ref 70–130)
GLUCOSE SERPL-MCNC: 124 MG/DL (ref 65–99)
HCT VFR BLD AUTO: 36.4 % (ref 34–46.6)
HGB BLD-MCNC: 11.6 G/DL (ref 12–15.9)
IMM GRANULOCYTES # BLD AUTO: 0.03 10*3/MM3 (ref 0–0.05)
IMM GRANULOCYTES NFR BLD AUTO: 0.3 % (ref 0–0.5)
LYMPHOCYTES # BLD AUTO: 3.86 10*3/MM3 (ref 0.7–3.1)
LYMPHOCYTES NFR BLD AUTO: 41.6 % (ref 19.6–45.3)
MAGNESIUM SERPL-MCNC: 2.1 MG/DL (ref 1.6–2.4)
MCH RBC QN AUTO: 28.5 PG (ref 26.6–33)
MCHC RBC AUTO-ENTMCNC: 31.9 G/DL (ref 31.5–35.7)
MCV RBC AUTO: 89.4 FL (ref 79–97)
MONOCYTES # BLD AUTO: 0.85 10*3/MM3 (ref 0.1–0.9)
MONOCYTES NFR BLD AUTO: 9.2 % (ref 5–12)
NEUTROPHILS NFR BLD AUTO: 4.37 10*3/MM3 (ref 1.7–7)
NEUTROPHILS NFR BLD AUTO: 47.1 % (ref 42.7–76)
NRBC BLD AUTO-RTO: 0 /100 WBC (ref 0–0.2)
PHOSPHATE SERPL-MCNC: 4.5 MG/DL (ref 2.5–4.5)
PLATELET # BLD AUTO: 344 10*3/MM3 (ref 140–450)
PMV BLD AUTO: 9.9 FL (ref 6–12)
POTASSIUM SERPL-SCNC: 3.9 MMOL/L (ref 3.5–5.2)
PROT SERPL-MCNC: 6.9 G/DL (ref 6–8.5)
PS IGG SER-ACNC: 16 UNITS (ref 0–30)
PS IGM SER-ACNC: 33 UNITS (ref 0–30)
RBC # BLD AUTO: 4.07 10*6/MM3 (ref 3.77–5.28)
SODIUM SERPL-SCNC: 138 MMOL/L (ref 136–145)
WBC NRBC COR # BLD AUTO: 9.28 10*3/MM3 (ref 3.4–10.8)

## 2025-02-11 PROCEDURE — G0378 HOSPITAL OBSERVATION PER HR: HCPCS

## 2025-02-11 PROCEDURE — 82948 REAGENT STRIP/BLOOD GLUCOSE: CPT

## 2025-02-11 PROCEDURE — 80053 COMPREHEN METABOLIC PANEL: CPT | Performed by: NURSE PRACTITIONER

## 2025-02-11 PROCEDURE — 96372 THER/PROPH/DIAG INJ SC/IM: CPT

## 2025-02-11 PROCEDURE — 85025 COMPLETE CBC W/AUTO DIFF WBC: CPT | Performed by: INTERNAL MEDICINE

## 2025-02-11 PROCEDURE — 97165 OT EVAL LOW COMPLEX 30 MIN: CPT

## 2025-02-11 PROCEDURE — 85730 THROMBOPLASTIN TIME PARTIAL: CPT | Performed by: INTERNAL MEDICINE

## 2025-02-11 PROCEDURE — 84100 ASSAY OF PHOSPHORUS: CPT | Performed by: NURSE PRACTITIONER

## 2025-02-11 PROCEDURE — 25010000002 ENOXAPARIN PER 10 MG: Performed by: INTERNAL MEDICINE

## 2025-02-11 PROCEDURE — 97530 THERAPEUTIC ACTIVITIES: CPT

## 2025-02-11 PROCEDURE — 83735 ASSAY OF MAGNESIUM: CPT | Performed by: NURSE PRACTITIONER

## 2025-02-11 PROCEDURE — 99214 OFFICE O/P EST MOD 30 MIN: CPT | Performed by: NURSE PRACTITIONER

## 2025-02-11 PROCEDURE — 99232 SBSQ HOSP IP/OBS MODERATE 35: CPT | Performed by: INTERNAL MEDICINE

## 2025-02-11 RX ORDER — METOPROLOL SUCCINATE 25 MG/1
12.5 TABLET, EXTENDED RELEASE ORAL NIGHTLY
Status: DISCONTINUED | OUTPATIENT
Start: 2025-02-11 | End: 2025-02-12 | Stop reason: HOSPADM

## 2025-02-11 RX ADMIN — BROMFENAC SODIUM 1 DROP: 0.7 SOLUTION/ DROPS OPHTHALMIC at 06:26

## 2025-02-11 RX ADMIN — Medication 10 ML: at 08:47

## 2025-02-11 RX ADMIN — Medication 400 MCG: at 08:43

## 2025-02-11 RX ADMIN — ATROPINE SULFATE 1 DROP: 10 SOLUTION/ DROPS OPHTHALMIC at 16:52

## 2025-02-11 RX ADMIN — Medication 10 ML: at 21:22

## 2025-02-11 RX ADMIN — DAPAGLIFLOZIN AND METFORMIN HYDROCHLORIDE 1 TABLET: 10; 1000 TABLET, FILM COATED, EXTENDED RELEASE ORAL at 21:20

## 2025-02-11 RX ADMIN — ACETAMINOPHEN 325MG 650 MG: 325 TABLET ORAL at 04:10

## 2025-02-11 RX ADMIN — Medication 1 TABLET: at 08:43

## 2025-02-11 RX ADMIN — ATROPINE SULFATE 1 DROP: 10 SOLUTION/ DROPS OPHTHALMIC at 08:49

## 2025-02-11 RX ADMIN — TRAMADOL HYDROCHLORIDE 50 MG: 50 TABLET ORAL at 23:21

## 2025-02-11 RX ADMIN — ATROPINE SULFATE 1 DROP: 10 SOLUTION/ DROPS OPHTHALMIC at 21:20

## 2025-02-11 RX ADMIN — BRIMONIDINE TARTRATE 1 DROP: 1 SOLUTION/ DROPS OPHTHALMIC at 21:21

## 2025-02-11 RX ADMIN — DOXYCYCLINE 50 MG: 25 FOR SUSPENSION ORAL at 21:19

## 2025-02-11 RX ADMIN — DORZOLAMIDE HYDROCHLORIDE AND TIMOLOL MALEATE 1 DROP: 20; 5 SOLUTION/ DROPS OPHTHALMIC at 08:49

## 2025-02-11 RX ADMIN — ENOXAPARIN SODIUM 90 MG: 100 INJECTION SUBCUTANEOUS at 04:11

## 2025-02-11 RX ADMIN — METOPROLOL SUCCINATE 12.5 MG: 25 TABLET, EXTENDED RELEASE ORAL at 21:20

## 2025-02-11 RX ADMIN — ROSUVASTATIN CALCIUM 10 MG: 5 TABLET, FILM COATED ORAL at 08:43

## 2025-02-11 RX ADMIN — BRIMONIDINE TARTRATE 1 DROP: 1 SOLUTION/ DROPS OPHTHALMIC at 08:48

## 2025-02-11 RX ADMIN — ENOXAPARIN SODIUM 90 MG: 100 INJECTION SUBCUTANEOUS at 16:52

## 2025-02-11 RX ADMIN — DORZOLAMIDE HYDROCHLORIDE AND TIMOLOL MALEATE 1 DROP: 20; 5 SOLUTION/ DROPS OPHTHALMIC at 21:20

## 2025-02-11 RX ADMIN — DOXYCYCLINE 50 MG: 25 FOR SUSPENSION ORAL at 08:43

## 2025-02-11 NOTE — TELEPHONE ENCOUNTER
----- Message from Sally Manzano sent at 2/7/2025  6:21 PM EST -----  Regarding: Follow-up appointment  Petra,  please call patient on Monday to schedule appointment with me in about 3 to 4 weeks.  2 units appointment.    Lindsey, we need a CBC when she comes back to see us.      Thank you very much!    Jose Juan

## 2025-02-11 NOTE — PLAN OF CARE
Goal Outcome Evaluation:      63 yo female admitted 2/5 with mohini pulmonary embolism. On lovenox. Occasional pain to R leg r/t DVT, PRN tylenol and tramadol given per orders. VS stable, room air, A&Ox4.

## 2025-02-11 NOTE — TELEPHONE ENCOUNTER
Called and mailed appts to patient as well as they should get follow up appts from the hospital when the patient is discharged
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
## 2025-02-11 NOTE — PROGRESS NOTES
[facility] GROUP INPATIENT PROGRESS NOTE    Length of Stay:  0 days    CHIEF COMPLAINT:  Bilateral pulmonary emboli, right femoral/popliteal/calf DVT, SVT, history of nonspecific autoimmune disorder    SUBJECTIVE:   Patient with no new complaints today, does continue with reduced stamina, fatigue.  Continues with right leg pain intermittently which is positional.    ROS:  Review of Systems   Comprehensive review of systems was obtained with pertinent positive findings as noted in interval history above.  All other systems negative.    OBJECTIVE:  Vitals:    02/10/25 1315 02/10/25 1916 02/10/25 2310 02/11/25 0408   BP: 143/78 124/73 115/64 113/68   BP Location: Left arm Left arm Left arm Left arm   Patient Position: Lying Lying Lying Lying   Pulse: 84 83 73 68   Resp: 18 18 18 16   Temp: 97.3 °F (36.3 °C) 98.6 °F (37 °C) 98.2 °F (36.8 °C) 97.5 °F (36.4 °C)   TempSrc: Oral Oral Oral Oral   SpO2: 97% 93% 96% 98%   Weight:       Height:             PHYSICAL EXAMINATION:  General: Alert and orient x 3 no distress  Chest/Lungs: Clear to auscultation bilaterally  Heart: Regular rate and rhythm  Abdomen/GI: Soft nontender nondistended bowel sounds present  Extremities: Trace/1+ edema right lower extremity    Patient was examined today, unchanged from above    DIAGNOSTIC DATA:  Results Review:     I reviewed the patient's new clinical results.    Results from last 7 days   Lab Units 02/11/25  0355 02/10/25  0232 02/09/25  0335   WBC 10*3/mm3 9.28 9.77 8.36   HEMOGLOBIN g/dL 11.6* 11.6* 12.1   HEMATOCRIT % 36.4 36.3 35.7   PLATELETS 10*3/mm3 344 317 273      Results from last 7 days   Lab Units 02/11/25  0355 02/10/25  2050 02/10/25  0232 02/09/25  0335 02/06/25  0906 02/06/25  0616 02/05/25  2100   SODIUM mmol/L 138  --  137 138   < > 138 141   POTASSIUM mmol/L 3.9 4.2 3.8 3.9   < > 3.9 3.9   CHLORIDE mmol/L 104  --  105 104   < > 104 104   CO2 mmol/L 25.1  --  23.7 24.2   < > 25.4 27.4   BUN mg/dL 12  --  11 11   <  > 10 12   CREATININE mg/dL 0.76  --  0.72 0.64   < > 0.75 0.73   CALCIUM mg/dL 9.0  --  8.9 9.0   < > 8.7 9.0   BILIRUBIN mg/dL 0.2  --   --   --   --  0.4 0.5   ALK PHOS U/L 109  --   --   --   --  114 131*   ALT (SGPT) U/L 45*  --   --   --   --  10 12   AST (SGOT) U/L 49*  --   --   --   --  12 13   GLUCOSE mg/dL 124*  --  115* 132*   < > 226* 156*    < > = values in this interval not displayed.      Lab Results   Component Value Date    NEUTROABS 4.37 02/11/2025     Results from last 7 days   Lab Units 02/11/25  0355 02/10/25  0232 02/09/25  0832 02/06/25  0611 02/05/25  2100   INR   --   --   --   --  1.10   APTT seconds 40.4* 83.7* 91.9*   < > 30.7    < > = values in this interval not displayed.     Results from last 7 days   Lab Units 02/11/25  0355   MAGNESIUM mg/dL 2.1           Assessment & Plan   ASSESSMENT/PLAN:  This is a 64 y.o. female with:     *Bilateral pulmonary emboli and right lower extremity femoral, popliteal, calf DVT  Patient with no prior history of thrombosis, no known family history of thrombosis  CT angiogram chest on 2/5/2025 with bilateral pulmonary emboli, most prominent involving left main pulmonary artery.  No evidence of right heart strain.  Bilateral lower extremity Doppler on 2/6/2025 with right lower extremity femoral, popliteal, calf DVT  Echocardiogram on 2/10/2025 with ejection fraction 61.8%, no evidence of right heart strain  Patient anticoagulated with heparin drip  Hypercoagulable evaluation on 2/6/2025 with negative factor V Leiden mutation, negative prothrombin gene mutation, negative anticardiolipin antibody panel, negative antibeta 2 GP 1 antibody panel, negative lupus anticoagulant, Antithrombin 101%, protein C activity 114%, protein S activity 74%, protein S antigen free 85%.    Patient transition from heparin drip to Lovenox 1 mg/kg (90 mg) every 12 hours on 2/10/2025  With negative lupus anticoagulant, patient does not have evidence of antiphospholipid syndrome.   Discussed with Dr. Manzano and we both agreed patient could be transitioned at this point to DOAC with Eliquis at discharge, 10 mg twice daily x 7 days followed by 5 mg twice daily.  We will reschedule follow-up with Dr. Manzano in a few weeks for follow-up.    *Nonspecific autoimmune disorder  Patient is followed in the outpatient setting by Dr. Adams in rheumatology  Patient reports that she is being treated more for rheumatoid arthritis   Patient continues on Remicade in addition to methotrexate 20 mg p.o. weekly    *SVT  Patient was seen by cardiology, was felt to have intermittent SVT, no plans for further evaluation or treatment except Toprol    *Obesity  Contributing factor to thrombotic risk    *Diabetes mellitus type 2      Plan:  Patient currently receiving Lovenox 1 mg/kg (90 mg) injection every 12 hours   Patient could transition to DOAC with Eliquis at discharge now with negative lupus anticoagulant and no evidence of antiphospholipid syndrome.  Would recommend 10 mg twice daily x 7 days followed by 5 mg twice daily  We will change follow-up with Dr. Manzano to be seen in the next 2 to 3 weeks.  We will sign off, please call with further questions    Discussed with patient at bedside           Randell Cotto MD
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
## 2025-02-11 NOTE — SIGNIFICANT NOTE
02/11/25 0936   OTHER   Discipline physical therapist   Therapy Assessment/Plan (PT)   Criteria for Skilled Interventions Met (PT) no problems identified which require skilled intervention     65 yo admitted with pulmonary emboli . Per adult pt profile, pt with no mobility issues PTA. Per adult PCS, pt is up indep and with [facility] score of 24. No indication for acute PT. Continue to facilitate HLM per [facility] (250' or more)

## 2025-02-11 NOTE — PLAN OF CARE
Goal Outcome Evaluation:  Plan of Care Reviewed With: patient        Progress: improving  Outcome Evaluation: 64 y.o. female admitted to [facility] for RLE pain.  Post work up reveals bilateral pulmonary embolism.  At baseline, Patient lives alone at home ([address]) Independent with ADLs and functional mobility (No AD).  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  A&Ox4, BUE WFL, 5/5.  Patient states she feels a little stiff from laying in bed, but very close to her baseline.  Patient has been taking self to/from bathroom.  Patient transition to EOB with Mon (I).  STS from EOb with Mod (I) and no AD.  Patient ambulates to/from bathroom and end in recliner chair.  No LOB or unsteadiness.  RLE a little sore with movement.  No acute OT needs at this time. Patient plans to d/c to home. OT to sign off.    Anticipated Discharge Disposition (OT): home

## 2025-02-11 NOTE — PLAN OF CARE
Goal Outcome Evaluation:  Plan of Care Reviewed With: patient        Progress: no change  Outcome Evaluation: vitals stable.  pt expressed pain.  meds given per orders.  the pt had a few runs of SVT.  APRN notified.  will continue to monitor.
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
## 2025-02-12 ENCOUNTER — READMISSION MANAGEMENT (OUTPATIENT)
Dept: CALL CENTER | Facility: HOSPITAL | Age: 65
End: 2025-02-12
Payer: COMMERCIAL

## 2025-02-12 ENCOUNTER — APPOINTMENT (OUTPATIENT)
Dept: CARDIOLOGY | Facility: HOSPITAL | Age: 65
End: 2025-02-12
Payer: COMMERCIAL

## 2025-02-12 VITALS
BODY MASS INDEX: 34.49 KG/M2 | RESPIRATION RATE: 18 BRPM | TEMPERATURE: 97.7 F | SYSTOLIC BLOOD PRESSURE: 109 MMHG | OXYGEN SATURATION: 96 % | HEART RATE: 60 BPM | WEIGHT: 202 LBS | DIASTOLIC BLOOD PRESSURE: 65 MMHG | HEIGHT: 64 IN

## 2025-02-12 LAB
ANION GAP SERPL CALCULATED.3IONS-SCNC: 10.3 MMOL/L (ref 5–15)
APTT PPP: 38.3 SECONDS (ref 22.7–35.4)
BASOPHILS # BLD AUTO: 0.02 10*3/MM3 (ref 0–0.2)
BASOPHILS NFR BLD AUTO: 0.2 % (ref 0–1.5)
BUN SERPL-MCNC: 14 MG/DL (ref 8–23)
BUN/CREAT SERPL: 19.7 (ref 7–25)
CALCIUM SPEC-SCNC: 9.1 MG/DL (ref 8.6–10.5)
CHLORIDE SERPL-SCNC: 103 MMOL/L (ref 98–107)
CO2 SERPL-SCNC: 23.7 MMOL/L (ref 22–29)
CREAT SERPL-MCNC: 0.71 MG/DL (ref 0.57–1)
DEPRECATED RDW RBC AUTO: 39.8 FL (ref 37–54)
EGFRCR SERPLBLD CKD-EPI 2021: 96.3 ML/MIN/1.73
EOSINOPHIL # BLD AUTO: 0.11 10*3/MM3 (ref 0–0.4)
EOSINOPHIL NFR BLD AUTO: 1.2 % (ref 0.3–6.2)
ERYTHROCYTE [DISTWIDTH] IN BLOOD BY AUTOMATED COUNT: 12.7 % (ref 12.3–15.4)
GLUCOSE BLDC GLUCOMTR-MCNC: 114 MG/DL (ref 70–130)
GLUCOSE BLDC GLUCOMTR-MCNC: 116 MG/DL (ref 70–130)
GLUCOSE SERPL-MCNC: 123 MG/DL (ref 65–99)
HCT VFR BLD AUTO: 35.1 % (ref 34–46.6)
HGB BLD-MCNC: 12 G/DL (ref 12–15.9)
IMM GRANULOCYTES # BLD AUTO: 0.02 10*3/MM3 (ref 0–0.05)
IMM GRANULOCYTES NFR BLD AUTO: 0.2 % (ref 0–0.5)
LYMPHOCYTES # BLD AUTO: 3.84 10*3/MM3 (ref 0.7–3.1)
LYMPHOCYTES NFR BLD AUTO: 43.3 % (ref 19.6–45.3)
MCH RBC QN AUTO: 30 PG (ref 26.6–33)
MCHC RBC AUTO-ENTMCNC: 34.2 G/DL (ref 31.5–35.7)
MCV RBC AUTO: 87.8 FL (ref 79–97)
MONOCYTES # BLD AUTO: 0.9 10*3/MM3 (ref 0.1–0.9)
MONOCYTES NFR BLD AUTO: 10.1 % (ref 5–12)
NEUTROPHILS NFR BLD AUTO: 3.98 10*3/MM3 (ref 1.7–7)
NEUTROPHILS NFR BLD AUTO: 45 % (ref 42.7–76)
NRBC BLD AUTO-RTO: 0 /100 WBC (ref 0–0.2)
PLATELET # BLD AUTO: 364 10*3/MM3 (ref 140–450)
PMV BLD AUTO: 9.5 FL (ref 6–12)
POTASSIUM SERPL-SCNC: 4.1 MMOL/L (ref 3.5–5.2)
RBC # BLD AUTO: 4 10*6/MM3 (ref 3.77–5.28)
SODIUM SERPL-SCNC: 137 MMOL/L (ref 136–145)
WBC NRBC COR # BLD AUTO: 8.87 10*3/MM3 (ref 3.4–10.8)

## 2025-02-12 PROCEDURE — 85730 THROMBOPLASTIN TIME PARTIAL: CPT | Performed by: INTERNAL MEDICINE

## 2025-02-12 PROCEDURE — 94760 N-INVAS EAR/PLS OXIMETRY 1: CPT

## 2025-02-12 PROCEDURE — 85025 COMPLETE CBC W/AUTO DIFF WBC: CPT | Performed by: INTERNAL MEDICINE

## 2025-02-12 PROCEDURE — 25010000002 ENOXAPARIN PER 10 MG: Performed by: INTERNAL MEDICINE

## 2025-02-12 PROCEDURE — G0378 HOSPITAL OBSERVATION PER HR: HCPCS

## 2025-02-12 PROCEDURE — 96372 THER/PROPH/DIAG INJ SC/IM: CPT

## 2025-02-12 PROCEDURE — 82948 REAGENT STRIP/BLOOD GLUCOSE: CPT

## 2025-02-12 PROCEDURE — 94618 PULMONARY STRESS TESTING: CPT

## 2025-02-12 PROCEDURE — 99214 OFFICE O/P EST MOD 30 MIN: CPT

## 2025-02-12 PROCEDURE — 80048 BASIC METABOLIC PNL TOTAL CA: CPT | Performed by: INTERNAL MEDICINE

## 2025-02-12 PROCEDURE — 94799 UNLISTED PULMONARY SVC/PX: CPT

## 2025-02-12 PROCEDURE — 93246 EXT ECG>7D<15D RECORDING: CPT

## 2025-02-12 RX ORDER — ACETAMINOPHEN 325 MG/1
650 TABLET ORAL EVERY 6 HOURS PRN
Start: 2025-02-12

## 2025-02-12 RX ORDER — LANCETS 30 GAUGE
EACH MISCELLANEOUS
Qty: 100 EACH | Refills: 0 | Status: SHIPPED | OUTPATIENT
Start: 2025-02-12

## 2025-02-12 RX ORDER — BENZONATATE 200 MG/1
200 CAPSULE ORAL 3 TIMES DAILY PRN
Qty: 30 CAPSULE | Refills: 0 | Status: SHIPPED | OUTPATIENT
Start: 2025-02-12

## 2025-02-12 RX ORDER — BLOOD-GLUCOSE METER
EACH MISCELLANEOUS
Qty: 1 KIT | Refills: 0 | Status: SHIPPED | OUTPATIENT
Start: 2025-02-12

## 2025-02-12 RX ORDER — TRAMADOL HYDROCHLORIDE 50 MG/1
50-100 TABLET ORAL EVERY 6 HOURS PRN
Qty: 24 TABLET | Refills: 0 | Status: SHIPPED | OUTPATIENT
Start: 2025-02-12 | End: 2025-02-18

## 2025-02-12 RX ORDER — METOPROLOL SUCCINATE 25 MG/1
12.5 TABLET, EXTENDED RELEASE ORAL NIGHTLY
Qty: 30 TABLET | Refills: 2 | Status: SHIPPED | OUTPATIENT
Start: 2025-02-12

## 2025-02-12 RX ADMIN — Medication 1 TABLET: at 08:18

## 2025-02-12 RX ADMIN — DOXYCYCLINE 50 MG: 25 FOR SUSPENSION ORAL at 08:18

## 2025-02-12 RX ADMIN — DORZOLAMIDE HYDROCHLORIDE AND TIMOLOL MALEATE 1 DROP: 20; 5 SOLUTION/ DROPS OPHTHALMIC at 08:18

## 2025-02-12 RX ADMIN — BROMFENAC SODIUM 1 DROP: 0.7 SOLUTION/ DROPS OPHTHALMIC at 06:15

## 2025-02-12 RX ADMIN — ATROPINE SULFATE 1 DROP: 10 SOLUTION/ DROPS OPHTHALMIC at 08:18

## 2025-02-12 RX ADMIN — BRIMONIDINE TARTRATE 1 DROP: 1 SOLUTION/ DROPS OPHTHALMIC at 08:18

## 2025-02-12 RX ADMIN — ACETAMINOPHEN 325MG 650 MG: 325 TABLET ORAL at 11:25

## 2025-02-12 RX ADMIN — TRAMADOL HYDROCHLORIDE 50 MG: 50 TABLET ORAL at 14:01

## 2025-02-12 RX ADMIN — Medication 10 ML: at 08:18

## 2025-02-12 RX ADMIN — ENOXAPARIN SODIUM 90 MG: 100 INJECTION SUBCUTANEOUS at 04:28

## 2025-02-12 RX ADMIN — CETIRIZINE HYDROCHLORIDE 5 MG: 10 TABLET, FILM COATED ORAL at 08:17

## 2025-02-12 RX ADMIN — Medication 400 MCG: at 08:17

## 2025-02-12 RX ADMIN — Medication 1 TABLET: at 08:17

## 2025-02-12 RX ADMIN — ROSUVASTATIN CALCIUM 10 MG: 5 TABLET, FILM COATED ORAL at 08:18

## 2025-02-12 NOTE — PROGRESS NOTES
Exercise Oximetry    Patient Name:Lala Dejesus   MRN: [id number]   Date: 02/12/25             ROOM AIR BASELINE   SpO2% 97   Heart Rate 76   Blood Pressure      EXERCISE ON ROOM AIR SpO2% EXERCISE ON O2 @  LPM SpO2%   1 MINUTE 100 1 MINUTE    2 MINUTES 99 2 MINUTES    3 MINUTES 97 3 MINUTES    4 MINUTES 96 4 MINUTES    5 MINUTES 98 5 MINUTES    6 MINUTES 96 6 MINUTES               Distance Walked  2 laps around nurses station Distance Walked   Dyspnea (Amanda Scale)  4 Dyspnea (Amanda Scale)   Fatigue (Amanda Scale)  4 Fatigue (Amanda Scale)   SpO2% Post Exercise  97 SpO2% Post Exercise   HR Post Exercise  82 HR Post Exercise   Time to Recovery  5 minutes  Time to Recovery     Comments: Patient walked two laps around the nurses station on room air. Tolerated walk well. Pulse ox and forehead probe used during walk.

## 2025-02-12 NOTE — CASE MANAGEMENT/SOCIAL WORK
Continued Stay Note  [facility]     Patient Name: Lala Dejesus  MRN: [id number]  Today's Date: 2/12/2025    Admit Date: 2/5/2025    Plan: Home via family, [facility] referral pending.   Discharge Plan       Row Name 02/12/25 1012       Plan    Plan Home via family, [facility] referral pending.    Patient/Family in Agreement with Plan yes    Plan Comments CCP met with pt at the bedside to confirm DC. Pt confirmed DC plan is to return home, family to transport. CCP discussed MD recs for HH at DC; pt is agreeable and denies preference. CCP also discussed cost of Eliquis; pt is agreeable to cost. [facility]  notified of referral. [facility] STILL/[facility]                   Discharge Codes    No documentation.                 Expected Discharge Date and Time       Expected Discharge Date Expected Discharge Time    Feb 12, 2025               Gaby Woods RN
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
## 2025-02-12 NOTE — DISCHARGE PLACEMENT REQUEST
"Mary Dejesus (64 y.o. Female)       Date of Birth   1960    Social Security Number       Address   [address] [place] [address]    Home Phone   [phone]    MRN   [id number]       Quaker   Non-Gnosticist    Marital Status   Single                            Admission Date   2/5/25    Admission Type   Emergency    Admitting Provider   Varghese Borden MD    Attending Provider   Varghese Borden MD    Department, Room/Bed   [address], S[address]/1       Discharge Date       Discharge Disposition       Discharge Destination                                 Attending Provider: Varghese Borden MD    Allergies: Ketorolac, Morphine And Codeine, Aspirin, Metformin And Related    Isolation: None   Infection: None   Code Status: CPR    Ht: 162.6 cm (64\")   Wt: 91.6 kg (202 lb)    Admission Cmt: None   Principal Problem: Bilateral pulmonary embolism [I26.99]                   Active Insurance as of 2/5/2025       Primary Coverage       Payor Plan Insurance Group Employer/Plan Group    [place] [id number]       Payor Plan Address Payor Plan Phone Number Payor Plan Fax Number Effective Dates    [address]   8/22/2004 - None Entered    [facility] [id number]         Subscriber Name Subscriber Birth Date Member ID       MARY DEJESUS 1960 [id number]                     Emergency Contacts        (Rel.) Home Phone Work Phone Mobile Phone    Karina Dejesus (Sister) [phone] -- [phone]    Mustapha Woodard (Friend) -- -- [phone]                "

## 2025-02-12 NOTE — PROGRESS NOTES
[facility]  [phone]  Dr. Kash Ambrocio     Subjective:  LOS: 0    Chief Complaint: Right leg pain    Patient doing well today.  States that she had some NSVT overnight.    Objective   Vital Signs past 24hrs  Temp range: Temp (24hrs), Av.9 °F (36.6 °C), Min:97.5 °F (36.4 °C), Max:98.2 °F (36.8 °C)    BP range: BP: (113-121)/(64-71) 121/71  Pulse range: Heart Rate:  [68-77] 77  Resp rate range: Resp:  [16-20] 20  Device (Oxygen Therapy): room air   Oxygen range:SpO2:  [96 %-100 %] 100 %   Mechanical Ventilator:     Physical Exam  Vitals reviewed.   Constitutional:       General: She is not in acute distress.     Appearance: Normal appearance.   HENT:      Head: Normocephalic and atraumatic.   Eyes:      Extraocular Movements: Extraocular movements intact.      Pupils: Pupils are equal, round, and reactive to light.   Cardiovascular:      Rate and Rhythm: Normal rate and regular rhythm.      Heart sounds: No murmur heard.     No friction rub. No gallop.   Pulmonary:      Effort: Pulmonary effort is normal. No respiratory distress.      Breath sounds: No wheezing, rhonchi or rales.   Skin:     General: Skin is warm and dry.      Findings: No rash.   Neurological:      General: No focal deficit present.      Mental Status: She is alert and oriented to person, place, and time.       Results Review:    I have reviewed the laboratory and imaging data since the last note by [facility] physician.  My annotations are noted in assessment and plan.      Result Review:  I have personally reviewed the results from last note by [facility] physician to 2025 19:39 EST and agree with these findings:  [x]  Laboratory list / accordion  [x]  Microbiology  [x]  Radiology  [x]  EKG/Telemetry   [x]  Cardiology/Vascular   [x]  Pathology  [x]  Old records  []  Other:    Medication Review:  I have reviewed the current MAR.  My annotations are noted in assessment and plan.    atropine, 1 drop, Both Eyes, TID  brimonidine, 1  drop, Both Eyes, BID  calcium 500 mg vitamin D 5 mcg (200 UT), 1 tablet, Oral, Daily  cetirizine, 5 mg, Oral, BID  dapagliflozin-metformin HCl ER, 1 tablet, Oral, Daily Before Supper  dorzolamide-timolol, 1 drop, Both Eyes, BID  doxycycline, 50 mg, Oral, BID  enoxaparin, 1 mg/kg, Subcutaneous, Q12H  folic acid, 400 mcg, Oral, Daily  Lifitegrast, 1 drop, Both Eyes, Q48H  methotrexate, 20 mg, Oral, Weekly  metoprolol succinate XL, 12.5 mg, Oral, Nightly  multivitamin, 1 tablet, Oral, Daily  Bromfenac Sodium, 1 drop, Both Eyes, Q AM  Netarsudil-Latanoprost, 1 drop, Both Eyes, Nightly  rosuvastatin, 10 mg, Oral, Daily  sodium chloride, 10 mL, Intravenous, Q12H           Lines, Drains & Airways       Active LDAs       Name Placement date Placement time Site Days    Peripheral IV 02/05/25 2347 Left Antecubital 02/05/25  2347  Antecubital  5                  No active isolations  Diet Orders (active) (From admission, onward)       Start     Ordered    02/06/25 0822  Diet: Diabetic; Consistent Carbohydrate; Fluid Consistency: Thin (IDDSI 0)  Diet Effective Now         02/06/25 0821                      Assessment  Bilateral pulmonary emboli  Right lower extremity DVT  History of asthma/reactive airways disease  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  Found to have right lower extremity DVT and subsequently bilateral pulmonary emboli.  Started on heparin drip and admitted for further management  - Recommend transitioning to DOAC when okay by other consultants  - Treatment duration will depend on hematology evaluation and if she has a major reversible cause as right now this does not appear to be the case  - Continue as needed bronchodilators.  She can resume her usual regimen at discharge  -Pulmonary to sign off.  I have left my contact information for our office in the discharge paperwork under follow-up  providers.  I discussed this with the patient and stated that she can call to make an appointment with us at her leisure for sleep evaluation or her asthma/airways disease.  Please call with any questions or concerns.      Kash Ambrocio DO   02/11/25  19:39 EST      Part of this note may be an electronic transcription/translation of spoken language to printed text using the Dragon Dictation System.

## 2025-02-13 ENCOUNTER — TELEPHONE (OUTPATIENT)
Dept: ONCOLOGY | Facility: CLINIC | Age: 65
End: 2025-02-13
Payer: COMMERCIAL

## 2025-02-13 NOTE — CASE MANAGEMENT/SOCIAL WORK
Continued Stay Note  [facility]     Patient Name: Lala Dejesus  MRN: [id number]  Today's Date: 2/13/2025    Admit Date: 2/5/2025    Plan: Home with [facility].   Discharge Plan       Row Name 02/13/25 0853       Plan    Plan Home with [facility].    Patient/Family in Agreement with Plan yes    Plan Comments Epic denial from [facility]. [facility] notified and can accept pt. [facility] STILL/CCP    Final Discharge Disposition Code 06 - home with home health care    Final Note Home with [facility]. No additional CCP needs.                   Discharge Codes    No documentation.                 Expected Discharge Date and Time       Expected Discharge Date Expected Discharge Time    Feb 12, 2025               Gaby Woods RN

## 2025-02-13 NOTE — OUTREACH NOTE
Prep Survey      Flowsheet Row Responses   [facility] patient discharged from? [facility]   Is LACE score < 7 ? No   Eligibility Readm Mgmt   Discharge diagnosis Bilateral pulmonary embolism   Does the patient have one of the following disease processes/diagnoses(primary or secondary)? Other   Does the patient have Home health ordered? Yes   What is the Home health agency?  [facility]   Is there a DME ordered? No   Prep survey completed? Yes            JEANNINE GARCIA - Registered Nurse

## 2025-02-13 NOTE — TELEPHONE ENCOUNTER
Caller: Lala Dejesus    Relationship to patient: Self    Best call back number: [phone]    Chief complaint: CANC. - R/S - [place] BE OUT OF TOWN     Type of visit: LAB & F/U 2    Requested date: 3/4 - 3/12 ANYTIME     If rescheduling, when is the original appointment: 3/3/25

## 2025-02-16 NOTE — DISCHARGE SUMMARY
[facility]  INTERNAL MEDICINE  NARAYAN LAMB MD  [address] [place] [address]  Phone [phone] Fax [phone]  E-mail:  [email or web address]    [facility]   DISCHARGE SUMMARY  NARAYAN LAMB MD      Date of Discharge:  2/12/2025    Discharge Diagnosis:     Bilateral pulmonary embolism    Hyperglycemia due to diabetes mellitus    Autoimmune disease    Acute deep vein thrombosis (DVT) of femoral vein of right lower extremity    SVT (supraventricular tachycardia)    Gastroparesis with metabolic syndrome    Stricture of esophagus    Hyperlipidemia    Severe obesity (BMI 35.0-35.9 with comorbidity)    Asthma    Paradoxical insomnia    MARIA TERESA (generalized anxiety disorder)      Procedures Performed    1.  Troponin on admission less than 6.0  2.  Electrolytes stable throughout course of hospitalization with sodium 137 and potassium 4.1 at discharge.  3.  Glucose ranging from a high of 186 to a low of 141 over course of hospital stay.  4.  Calcium level ranging from a low of 8.5 to a normal level of 9.1 at time of discharge  5.  Magnesium level normal at 2.1  6.  Phosphorus level at 4.5  7.  Liver function test within normal limits except for very mild elevation of AST at 49 and ALT at 45 at time of discharge which will need follow-up in office in future.  8.  Hemoglobin A1c 8.50 with plans for patient to visit endocrinology clinic within next 2 weeks.  9.  Lipid profile showing cholesterol 177, HDL 51, , triglycerides 96, and LDL/HDL ratio 2.09.  10.  Anticardiolipin antibodies all negative.  11.  Antiphospholipid Leslie Alberto antibodies all negative except IgM which was slightly up at 33  12.  Beta-2 glycoprotein antibodies normal  13.  Factor II and factor V Leiden were negative or normal  14.  D-dimer slightly elevated 2.86  15.  Antithrombin activity 101  16.  Dilute PT 38.4  17.  PTT 45.7  18.  Protein C activity normal  19.  Protein S  activity normal  20.  Lupus anticoagulant unremarkable.  21.  Patient stabilized on heparin drip after clot discovered on CT angiogram and venous Doppler with transition to oral Eliquis prior to discharge  22.  White blood cell count ranging from high of 10.93 down to 8.87 prior to discharge  23.  Hemoglobin ranging from high of 13.1 down to 12.0 prior to discharge  24.  Platelet count normal at 364,000  25.  HLA tissue typing normal  26.  Hexagonal phase phospholipid normal  27.  EKG showing sinus tachycardia with PACs and left bundle branch block heart rate 104 unchanged from previous tracings  28.  Echocardiogram 2D with Doppler showing systolic function normal with ejection fraction 61.8% and left ventricular diastolic function also normal.  There is mild calcification of aortic valve.  29.  Holter monitor x 14 days placed at time of discharge  30.  Venous Doppler at time of admission showing acute right lower extremity deep vein thrombosis and proximal femoral, mid femoral and distal femoral as well as and popliteal and gastrocnemius.  All other right-sided veins appear normal       Procedures  Imaging Results (All)       Procedure Component Value Units Date/Time    CT Angiogram Chest [[id number]] Collected: 02/05/25 2257     Updated: 02/05/25 2305    Narrative:      CT ANGIOGRAM OF THE CHEST     HISTORY: Chest pain     COMPARISON: None available.     TECHNIQUE: Axial CT imaging was obtained through the thorax. IV contrast  was administered. Three-D reformatted images were obtained.     FINDINGS:  There are bilateral pulmonary emboli, with the most proximal thrombus  noted within the main left pulmonary artery. There is no evidence of  right heart strain. The thyroid gland, trachea, and esophagus appear  unremarkable. No definite coronary artery calcifications are seen.  Mediastinal lymph nodes do not appear pathologically enlarged. I don't  see any evidence of pulmonary infarction. Images through the  upper  abdomen do not demonstrate any acute abnormalities. No acute osseous  abnormalities are seen.       Impression:      Bilateral pulmonary emboli, without evidence of right heart strain or  pulmonary infarction.     FINDINGS were called to Dr. Tian at 11:00 p.m.     Radiation dose reduction techniques were utilized, including automated  exposure control and exposure modulation based on body size.        This report was finalized on 2/5/2025 11:02 PM by Dr. Georgina Hicks M.D on Workstation: [id number]       XR Chest 1 View [[id number]] Collected: 02/05/25 2150     Updated: 02/05/25 2154    Narrative:      SINGLE VIEW OF THE CHEST     HISTORY: Chest pain     COMPARISON: July 19, 2024     FINDINGS:  Heart size is within normal limits. No pneumothorax, pleural effusion,  or acute infiltrate is seen. There is calcification of the aorta.       Impression:      No acute findings.     This report was finalized on 2/5/2025 9:50 PM by Dr. Georgina Hicks M.D on Workstation: [id number]                 Treatment Team at Hospital  Treatment Team:   Consulting Physician: Maribel Serra MD  Consulting Physician: Rell Tavarez MD  Admitting Provider: Varghese Borden MD      Presenting Problem/History of Present Illness  Chief Complaint   Patient presents with    Leg Pain     Hospital Course                Chief Complaint:   Right leg pain     History of Present Illness:     Subjective  Interval History: Patient is a 64 y.o.female who presented with symptoms of right leg pain that were present for several days before ER presentation of the patient.  Over the weekend prior to admission patient had taken a walk outside and noticed that after the walk she was having some pain in the right lower extremity particularly in the area of upper calf and knee.  Patient believes she had pulled a muscle and took anti-inflammatories suspecting that she had inflammation in her knee due to her  autoimmune/connective tissue disorder which she is followed for by Dr. Adams.  Patient's pain however continued to persist for the next several days and at that point her family actually called my office and was told to take her to the emergency room for evaluation.  In the emergency room patient was noted to have an elevated D-dimer and a CTA that was done and revealed that she had evidence of bilateral pulmonary emboli.  Patient also had tenderness of her medial calf on the right side and some mild edema of the right lower extremity.  Patient was placed on a heparin drip and was admitted for further workup and evaluation.  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  She does not know of a specific autoimmune disease that she has but her condition has caused significant inflammation of her right eye, her right hand and arm, and her right knee.  She has been seen here in town by Dr. Adams but has also been seen at [facility] for the right eye inflammation and autoimmune disorder.  At this point she does seem to be quite stable.  Please review patient's complete problem list for details of other medical illnesses.     In the hospital, after admission through the ER on heparin drip, patient has been followed by hematology Dr. Manzano and more recently by Dr. Cotto.  A complete coagulopathy workup was ordered and final results of some of those tests are still pending.  Hematology has initially indicated that patient should be on IV Lovenox at the time of discharge but after results came back today showing that the patient had lupus anticoagulation result which was negative, it was felt by Nathaniel Manzano and Yadiel that patient has no definitive evidence of antiphospholipid syndrome and can be transition to DOAC with Eliquis at time of discharge.  Dosing recommended was the standard 10 mg twice daily dosing x 7 days followed by 5 mg dosing twice  daily thereafter.  They will follow her up in their clinic to review final test results.  Patient did have a bout of SVT probably exacerbated by the right lower extremity DVT and bilateral PEs.  Patient overall was asymptomatic with this finding and had stable vital signs.  Cardiology has added Toprol 12.5 mg to her medications now for control of this with good results.  Cardiology is continuing to follow the patient and will make final recommendations for medications based on how well she does as her activity increases.  Since we are giving some consideration to transferring the patient to home soon with follow-up with home health at home, I did order PT and OT consults for tomorrow to see how she does with the leg pain when she is more mobile.     2/10/2025.  I personally saw the patient for the first time during this hospitalization on this date in her room on 4 S[address] #412.  Patient was resting quietly in bed watching television at the time of my visit.  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  She is strongly considering taking the by out with the events of the last few days.  I wore full PPE as needed including an N95 mask as appropriate, goggles, white lab coat, and gloves when touching patient.  I performed thorough hand hygiene before and after the patient visit.  Patient seems to be in a good mood despite her new diagnoses.  She is willing to try the Lovenox IV but now may be able to transition instead to oral Eliquis therapy which would be much simpler for her to do at home.  Patient is continuing on her beta-blocker therapy with the Toprol without complications or problems.  It does seem to have reduced the frequency of her SVT.  Patient's appetite is fairly good and she is beginning to eat more.  She does seem to feel a bit better she says.  Her asthma has not been active at present time and she is breathing very  comfortably at present time.  Labs today show totally normal electrolytes.  Patient does have slight elevation of her blood sugar at 115.  She is scheduled for follow-up in endocrinology clinic same to more aggressively attacked her blood sugar issues.  Patient's white blood cell count was normal at 8.77 hemoglobin was 11.6 and hematocrit was 36.3.  Patient did have an echocardiogram done today which showed normal left ventricular systolic function with ejection fraction of 61.8%, left ventricular diastolic function also normal.  There was mild calcification of her aortic valve.  PT and OT will be seeing patient tomorrow to mobilize her and see how she is able to tolerate her pain.  Will also work with pharmacy on transition of patient to Eliquis therapy in AM.  Patient is appearing medically stable at present time.  We will continue to follow her closely.      2/11/2024.  Patient is seen again today in her room on 4 S. #412.  Patient was resting quietly in bed but has been up more today with therapy and did fairly well until she walked a bit further than usual and developed some pain in her leg at the end of the walking.  Generally her condition seems to be improved.  She has been on injections of Lovenox today but with permission from hematology we are going to be switching her over to an Eliquis pack tomorrow at the time of discharge.  I will full PPE for exam including an N95 mask as necessary, goggles as necessary, gloves when touching patient, and white lab coat.  I performed thorough hand hygiene before and after the patient visit.  Patient's labs today have been very stable.  Electrolytes were normal except for glucose that was slightly up at 123.  White blood cell count was 8.87 and hemoglobin was 12.0.  Platelet count was unremarkable.  Final echocardiogram report was back and shows left ventricular systolic function normal with ejection fraction of 61.8%.  Left ventricular diastolic function was normal.   There is mild calcification of her aortic valve.  Patient was seen by several specialist today.  Dr. Cotto from hematology did follow-up with her and is planning to schedule her to see Nathaniel Manzano in clinic on an outpatient basis.  He did leave orders for Eliquis dosing which will be started tomorrow at the time of her discharge.  At this point he signed off.  Physical therapy did meet with patient and found she was ambulatory and did not offer any additional input.  Cardiology did see the patient.  They do want her to have a Zio patch at the time of discharge to follow-up on the SVT and we will see her back in their clinic for follow-up on the patch.  They have decreased her beta-blocker back to 12.5 daily because it was held for 2 days when increased to a dose of 25 because of low blood pressures.  She is now anticoagulated with Lovenox and will be switching to Eliquis at the time of discharge.  Occupational Therapy did see the patient and worked with her today.  No additional needs for hospital were found by OT follow-up with home OT.  Patient has been on room air most of the day today.  Will do another walking oximetry prior to discharge tomorrow.  Patient was seen by pulmonary at the end of the day Dr. Ambrocio.  He also did sign off on case today but does feel patient should follow-up in pulmonary clinic for sleep evaluation and further follow-up on asthma and airway disease.  We anticipate discharge of patient to home tomorrow.  Will use home health short-term to check labs at home and monitor PT and OT progress since patient is having pain in her leg still with significant ambulation.     2/12/2024.  Patient is seen again today in her room on 4 S. #412 in anticipation and preparation for discharge to home.  Patient was awake and alert in room and actually standing up beside bed when I came in.  She has been watching television through the morning.  She was up rather late last night making decision about federal  employee buyout and has agreed to that process.  I will full PPE as indicated including an N95 mask when necessary, goggles, white lab coat, and gloves when touching patient.  I performed thorough hand hygiene before and after the patient visit.  Patient has received her last dose of Lovenox this a.m. and will be transitioning this evening to an Eliquis pack from the pharmacy which was brought up to her prior to her discharge.  Patient also has a prescription coming up for her Toprol which will be taking once a day to prevent or at least decrease the frequency of     Supraventricular tachycardia while on his Zio patch over the next 2 weeks.  Patient will be following up as requested by cardiology in their clinic for review of the Zio patch results. patient's appetite is gradually improving and she is beginning to eat a bit more.  Patient's asthma has been stable but she does have plans to follow-up in pulmonary clinic with Dr. Ambrocio who will be evaluating her pulmonary function test further as well as doing a CT scan to evaluate lung nodule seen during this hospitalization.  Case management has recommended home health by   [facility] and patient is agreeable to this.  They will be continuing OT and PT efforts to get her more mobile and nursing will be observing her and evaluating her in the posthospitalization phase for comfort level and stabilization of oxygen need. respiratory therapy did do a walking oximetry and patient did not qualify for oxygen with sats that stayed consistently in the 96 to 100% range on room air.  Cardiology did see patient prior to discharge today and has arranged for her follow-up in their clinic.  At this point it appears the patient has maximized benefit from hospitalization and is ready for discharge to home.  She does seem comfortable with her diagnosis and treatment plans.  Patient will follow-up with myself as her primary care provider within 2 weeks of this discharge via video  visit or in person.         Vital Signs     Temp:  [97.7 °F (36.5 °C)-98.6 °F (37 °C)] 97.7 °F (36.5 °C)  Heart Rate:  [60-81] 60  Resp:  [17-20] 18  BP: (109-133)/(65-71) 109/65     Physical Exam at Discharge      Constitutional:             Alert, cooperative,  mild distress due to occassional leg pain,with overuse AAOx3, resting comfortably   Head:                          Normocephalic, without obvious abnormality, atraumatic   Eyes:                          PERRLA, conjunctiva/corneas clear, no icterus, no conjunctival                                     pallor, EOM's intact, both eyes      ENT and Mouth:         Lips, tongue, gums normal; oral mucosa pink and moist   Neck:                          Supple, symmetrical, trachea midline, no JVD  Respiratory:                 Clear to auscultation bilaterally, respirations unlabored.  No major respiratory distress.  Occasional wheezing. No respiratory distress or needs for oxygen at present time  Cardiovascular:           Regular rate and rhythm, S1 and S2 normal, no murmur,                                       no  Rub or gallop.  Pulses normal.    Gastrointestinal:          BS present x 4 Soft, non-tender, bowel sounds active,                                       no masses, no hepatosplenomegaly                                                      :                             No hernia.  Normal exam for sex.         Musculoskeletal:        Extremities normal, atraumatic, no cyanosis or edema                                      No arthropathy.  No deformity.  Gait normal                                                 Skin:                           Skin is warm and dry,  no rashes, swelling or palpable lesions   Neurologic:                 CN -XII intact, motor strength grossly intact, sensation grossly intact to light touch, no focal reflex deficits noted    Psychiatric:                 Alert,oriented X3, no delusions, psychoses, depression or anxiety     Heme/Lymph/Imun:   No bruises, petechiae.  Lymph nodes normal in size/configuration       Pertinent Test Results:    Results from last 7 days   Lab Units 02/12/25  0327 02/11/25  0355 02/10/25  2050 02/10/25  0232   SODIUM mmol/L 137 138  --  137   POTASSIUM mmol/L 4.1 3.9 4.2 3.8   CHLORIDE mmol/L 103 104  --  105   CO2 mmol/L 23.7 25.1  --  23.7   BUN mg/dL 14 12  --  11   CREATININE mg/dL 0.71 0.76  --  0.72   GLUCOSE mg/dL 123* 124*  --  115*   CALCIUM mg/dL 9.1 9.0  --  8.9       Results from last 7 days   Lab Units 02/12/25  0327 02/11/25  0355 02/10/25  0232   WBC 10*3/mm3 8.87 9.28 9.77   HEMOGLOBIN g/dL 12.0 11.6* 11.6*   HEMATOCRIT % 35.1 36.4 36.3   PLATELETS 10*3/mm3 364 344 317             Attending Physician Final Assessment and Plan    1.  Acute right lower extremity DVT with bilateral pulmonary emboli.  Anticoagulation workup ongoing by hematology.  Pulmonary agrees with workup so far.  Based on preliminary results, hematology now believes that patient can be safely discharged to home on Eliquis therapy with a starter dose of 10 mg twice daily x 7 days and then 5 mg twice daily thereafter.  Further follow-up in hematology clinic as planned for the patient.  And also in pulmonary clinic.  Patient will be followed by [facility] health in the home environment.     2.  Reactive airway disease requiring as needed bronchodilators.  Pulmonary is recommended patient continue with the as needed bronchodilators when needed.  No other regular treatment suggested at present time.  To follow-up in pulmonary clinic as an outpatient.  Does need pulmonary function test and a follow-up CT scan regarding tiny lung nodule seen on scan here in the hospital.     3.  Type 2 diabetes mellitus.  Patient finds this to be relatively stable as long as she keeps her diet closely let regulated.  Patient does have a follow-up appointment with Dr. Mosher in the [facility] and plans to follow-up with him there in a couple  weeks.  Diabetic educator is working with the patient here at [facility].  She has used Dexcom short-term in the past and probably would benefit from this device again.  We will continue to follow-up with her on an outpatient basis for this.  Appointment already arranged with Dr. Mosher for follow-up.     4.  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 Rachel Adams in rheumatology here in [place] with Remicade at present time.  This autoimmune disorder is also affected patient's eye significantly.  She has followed up at [facility] for this in the recent past.  Continue follow-up with Dr. Adams as previously arranged.     5.  Hyperlipidemia.  Continue aggressive approach to diet regulation and control.  Patient is currently on Crestor 10 mg which will be continued at the time of discharge.     6.  Supraventricular tachycardia noted on monitor in hospital following pulmonary emboli.  Patient has been seen by her regular cardiologist who is Dr. Serra and Toprol 12.5 mg has been initiated with good control of her arrhythmia.  This medication will be continued at the time of discharge.  Patient to have Zio patch at discharge and follow-up with Dr. Serra as an outpatient.     7.  Obesity felt to be a complicating factor for the coagulopathy.  Patient strongly suggested to lose weight if possible.  She will work with dietitian through endocrinology when she gets to their clinics in a couple weeks.     8.  General anxiety disorder with situational depression and job stress.  Patient is giving consideration to correction at present time.         Condition on Discharge:    Stable    Discharge Disposition  Home-Health Care Svc  [facility] to follow    Transport Plan   family to transport patient home    Hospital Treatments discontinued at time of Discharge  IV, Telemetry, Arriola Catheter, Deep Lines and PICC LInes    Discharge Medications     Discharge Medications         New Medications        Instructions Start Date   acetaminophen 325 MG tablet  Commonly known as: TYLENOL   650 mg, Oral, Every 6 Hours PRN      Eliquis DVT/PE Starter Pack tablet therapy pack  Generic drug: Apixaban Starter Pack   5 mg, Oral, See Admin Instructions      metoprolol succinate XL 25 MG 24 hr tablet  Commonly known as: TOPROL-XL   12.5 mg, Oral, Nightly      OneTouch Delica Plus [id number] misc   Use to test blood glucose up to four times daily as needed. Formulary Compliance Approval. Diagnosis: Type 2 Diabetes - Not Insulin Dependent      OneTouch Verio Flex System w/Device kit   Use to test blood glucose up to four times daily as needed. Formulary Compliance Approval. Diagnosis: Type 2 Diabetes - Not Insulin Dependent      OneTouch Verio test strip  Generic drug: glucose blood   Use to test blood glucose up to four times daily as needed. Formulary Compliance Approval. Diagnosis: Type 2 Diabetes - Not Insulin Dependent      traMADol 50 MG tablet  Commonly known as: ULTRAM   Take 1-2 tablets by mouth Every 6 (Six) Hours As Needed for Moderate Pain for up to 6 days. Call office for further refills on med prior to weekend if needed.             Continue These Medications        Instructions Start Date   albuterol sulfate  (90 Base) MCG/ACT inhaler  Commonly known as: PROVENTIL HFA;VENTOLIN HFA;PROAIR HFA   INL 2 PFS PO Q 4 H PRN      Alphagan P 0.1 % solution ophthalmic solution  Generic drug: brimonidine   Administer 1 drop to both eyes 2 (Two) Times a Day.      atropine 1 % ophthalmic solution   Administer 1 drop to both eyes Every Morning.      benzonatate 200 MG capsule  Commonly known as: TESSALON   200 mg, Oral, 3 Times Daily PRN      Biotin 10 MG tablet   Daily      Calcium Carb-Cholecalciferol 600-200 MG-UNIT tablet   Take  by mouth.      dorzolamide-timolol 2-0.5 % ophthalmic solution  Commonly known as: COSOPT   1 drop, 2 Times Daily      doxycycline 50 MG tablet  Commonly known  as: ADOXA   Take 1 tablet by mouth 2 (Two) Times a Day.      folic acid 1 MG tablet  Commonly known as: FOLVITE   Take 1 tablet by mouth 2 (Two) Times a Day.      Lifitegrast 5 % ophthalmic solution  Commonly known as: XIIDRA   1 drop, Every 48 Hours      loratadine-pseudoephedrine 5-120 MG per 12 hr tablet  Commonly known as: CLARITIN-D 12-hour   Take  by mouth.      methotrexate 2.5 MG tablet   Take 8 tablets by mouth 1 (One) Time Per Week. wednesday      multivitamin tablet tablet  Commonly known as: THERAGRAN   Take  by mouth.      Prolensa 0.07 % solution  Generic drug: Bromfenac Sodium   1 drop, Both Eyes, Every Morning      REMICADE IV   Infuse  into a venous catheter.      Rocklatan 0.02-0.005 % solution  Generic drug: Netarsudil-Latanoprost   Administer 1 Application to both eyes Every Night.      rosuvastatin 10 MG tablet  Commonly known as: CRESTOR   10 mg, Daily      Xigduo XR  MG tablet  Generic drug: dapagliflozin-metformin HCl ER   Take 1 tablet by mouth Daily.               Home Medication List  Prior to Admission medications    Medication Sig Start Date End Date Taking? Authorizing Provider   acetaminophen (TYLENOL) 325 MG tablet Take 2 tablets by mouth Every 6 (Six) Hours As Needed for Mild Pain. 2/12/25  Yes Martin Vilchis MD   albuterol sulfate  (90 Base) MCG/ACT inhaler INL 2 PFS PO Q 4 H PRN 4/2/20  Yes Isael Skelton MD   Alphagan P 0.1 % solution ophthalmic solution Administer 1 drop to both eyes 2 (Two) Times a Day. 5/23/23  Yes Isael Skelton MD   atropine 1 % ophthalmic solution Administer 1 drop to both eyes Every Morning. 8/6/24  Yes Isael Skelton MD   benzonatate (TESSALON) 200 MG capsule Take 1 capsule by mouth 3 (Three) Times a Day As Needed for Cough for up to 30 doses. 2/12/25  Yes Martin Vilchis MD   Biotin 10 MG tablet Take  by mouth Daily.   Yes Isael Skelton MD   Calcium Carb-Cholecalciferol 600-200 MG-UNIT tablet Take  by mouth.  4/29/14  Yes Isael Skelton MD   dorzolamide-timolol (COSOPT) 2-0.5 % ophthalmic solution Administer 1 drop to both eyes 2 (Two) Times a Day.   Yes Isael Skelton MD   doxycycline (ADOXA) 50 MG tablet Take 1 tablet by mouth 2 (Two) Times a Day. 6/20/23  Yes Isael Skelton MD   folic acid (FOLVITE) 1 MG tablet Take 1 tablet by mouth 2 (Two) Times a Day. 5/23/23  Yes Isael Skelton MD   inFLIXimab (REMICADE IV) Infuse  into a venous catheter.   Yes Isael Skelton MD   Lifitegrast (XIIDRA) 5 % ophthalmic solution Administer 1 drop to both eyes Every Other Day.   Yes Isael Skelton MD   loratadine-pseudoephedrine (CLARITIN-D 12-hour) 5-120 MG per 12 hr tablet Take  by mouth. 4/29/14  Yes Isael Skelton MD   methotrexate 2.5 MG tablet Take 8 tablets by mouth 1 (One) Time Per Week. wednesday 7/24/23  Yes Isael Skelton MD   metoprolol succinate XL (TOPROL-XL) 25 MG 24 hr tablet Take 0.5 tablets by mouth Every Night. 2/12/25  Yes Martin Vilchis MD   Multiple Vitamin (MULTI VITAMIN DAILY PO) Take  by mouth. 4/29/14  Yes Isael Skelton MD   Prolensa 0.07 % solution Administer 1 drop to both eyes Every Morning. 7/15/21  Yes Isael Skelton MD   Rocklatan 0.02-0.005 % solution Administer 1 Application to both eyes Every Night. 7/24/23  Yes Isael Skelton MD   rosuvastatin (CRESTOR) 10 MG tablet Take 1 tablet by mouth Daily.   Yes ProviderIsale MD   traMADol (ULTRAM) 50 MG tablet Take 1-2 tablets by mouth Every 6 (Six) Hours As Needed for Moderate Pain for up to 6 days. Call office for further refills on med prior to weekend if needed. 2/12/25 2/18/25 Yes Martin Vilchis MD   Xigduo XR  MG tablet Take 1 tablet by mouth Daily. 10/28/24  Yes Isael Skelton MD   Apixaban Starter Pack tablet therapy pack Take two 5 mg tablets by mouth every 12 hours for 7 days. Followed by one 5 mg tablet every 12 hours. (Dispense starter pack if  available) 2/12/25   Martin Vilchis MD   glucose blood test strip Use to test blood glucose up to four times daily as needed. Formulary Compliance Approval. Diagnosis: Type 2 Diabetes - Not Insulin Dependent 2/12/25   Martin Vilchis MD   Blood Glucose Monitoring Suppl (OneTouch Verio Flex System) w/Device kit Use to test blood glucose up to four times daily as needed. Formulary Compliance Approval. Diagnosis: Type 2 Diabetes - Not Insulin Dependent 2/12/25   Martin Vilchis MD   Lancets misc Use to test blood glucose up to four times daily as needed. Formulary Compliance Approval. Diagnosis: Type 2 Diabetes - Not Insulin Dependent 2/12/25   Martin Vilchis MD       Discharge Diet   Diet Orders (active) (From admission, onward)      Consistent Carbohydrates            Activity at Discharge  Activity Instructions       Activity as Tolerated      Driving Restrictions      Type of Restriction:  Driving  Work       Driving Restrictions: No Driving    May Return to Work: After Next Appointment    With / Without Restrictions: Without Restrictions    Needs to avoid driving until leg pain resolves.  Video visit with  within 2 weeks to determine return to work dates    Gradually Increase Activity Until at Pre-Hospitalization Level              Follow-up Appointments  Future Appointments   Date Time Provider Department Center   3/5/2025 10:10 AM LAB CHAIR 5 [facility]   3/5/2025 10:40 AM Sally Manzano MD PhD [facility]   9/15/2025 10:15 AM Maribel Serra MD [facility]     Additional Instructions for the Follow-ups that You Need to Schedule       Ambulatory Referral to Home Health   As directed      Face to Face Visit Date: 2/12/2025   Follow-up provider for Plan of Care?: I will be treating the patient on an ongoing basis.  Please send me the Plan of Care for signature.   Follow-up provider: MARTIN VILCHIS [[id number]]   Reason/Clinical Findings: Walking limited due to  leg pain due to PE/DVT, needs asist of 1 to drive and go out of home   Describe mobility limitations that make leaving home difficult: As above   Nursing/Therapeutic Services Requested: Skilled Nursing (Needs labs once weekly x 2 CBC, CMP) Physical Therapy Occupational Therapy   Skilled nursing orders: Medication education (Monitor for blood loss bowels and elsewhere while starting Eliquis for PE) Pain management Cardiopulmonary assessments   PT orders: Therapeutic exercise Gait Training Transfer training Strengthening Home safety assessment   Weight Bearing Status: As Tolerated   Occupational orders: Activities of daily living Energy conservation Strengthening Fine motor Home safety assessment   Frequency: 1 Week 1        Call MD With Problems / Concerns   As directed      Instructions: Call Dr. Vilchis as needed [phone]    Order Comments: Instructions: Call Dr. Vilchis as needed [phone]         Discharge Follow-up with PCP   As directed       Currently Documented PCP:    Martin Vilchis MD    PCP Phone Number:    [phone]     Follow Up Details: To see Dr. vilchis by Televisit within 2 weeks hospital DC.  Call [phone] to schedule        Discharge Follow-up with Specified Provider: Cardiology Maryse; 1 Month   As directed      To: Cardiology Maryse   Follow Up: 1 Month   Follow Up Details: Review of Zio patch results        Discharge Follow-up with Specified Provider: Dr. Ambrocio  in Pulmonary; 1 Month   As directed      To: Dr. Ambrocio  in Pulmonary   Follow Up: 1 Month   Follow Up Details: Follow up on RAD and on sleep issued        Discharge Follow-up with Specified Provider: Hematology Dr. Manzano in 2 weeks as he scheduled with you; 2 Weeks   As directed      To: Hematology Dr. Manzano in 2 weeks as he scheduled with you   Follow Up: 2 Weeks   Follow Up Details: Follow up on anticoagulation work up                Therapy Orders  Physical therapy, Occupational Therapy,  with [facility]    Test  Results Pending at Discharge  Pending Labs       Order Current Status    Antiphosphatidic Acid In process    Antiphosphatidyl - Ethanolamine IgG / M / A In process          Copied text in this note has been reviewed by me and is accurate as of 02/12/2025  Much of this dictation is completed using dragon voice activated software.    Martin Vilchis MD  2/12/2025   1502 EST    Time: Discharge 50 min

## 2025-02-17 ENCOUNTER — READMISSION MANAGEMENT (OUTPATIENT)
Dept: CALL CENTER | Facility: HOSPITAL | Age: 65
End: 2025-02-17
Payer: COMMERCIAL

## 2025-02-17 NOTE — OUTREACH NOTE
Medical Week 1 Survey      Flowsheet Row Responses   [facility] patient discharged from? [place]   Does the patient have one of the following disease processes/diagnoses(primary or secondary)? Other   Week 1 attempt successful? Yes   Call start time 1545   Call end time 1557   Discharge diagnosis Bilateral pulmonary embolism   Meds reviewed with patient/caregiver? Yes   Is the patient having any side effects they believe may be caused by any medication additions or changes? No   Does the patient have all medications ordered at discharge? Yes   Is the patient taking all medications as directed (includes completed medication regime)? Yes   Does the patient have a primary care provider?  Yes   Does the patient have an appointment with their PCP within 7 days of discharge? Yes   Has the patient kept scheduled appointments due by today? N/A   What is the Home health agency?  [facility]    Has home health visited the patient within 72 hours of discharge? Yes   Comments Pt reports RLL pain improving, only noticable after ambulating. Patient denies RLL edema unless ambulating/standing. Pt denies SOA or chest pain.Sporadically monitoring /86 and glucose at home, per pt report. ZIO patch in place currently, the patient reports.   Did the patient receive a copy of their discharge instructions? Yes   Nursing interventions Reviewed instructions with patient   What is the patient's perception of their health status since discharge? Improving   Is the patient/caregiver able to teach back signs and symptoms related to disease process for when to call PCP? Yes   Is the patient/caregiver able to teach back signs and symptoms related to disease process for when to call 911? Yes   Is the patient/caregiver able to teach back the hierarchy of who to call/visit for symptoms/problems? PCP, Specialist, Home health nurse, Urgent Care, ED, 911 Yes   Week 1 call completed? Yes   Call end time 1557            Kimberly Olivo  Registered Nurse

## 2025-02-21 LAB
PE IGA SER-ACNC: 9.7 U/ML
PE IGG SER-ACNC: 0.7 U/ML
PE IGM SER-ACNC: 2.3 U/ML
PHOSPHATIDATE IGA SER-ACNC: 6.7 U/ML
PHOSPHATIDATE IGG SER-ACNC: 6.8 U/ML
PHOSPHATIDATE IGM SER-ACNC: 9.9 U/ML

## 2025-02-24 ENCOUNTER — TELEPHONE (OUTPATIENT)
Dept: ONCOLOGY | Facility: CLINIC | Age: 65
End: 2025-02-24
Payer: COMMERCIAL

## 2025-02-24 NOTE — TELEPHONE ENCOUNTER
Caller: Lala Dejesus    Relationship to patient: Self    Best call back number: [phone]    Chief complaint: HAS ANOTHER APPOINTMENT AT 9AM WITH  ENDOCRINOLOGIST AT [facility] ON 03/05 LAST ABOUT A HOUR    WOULD NOT MAKE IT  FOR LAB AT 10:10AM BUT WOULD MAKE IT FOR FOLLOW UP  10:40AM FOLLOW UP         Type of visit: LAB AND FOLLOW UP 1    Requested date: 03/05 WANTED TO SEE IF CAN MOVE APPT TIME DOWN FOR A LATER TIME TO BE ABLE TO MAKE APPT  SAME DAY 03/05  CLOSER TO 11AM     OR IF CAN ASKING IF CAN DO THE LAB ONLY  PART OF APPT THE DAY BEFORE ON 03/04  IN THE AFTER NOON     If rescheduling, when is the original appointment: 03/05    Additional notes:ALSO WANTED TO NOTE FOR DR GARCIA LIVER ENZYMES HAVE RISEN IS ON CHART TO

## 2025-02-26 ENCOUNTER — READMISSION MANAGEMENT (OUTPATIENT)
Dept: CALL CENTER | Facility: HOSPITAL | Age: 65
End: 2025-02-26
Payer: COMMERCIAL

## 2025-02-26 NOTE — OUTREACH NOTE
Medical Week 2 Survey      Flowsheet Row Responses   [facility] patient discharged from? [place]   Does the patient have one of the following disease processes/diagnoses(primary or secondary)? Other   Week 2 attempt successful? Yes   Call start time 1406   Discharge diagnosis Bilateral pulmonary embolism   Call end time 1413   Meds reviewed with patient/caregiver? Yes   Is the patient having any side effects they believe may be caused by any medication additions or changes? No   Does the patient have all medications ordered at discharge? Yes   Is the patient taking all medications as directed (includes completed medication regime)? Yes   Does the patient have a primary care provider?  Yes   Does the patient have an appointment with their PCP within 7 days of discharge? Yes   Has the patient kept scheduled appointments due by today? Yes   What is the Home health agency?  [facility]    Has home health visited the patient within 72 hours of discharge? Yes   Psychosocial issues? No   Did the patient receive a copy of their discharge instructions? Yes   Nursing interventions Reviewed instructions with patient   What is the patient's perception of their health status since discharge? Improving   Week 2 Call Completed? Yes   Is the patient interested in additional calls from an ambulatory ? No   Would this patient benefit from a Referral to [facility]? No   Call end time 1413            Margaux GARCIA - Registered Nurse   ,

## 2025-03-05 ENCOUNTER — LAB (OUTPATIENT)
Dept: LAB | Facility: HOSPITAL | Age: 65
End: 2025-03-05
Payer: COMMERCIAL

## 2025-03-05 ENCOUNTER — OFFICE VISIT (OUTPATIENT)
Dept: ONCOLOGY | Facility: CLINIC | Age: 65
End: 2025-03-05
Payer: COMMERCIAL

## 2025-03-05 VITALS
RESPIRATION RATE: 14 BRPM | DIASTOLIC BLOOD PRESSURE: 84 MMHG | OXYGEN SATURATION: 97 % | BODY MASS INDEX: 35.59 KG/M2 | HEIGHT: 64 IN | WEIGHT: 208.5 LBS | SYSTOLIC BLOOD PRESSURE: 142 MMHG | TEMPERATURE: 97.7 F | HEART RATE: 70 BPM

## 2025-03-05 DIAGNOSIS — I26.99 BILATERAL PULMONARY EMBOLISM: Primary | ICD-10-CM

## 2025-03-05 DIAGNOSIS — I26.99 BILATERAL PULMONARY EMBOLISM: ICD-10-CM

## 2025-03-05 DIAGNOSIS — I82.411 ACUTE DEEP VEIN THROMBOSIS (DVT) OF FEMORAL VEIN OF RIGHT LOWER EXTREMITY: ICD-10-CM

## 2025-03-05 LAB
ALBUMIN SERPL-MCNC: 4 G/DL (ref 3.5–5.2)
ALBUMIN/GLOB SERPL: 1 G/DL
ALP SERPL-CCNC: 130 U/L (ref 39–117)
ALT SERPL W P-5'-P-CCNC: 24 U/L (ref 1–33)
ANION GAP SERPL CALCULATED.3IONS-SCNC: 8.6 MMOL/L (ref 5–15)
AST SERPL-CCNC: 17 U/L (ref 1–32)
BASOPHILS # BLD AUTO: 0.01 10*3/MM3 (ref 0–0.2)
BASOPHILS NFR BLD AUTO: 0.2 % (ref 0–1.5)
BILIRUB SERPL-MCNC: 0.3 MG/DL (ref 0–1.2)
BUN SERPL-MCNC: 8 MG/DL (ref 8–23)
BUN/CREAT SERPL: 13.1 (ref 7–25)
CALCIUM SPEC-SCNC: 9.4 MG/DL (ref 8.6–10.5)
CHLORIDE SERPL-SCNC: 101 MMOL/L (ref 98–107)
CO2 SERPL-SCNC: 28.4 MMOL/L (ref 22–29)
CREAT SERPL-MCNC: 0.61 MG/DL (ref 0.57–1)
DEPRECATED RDW RBC AUTO: 44.8 FL (ref 37–54)
EGFRCR SERPLBLD CKD-EPI 2021: 101.2 ML/MIN/1.73
EOSINOPHIL # BLD AUTO: 0.09 10*3/MM3 (ref 0–0.4)
EOSINOPHIL NFR BLD AUTO: 1.5 % (ref 0.3–6.2)
ERYTHROCYTE [DISTWIDTH] IN BLOOD BY AUTOMATED COUNT: 13.3 % (ref 12.3–15.4)
GLOBULIN UR ELPH-MCNC: 3.9 GM/DL
GLUCOSE SERPL-MCNC: 279 MG/DL (ref 65–99)
HCT VFR BLD AUTO: 40.6 % (ref 34–46.6)
HGB BLD-MCNC: 12.9 G/DL (ref 12–15.9)
IMM GRANULOCYTES # BLD AUTO: 0.02 10*3/MM3 (ref 0–0.05)
IMM GRANULOCYTES NFR BLD AUTO: 0.3 % (ref 0–0.5)
LYMPHOCYTES # BLD AUTO: 2.39 10*3/MM3 (ref 0.7–3.1)
LYMPHOCYTES NFR BLD AUTO: 38.7 % (ref 19.6–45.3)
MCH RBC QN AUTO: 29.5 PG (ref 26.6–33)
MCHC RBC AUTO-ENTMCNC: 31.8 G/DL (ref 31.5–35.7)
MCV RBC AUTO: 92.9 FL (ref 79–97)
MONOCYTES # BLD AUTO: 0.5 10*3/MM3 (ref 0.1–0.9)
MONOCYTES NFR BLD AUTO: 8.1 % (ref 5–12)
NEUTROPHILS NFR BLD AUTO: 3.16 10*3/MM3 (ref 1.7–7)
NEUTROPHILS NFR BLD AUTO: 51.2 % (ref 42.7–76)
NRBC BLD AUTO-RTO: 0 /100 WBC (ref 0–0.2)
PLATELET # BLD AUTO: 240 10*3/MM3 (ref 140–450)
PMV BLD AUTO: 10.2 FL (ref 6–12)
POTASSIUM SERPL-SCNC: 4.3 MMOL/L (ref 3.5–5.2)
PROT SERPL-MCNC: 7.9 G/DL (ref 6–8.5)
RBC # BLD AUTO: 4.37 10*6/MM3 (ref 3.77–5.28)
SODIUM SERPL-SCNC: 138 MMOL/L (ref 136–145)
WBC NRBC COR # BLD AUTO: 6.17 10*3/MM3 (ref 3.4–10.8)

## 2025-03-05 PROCEDURE — 85025 COMPLETE CBC W/AUTO DIFF WBC: CPT

## 2025-03-05 PROCEDURE — 80053 COMPREHEN METABOLIC PANEL: CPT

## 2025-03-05 PROCEDURE — 36415 COLL VENOUS BLD VENIPUNCTURE: CPT

## 2025-03-05 RX ORDER — DAPAGLIFLOZIN 10 MG/1
10 TABLET, FILM COATED ORAL DAILY
COMMUNITY
Start: 2025-03-05 | End: 2025-04-04

## 2025-03-05 RX ORDER — TIRZEPATIDE 2.5 MG/.5ML
2.5 INJECTION, SOLUTION SUBCUTANEOUS
COMMUNITY
Start: 2025-03-05 | End: 2025-04-02

## 2025-03-17 NOTE — CASE MANAGEMENT/SOCIAL WORK
Case Management Discharge Note      Final Note: Home with [facility]. No additional CCP needs.         Selected Continued Care - Discharged on 2/12/2025 Admission date: 2/5/2025 - Discharge disposition: Home-Health Care Svc      Destination    No services have been selected for the patient.                Durable Medical Equipment    No services have been selected for the patient.                Dialysis/Infusion    No services have been selected for the patient.                Home Medical Care    No services have been selected for the patient.                Therapy    No services have been selected for the patient.                Community Resources    No services have been selected for the patient.                Community & DME    No services have been selected for the patient.                         Final Discharge Disposition Code: 06 - home with home health care   no

## 2025-03-19 ENCOUNTER — OFFICE VISIT (OUTPATIENT)
Dept: CARDIOLOGY | Facility: CLINIC | Age: 65
End: 2025-03-19
Payer: COMMERCIAL

## 2025-03-19 VITALS
WEIGHT: 205 LBS | HEIGHT: 64 IN | BODY MASS INDEX: 35 KG/M2 | DIASTOLIC BLOOD PRESSURE: 78 MMHG | SYSTOLIC BLOOD PRESSURE: 121 MMHG | HEART RATE: 71 BPM

## 2025-03-19 DIAGNOSIS — R07.2 PRECORDIAL PAIN: ICD-10-CM

## 2025-03-19 DIAGNOSIS — I26.99 PULMONARY EMBOLISM, UNSPECIFIED CHRONICITY, UNSPECIFIED PULMONARY EMBOLISM TYPE, UNSPECIFIED WHETHER ACUTE COR PULMONALE PRESENT: ICD-10-CM

## 2025-03-19 DIAGNOSIS — E78.00 PURE HYPERCHOLESTEROLEMIA: ICD-10-CM

## 2025-03-19 DIAGNOSIS — I44.7 LBBB (LEFT BUNDLE BRANCH BLOCK): ICD-10-CM

## 2025-03-19 DIAGNOSIS — R94.31 ABNORMAL EKG: Primary | ICD-10-CM

## 2025-03-19 LAB
CV ZIO BASELINE AVG BPM: 83
CV ZIO BASELINE BPM HIGH: 156
CV ZIO BASELINE BPM LOW: 56

## 2025-03-19 PROCEDURE — 99213 OFFICE O/P EST LOW 20 MIN: CPT | Performed by: INTERNAL MEDICINE
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
## 2025-03-26 ENCOUNTER — TELEPHONE (OUTPATIENT)
Dept: CARDIOLOGY | Facility: CLINIC | Age: 65
End: 2025-03-26
Payer: COMMERCIAL

## 2025-03-26 NOTE — TELEPHONE ENCOUNTER
----- Message from Marlene Carpenter sent at 3/26/2025  3:08 PM EDT -----  Is let her know her Holter monitor showed NSR, OCCASIONAL PACS, PVCS, and  RARE NSSVTS.  Which is essentially a normal study.Thank you, MARNIE Queen    ATTEMPTED TO CALL -CHRISTOPHER HENNESSY

## 2025-04-04 ENCOUNTER — HOSPITAL ENCOUNTER (OUTPATIENT)
Dept: CARDIOLOGY | Facility: HOSPITAL | Age: 65
Discharge: HOME | End: 2025-04-04
Admitting: INTERNAL MEDICINE
Payer: COMMERCIAL

## 2025-04-04 DIAGNOSIS — I82.411 ACUTE DEEP VEIN THROMBOSIS (DVT) OF FEMORAL VEIN OF RIGHT LOWER EXTREMITY: ICD-10-CM

## 2025-04-04 DIAGNOSIS — I26.99 BILATERAL PULMONARY EMBOLISM: ICD-10-CM

## 2025-04-04 LAB
BH CV LOW VAS RIGHT DISTAL FEMORAL SPONT: 1
BH CV LOW VAS RIGHT GASTRONEMIUS VESSEL: 1
BH CV LOW VAS RIGHT MID FEMORAL SPONT: 1
BH CV LOW VAS RIGHT PERONEAL VESSEL: 1
BH CV LOW VAS RIGHT POPLITEAL SPONT: 1
BH CV LOWER VASCULAR RIGHT COMMON FEMORAL AUGMENT: NORMAL
BH CV LOWER VASCULAR RIGHT COMMON FEMORAL COMPETENT: NORMAL
BH CV LOWER VASCULAR RIGHT COMMON FEMORAL COMPRESS: NORMAL
BH CV LOWER VASCULAR RIGHT COMMON FEMORAL PHASIC: NORMAL
BH CV LOWER VASCULAR RIGHT COMMON FEMORAL SPONT: NORMAL
BH CV LOWER VASCULAR RIGHT DISTAL FEMORAL AUGMENT: NORMAL
BH CV LOWER VASCULAR RIGHT DISTAL FEMORAL COMPRESS: NORMAL
BH CV LOWER VASCULAR RIGHT DISTAL FEMORAL PHASIC: NORMAL
BH CV LOWER VASCULAR RIGHT DISTAL FEMORAL SPONT: NORMAL
BH CV LOWER VASCULAR RIGHT DISTAL FEMORAL THROMBUS: NORMAL
BH CV LOWER VASCULAR RIGHT GASTRONEMIUS COMPRESS: NORMAL
BH CV LOWER VASCULAR RIGHT GASTRONEMIUS THROMBUS: NORMAL
BH CV LOWER VASCULAR RIGHT GREATER SAPH AK COMPRESS: NORMAL
BH CV LOWER VASCULAR RIGHT GREATER SAPH BK COMPRESS: NORMAL
BH CV LOWER VASCULAR RIGHT LESSER SAPH COMPRESS: NORMAL
BH CV LOWER VASCULAR RIGHT MID FEMORAL AUGMENT: NORMAL
BH CV LOWER VASCULAR RIGHT MID FEMORAL COMPETENT: NORMAL
BH CV LOWER VASCULAR RIGHT MID FEMORAL COMPRESS: NORMAL
BH CV LOWER VASCULAR RIGHT MID FEMORAL PHASIC: NORMAL
BH CV LOWER VASCULAR RIGHT MID FEMORAL SPONT: NORMAL
BH CV LOWER VASCULAR RIGHT MID FEMORAL THROMBUS: NORMAL
BH CV LOWER VASCULAR RIGHT PERONEAL COMPRESS: NORMAL
BH CV LOWER VASCULAR RIGHT PERONEAL THROMBUS: NORMAL
BH CV LOWER VASCULAR RIGHT POPLITEAL COMPRESS: NORMAL
BH CV LOWER VASCULAR RIGHT POPLITEAL PHASIC: NORMAL
BH CV LOWER VASCULAR RIGHT POPLITEAL SPONT: NORMAL
BH CV LOWER VASCULAR RIGHT POPLITEAL THROMBUS: NORMAL
BH CV LOWER VASCULAR RIGHT POSTERIOR TIBIAL COMPRESS: NORMAL
BH CV LOWER VASCULAR RIGHT PROFUNDA FEMORAL COMPRESS: NORMAL
BH CV LOWER VASCULAR RIGHT PROXIMAL FEMORAL COMPRESS: NORMAL
BH CV LOWER VASCULAR RIGHT SAPHENOFEMORAL JUNCTION COMPRESS: NORMAL

## 2025-04-04 PROCEDURE — 93971 EXTREMITY STUDY: CPT

## 2025-06-24 ENCOUNTER — OFFICE VISIT (OUTPATIENT)
Dept: ONCOLOGY | Facility: CLINIC | Age: 65
End: 2025-06-24
Payer: COMMERCIAL

## 2025-06-24 ENCOUNTER — LAB (OUTPATIENT)
Dept: LAB | Facility: HOSPITAL | Age: 65
End: 2025-06-24
Payer: COMMERCIAL

## 2025-06-24 VITALS
WEIGHT: 198 LBS | RESPIRATION RATE: 16 BRPM | TEMPERATURE: 97.8 F | SYSTOLIC BLOOD PRESSURE: 126 MMHG | HEIGHT: 64 IN | DIASTOLIC BLOOD PRESSURE: 81 MMHG | BODY MASS INDEX: 33.8 KG/M2 | HEART RATE: 71 BPM | OXYGEN SATURATION: 99 %

## 2025-06-24 DIAGNOSIS — I82.411 ACUTE DEEP VEIN THROMBOSIS (DVT) OF FEMORAL VEIN OF RIGHT LOWER EXTREMITY: Primary | ICD-10-CM

## 2025-06-24 DIAGNOSIS — I26.99 BILATERAL PULMONARY EMBOLISM: ICD-10-CM

## 2025-06-24 DIAGNOSIS — I82.411 ACUTE DEEP VEIN THROMBOSIS (DVT) OF FEMORAL VEIN OF RIGHT LOWER EXTREMITY: ICD-10-CM

## 2025-06-24 DIAGNOSIS — Z79.01 ANTICOAGULATION ADEQUATE WITH ANTICOAGULANT THERAPY: ICD-10-CM

## 2025-06-24 LAB
BASOPHILS # BLD AUTO: 0.03 10*3/MM3 (ref 0–0.2)
BASOPHILS NFR BLD AUTO: 0.4 % (ref 0–1.5)
DEPRECATED RDW RBC AUTO: 43.6 FL (ref 37–54)
EOSINOPHIL # BLD AUTO: 0.1 10*3/MM3 (ref 0–0.4)
EOSINOPHIL NFR BLD AUTO: 1.3 % (ref 0.3–6.2)
ERYTHROCYTE [DISTWIDTH] IN BLOOD BY AUTOMATED COUNT: 13.2 % (ref 12.3–15.4)
HCT VFR BLD AUTO: 39.7 % (ref 34–46.6)
HGB BLD-MCNC: 12.8 G/DL (ref 12–15.9)
IMM GRANULOCYTES # BLD AUTO: 0.04 10*3/MM3 (ref 0–0.05)
IMM GRANULOCYTES NFR BLD AUTO: 0.5 % (ref 0–0.5)
LYMPHOCYTES # BLD AUTO: 2.96 10*3/MM3 (ref 0.7–3.1)
LYMPHOCYTES NFR BLD AUTO: 37.3 % (ref 19.6–45.3)
MCH RBC QN AUTO: 29.4 PG (ref 26.6–33)
MCHC RBC AUTO-ENTMCNC: 32.2 G/DL (ref 31.5–35.7)
MCV RBC AUTO: 91.1 FL (ref 79–97)
MONOCYTES # BLD AUTO: 0.79 10*3/MM3 (ref 0.1–0.9)
MONOCYTES NFR BLD AUTO: 9.9 % (ref 5–12)
NEUTROPHILS NFR BLD AUTO: 4.02 10*3/MM3 (ref 1.7–7)
NEUTROPHILS NFR BLD AUTO: 50.6 % (ref 42.7–76)
NRBC BLD AUTO-RTO: 0.3 /100 WBC (ref 0–0.2)
PLATELET # BLD AUTO: 238 10*3/MM3 (ref 140–450)
PMV BLD AUTO: 10.3 FL (ref 6–12)
RBC # BLD AUTO: 4.36 10*6/MM3 (ref 3.77–5.28)
WBC NRBC COR # BLD AUTO: 7.94 10*3/MM3 (ref 3.4–10.8)

## 2025-06-24 PROCEDURE — 99214 OFFICE O/P EST MOD 30 MIN: CPT | Performed by: INTERNAL MEDICINE

## 2025-06-24 PROCEDURE — 36415 COLL VENOUS BLD VENIPUNCTURE: CPT

## 2025-06-24 PROCEDURE — 85025 COMPLETE CBC W/AUTO DIFF WBC: CPT

## 2025-06-24 RX ORDER — TIRZEPATIDE 5 MG/.5ML
5 INJECTION, SOLUTION SUBCUTANEOUS WEEKLY
COMMUNITY
Start: 2025-06-16 | End: 2025-07-14

## 2025-06-24 RX ORDER — DAPAGLIFLOZIN 10 MG/1
1 TABLET, FILM COATED ORAL DAILY
COMMUNITY
Start: 2025-06-01

## 2025-06-24 RX ORDER — DIFLUPREDNATE OPHTHALMIC 0.5 MG/ML
1 EMULSION OPHTHALMIC 4 TIMES DAILY
COMMUNITY
Start: 2025-06-05

## 2025-06-24 RX ORDER — CETIRIZINE HYDROCHLORIDE 10 MG/1
10 TABLET ORAL AS NEEDED
COMMUNITY
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
## 2025-07-07 PROBLEM — Z79.01 ANTICOAGULATION ADEQUATE WITH ANTICOAGULANT THERAPY: Status: ACTIVE | Noted: 2025-07-07
